# Patient Record
Sex: FEMALE | Race: BLACK OR AFRICAN AMERICAN | Employment: FULL TIME | URBAN - METROPOLITAN AREA
[De-identification: names, ages, dates, MRNs, and addresses within clinical notes are randomized per-mention and may not be internally consistent; named-entity substitution may affect disease eponyms.]

---

## 2017-03-20 ENCOUNTER — OFFICE VISIT (OUTPATIENT)
Dept: INTERNAL MEDICINE CLINIC | Facility: CLINIC | Age: 37
End: 2017-03-20

## 2017-03-20 ENCOUNTER — LAB ENCOUNTER (OUTPATIENT)
Dept: LAB | Age: 37
End: 2017-03-20
Attending: INTERNAL MEDICINE
Payer: COMMERCIAL

## 2017-03-20 VITALS
HEIGHT: 63 IN | HEART RATE: 92 BPM | SYSTOLIC BLOOD PRESSURE: 141 MMHG | BODY MASS INDEX: 40.75 KG/M2 | WEIGHT: 230 LBS | DIASTOLIC BLOOD PRESSURE: 85 MMHG

## 2017-03-20 DIAGNOSIS — Z00.00 ROUTINE GENERAL MEDICAL EXAMINATION AT A HEALTH CARE FACILITY: Primary | ICD-10-CM

## 2017-03-20 DIAGNOSIS — Z00.00 ROUTINE GENERAL MEDICAL EXAMINATION AT A HEALTH CARE FACILITY: ICD-10-CM

## 2017-03-20 DIAGNOSIS — K21.9 GASTROESOPHAGEAL REFLUX DISEASE WITHOUT ESOPHAGITIS: ICD-10-CM

## 2017-03-20 DIAGNOSIS — I10 ESSENTIAL HYPERTENSION: ICD-10-CM

## 2017-03-20 DIAGNOSIS — E66.09 NON MORBID OBESITY DUE TO EXCESS CALORIES: ICD-10-CM

## 2017-03-20 DIAGNOSIS — K64.9 HEMORRHOIDS, UNSPECIFIED HEMORRHOID TYPE: ICD-10-CM

## 2017-03-20 LAB
ALBUMIN SERPL BCP-MCNC: 3.7 G/DL (ref 3.5–4.8)
ALBUMIN/GLOB SERPL: 1.1 {RATIO} (ref 1–2)
ALP SERPL-CCNC: 55 U/L (ref 32–100)
ALT SERPL-CCNC: 19 U/L (ref 14–54)
ANION GAP SERPL CALC-SCNC: 9 MMOL/L (ref 0–18)
AST SERPL-CCNC: 19 U/L (ref 15–41)
BASOPHILS # BLD: 0.1 K/UL (ref 0–0.2)
BASOPHILS NFR BLD: 1 %
BILIRUB SERPL-MCNC: 0.6 MG/DL (ref 0.3–1.2)
BILIRUB UR QL: NEGATIVE
BUN SERPL-MCNC: 11 MG/DL (ref 8–20)
BUN/CREAT SERPL: 12.6 (ref 10–20)
CALCIUM SERPL-MCNC: 9.5 MG/DL (ref 8.5–10.5)
CHLORIDE SERPL-SCNC: 102 MMOL/L (ref 95–110)
CHOLEST SERPL-MCNC: 224 MG/DL (ref 110–200)
CLARITY UR: CLEAR
CO2 SERPL-SCNC: 29 MMOL/L (ref 22–32)
COLOR UR: YELLOW
CREAT SERPL-MCNC: 0.87 MG/DL (ref 0.5–1.5)
EOSINOPHIL # BLD: 0.1 K/UL (ref 0–0.7)
EOSINOPHIL NFR BLD: 1 %
ERYTHROCYTE [DISTWIDTH] IN BLOOD BY AUTOMATED COUNT: 14.4 % (ref 11–15)
GLOBULIN PLAS-MCNC: 3.5 G/DL (ref 2.5–3.7)
GLUCOSE SERPL-MCNC: 90 MG/DL (ref 70–99)
GLUCOSE UR-MCNC: NEGATIVE MG/DL
HCT VFR BLD AUTO: 36.8 % (ref 35–48)
HDLC SERPL-MCNC: 54 MG/DL
HGB BLD-MCNC: 11.7 G/DL (ref 12–16)
HGB UR QL STRIP.AUTO: NEGATIVE
KETONES UR-MCNC: NEGATIVE MG/DL
LDLC SERPL CALC-MCNC: 154 MG/DL (ref 0–99)
LEUKOCYTE ESTERASE UR QL STRIP.AUTO: NEGATIVE
LYMPHOCYTES # BLD: 1.9 K/UL (ref 1–4)
LYMPHOCYTES NFR BLD: 30 %
MCH RBC QN AUTO: 23.1 PG (ref 27–32)
MCHC RBC AUTO-ENTMCNC: 31.6 G/DL (ref 32–37)
MCV RBC AUTO: 73.1 FL (ref 80–100)
MONOCYTES # BLD: 0.5 K/UL (ref 0–1)
MONOCYTES NFR BLD: 7 %
NEUTROPHILS # BLD AUTO: 3.9 K/UL (ref 1.8–7.7)
NEUTROPHILS NFR BLD: 61 %
NITRITE UR QL STRIP.AUTO: NEGATIVE
NONHDLC SERPL-MCNC: 170 MG/DL
OSMOLALITY UR CALC.SUM OF ELEC: 289 MOSM/KG (ref 275–295)
PH UR: 6 [PH] (ref 5–8)
PLATELET # BLD AUTO: 241 K/UL (ref 140–400)
PMV BLD AUTO: 11.7 FL (ref 7.4–10.3)
POTASSIUM SERPL-SCNC: 2.9 MMOL/L (ref 3.3–5.1)
PROT SERPL-MCNC: 7.2 G/DL (ref 5.9–8.4)
PROT UR-MCNC: 30 MG/DL
RBC # BLD AUTO: 5.04 M/UL (ref 3.7–5.4)
RBC #/AREA URNS AUTO: <1 /HPF
SODIUM SERPL-SCNC: 140 MMOL/L (ref 136–144)
SP GR UR STRIP: 1.03 (ref 1–1.03)
TRIGL SERPL-MCNC: 81 MG/DL (ref 1–149)
TSH SERPL-ACNC: 1.75 UIU/ML (ref 0.34–5.6)
UROBILINOGEN UR STRIP-ACNC: 2
VIT C UR-MCNC: 40 MG/DL
WBC # BLD AUTO: 6.4 K/UL (ref 4–11)
WBC #/AREA URNS AUTO: 1 /HPF

## 2017-03-20 PROCEDURE — 80061 LIPID PANEL: CPT

## 2017-03-20 PROCEDURE — 99395 PREV VISIT EST AGE 18-39: CPT | Performed by: INTERNAL MEDICINE

## 2017-03-20 PROCEDURE — 85025 COMPLETE CBC W/AUTO DIFF WBC: CPT

## 2017-03-20 PROCEDURE — 84443 ASSAY THYROID STIM HORMONE: CPT

## 2017-03-20 PROCEDURE — 82306 VITAMIN D 25 HYDROXY: CPT

## 2017-03-20 PROCEDURE — 81001 URINALYSIS AUTO W/SCOPE: CPT

## 2017-03-20 PROCEDURE — 96372 THER/PROPH/DIAG INJ SC/IM: CPT | Performed by: INTERNAL MEDICINE

## 2017-03-20 PROCEDURE — 80053 COMPREHEN METABOLIC PANEL: CPT

## 2017-03-20 PROCEDURE — 36415 COLL VENOUS BLD VENIPUNCTURE: CPT

## 2017-03-20 RX ORDER — HYDROCHLOROTHIAZIDE 25 MG/1
25 TABLET ORAL AS NEEDED
Qty: 90 TABLET | Refills: 3 | Status: SHIPPED | OUTPATIENT
Start: 2017-03-20 | End: 2017-06-29

## 2017-03-20 RX ORDER — MEDROXYPROGESTERONE ACETATE 150 MG/ML
150 INJECTION, SUSPENSION INTRAMUSCULAR ONCE
Status: COMPLETED | OUTPATIENT
Start: 2017-03-20 | End: 2017-03-20

## 2017-03-20 RX ORDER — ONDANSETRON HYDROCHLORIDE 8 MG/1
8 TABLET, FILM COATED ORAL EVERY 8 HOURS PRN
Qty: 10 TABLET | Refills: 0 | Status: SHIPPED | OUTPATIENT
Start: 2017-03-20 | End: 2019-02-07

## 2017-03-20 RX ADMIN — MEDROXYPROGESTERONE ACETATE 150 MG: 150 INJECTION, SUSPENSION INTRAMUSCULAR at 11:45:00

## 2017-03-20 NOTE — PROGRESS NOTES
HPI:    Patient ID: Fidel Vanessa is a 43year old female. HPI this 41-year-old -American female is feeling well.   She is under stress with a very demanding job  Hypertension:Patient has been following low salt diet and has been taking anti-hype atraumatic. Right Ear: External ear normal.   Left Ear: External ear normal.   Nose: Nose normal.   Mouth/Throat: Oropharynx is clear and moist.   Eyes: Conjunctivae and EOM are normal. Pupils are equal, round, and reactive to light.    Neck: Normal range [E]; Future  - Vitamin D, 25-Hydroxy [E]; Future    2. Essential hypertension  Patient has been following low salt diet and has been taking anti-hypertensive prescriptions as prescribed. lood pressure has been checked and is under good control at home with

## 2017-03-22 LAB — 25(OH)D3 SERPL-MCNC: 30 NG/ML

## 2017-03-23 NOTE — PROGRESS NOTES
Quick Note:    Lab: Potassium is low Please ingest high potassium food and consider taking pediatric feosol 1 daily . Lipids remain elevated.  Low-fat die And follow-up with physician  ______

## 2017-04-04 ENCOUNTER — TELEPHONE (OUTPATIENT)
Dept: INTERNAL MEDICINE CLINIC | Facility: CLINIC | Age: 37
End: 2017-04-04

## 2017-04-05 NOTE — TELEPHONE ENCOUNTER
Spoke with patient (identified name and ), results reviewed and agrees with plan.   Diet for chol/high K+ and results mailed

## 2017-04-05 NOTE — TELEPHONE ENCOUNTER
----- Message from Denisse Laws MD sent at 3/23/2017 12:40 PM CDT -----  Lab: Potassium is low Please ingest high potassium food and consider taking pediatric feosol  1 daily  . Lipids remain elevated.   Low-fat die  And follow-up with physician

## 2017-06-01 ENCOUNTER — TELEPHONE (OUTPATIENT)
Dept: INTERNAL MEDICINE CLINIC | Facility: CLINIC | Age: 37
End: 2017-06-01

## 2017-06-01 DIAGNOSIS — I10 ESSENTIAL HYPERTENSION: Primary | ICD-10-CM

## 2017-06-01 NOTE — TELEPHONE ENCOUNTER
Pt is a former pt of Dr. Juan Jose Garrido and is requesting a Depo Provera injection the week of 06/12. Pt last injection dated 03/20/17 and next injection is due 06/05-06/19/19. Pt has an appt with Dr. Jeannie George on 06/19/17.  Is it ok to schedule nurse visit for

## 2017-06-01 NOTE — TELEPHONE ENCOUNTER
Pt calling and is in need of a depo shot. No standing orders in system.  Pt would like to come in during week of June 12th  Former pt of Dr. Radha Brink

## 2017-06-02 NOTE — TELEPHONE ENCOUNTER
Pt was contacted and message was relayed. Appointment was scheduled for Depo injection for 06/12/17 and informed that the order for the blood is in the system and that she will need to be fasting. All instruction was voiced and understood.

## 2017-06-02 NOTE — TELEPHONE ENCOUNTER
Approved - if  Pregnancy  Test  Neg .   Please advice pt  To have labs  Dine  Before  Visit -fasting

## 2017-06-12 ENCOUNTER — NURSE ONLY (OUTPATIENT)
Dept: INTERNAL MEDICINE CLINIC | Facility: CLINIC | Age: 37
End: 2017-06-12

## 2017-06-12 ENCOUNTER — LAB ENCOUNTER (OUTPATIENT)
Dept: LAB | Age: 37
End: 2017-06-12
Attending: INTERNAL MEDICINE
Payer: COMMERCIAL

## 2017-06-12 DIAGNOSIS — Z30.42 ENCOUNTER FOR DEPO-PROVERA CONTRACEPTION: Primary | ICD-10-CM

## 2017-06-12 DIAGNOSIS — I10 ESSENTIAL HYPERTENSION: ICD-10-CM

## 2017-06-12 PROCEDURE — 85025 COMPLETE CBC W/AUTO DIFF WBC: CPT

## 2017-06-12 PROCEDURE — 80053 COMPREHEN METABOLIC PANEL: CPT

## 2017-06-12 PROCEDURE — 81001 URINALYSIS AUTO W/SCOPE: CPT

## 2017-06-12 PROCEDURE — 84443 ASSAY THYROID STIM HORMONE: CPT

## 2017-06-12 PROCEDURE — 80061 LIPID PANEL: CPT

## 2017-06-12 PROCEDURE — 36415 COLL VENOUS BLD VENIPUNCTURE: CPT

## 2017-06-12 PROCEDURE — 96372 THER/PROPH/DIAG INJ SC/IM: CPT | Performed by: INTERNAL MEDICINE

## 2017-06-12 RX ORDER — MEDROXYPROGESTERONE ACETATE 150 MG/ML
150 INJECTION, SUSPENSION INTRAMUSCULAR ONCE
Status: COMPLETED | OUTPATIENT
Start: 2017-06-12 | End: 2017-06-12

## 2017-06-12 RX ADMIN — MEDROXYPROGESTERONE ACETATE 150 MG: 150 INJECTION, SUSPENSION INTRAMUSCULAR at 09:40:00

## 2017-06-12 NOTE — PROGRESS NOTES
Patient here for Depo-Provera. Name, , medication and order verified. Depo- Provera 150mg given right upper gluteus. Next Depo- Provera due date given to patient - .

## 2017-06-13 ENCOUNTER — TELEPHONE (OUTPATIENT)
Dept: INTERNAL MEDICINE CLINIC | Facility: CLINIC | Age: 37
End: 2017-06-13

## 2017-06-13 DIAGNOSIS — R51.9 NONINTRACTABLE HEADACHE, UNSPECIFIED CHRONICITY PATTERN, UNSPECIFIED HEADACHE TYPE: ICD-10-CM

## 2017-06-13 DIAGNOSIS — G47.33 OBSTRUCTIVE SLEEP APNEA SYNDROME: Primary | ICD-10-CM

## 2017-06-13 DIAGNOSIS — R53.83 OTHER FATIGUE: ICD-10-CM

## 2017-06-13 RX ORDER — POTASSIUM CHLORIDE 750 MG/1
10 TABLET, FILM COATED, EXTENDED RELEASE ORAL DAILY
Qty: 7 TABLET | Refills: 0 | Status: SHIPPED | OUTPATIENT
Start: 2017-06-13 | End: 2017-06-29

## 2017-06-14 ENCOUNTER — TELEPHONE (OUTPATIENT)
Dept: INTERNAL MEDICINE CLINIC | Facility: CLINIC | Age: 37
End: 2017-06-14

## 2017-06-14 DIAGNOSIS — E87.6 LOW BLOOD POTASSIUM: Primary | ICD-10-CM

## 2017-06-14 NOTE — TELEPHONE ENCOUNTER
----- Message from Angeles Diehl MD sent at 6/13/2017 11:33 PM CDT -----  Please  Call pt  -Advice pt to take  potassium  Tab -until seen in office     Sent to  Pharmacy   Recheck K level in  1 week     F/u in 1 week - 6/19 apt -

## 2017-06-15 ENCOUNTER — TELEPHONE (OUTPATIENT)
Dept: INTERNAL MEDICINE CLINIC | Facility: CLINIC | Age: 37
End: 2017-06-15

## 2017-06-15 NOTE — TELEPHONE ENCOUNTER
Pharmacy calling regarding Potassium Chloride ER 10 MEQ Oral Tab CR. Pharmacy state that pt has a problem swallowing and is looking for dissolvable solution. ..   Please advise

## 2017-06-15 NOTE — TELEPHONE ENCOUNTER
Please note. Thank you. Pt was contacted (Name and  verified) and MD result message relayed to pt. Pt has OV appt 17 and states that she won't be able to  the Potassium until tomorrow since the pharmacy is near her workplace.     Pt was

## 2017-06-15 NOTE — TELEPHONE ENCOUNTER
Per verbal order of Dr. Edgar Fees, it was stated that call [employer] to see what alternative potassium medicine that could be given for the pt.  [facility] pharmacy was contacted and it was state that they gave the pt some dissolvable potassium medi

## 2017-06-15 NOTE — TELEPHONE ENCOUNTER
Noted  Pharmacy  Contacted  -  dissolvable tab   Provided  To patient  .   To eat  Bananas ,  Greens  - kale , broccoli ,   Dry  Fruit  And  Potato ,   tomato's   food  Rich  with potasium until  Takes  tablets

## 2017-06-17 NOTE — TELEPHONE ENCOUNTER
Pt informed of Dr Cindy Taylor 6/15/17 orders below and she verbalized understanding and has her medication.

## 2017-06-23 ENCOUNTER — APPOINTMENT (OUTPATIENT)
Dept: LAB | Age: 37
End: 2017-06-23
Attending: INTERNAL MEDICINE
Payer: COMMERCIAL

## 2017-06-23 DIAGNOSIS — E87.6 LOW BLOOD POTASSIUM: ICD-10-CM

## 2017-06-23 PROCEDURE — 36415 COLL VENOUS BLD VENIPUNCTURE: CPT

## 2017-06-23 PROCEDURE — 84132 ASSAY OF SERUM POTASSIUM: CPT

## 2017-06-29 ENCOUNTER — OFFICE VISIT (OUTPATIENT)
Dept: INTERNAL MEDICINE CLINIC | Facility: CLINIC | Age: 37
End: 2017-06-29

## 2017-06-29 VITALS
SYSTOLIC BLOOD PRESSURE: 131 MMHG | BODY MASS INDEX: 41.64 KG/M2 | WEIGHT: 235 LBS | TEMPERATURE: 98 F | HEART RATE: 103 BPM | DIASTOLIC BLOOD PRESSURE: 85 MMHG | RESPIRATION RATE: 20 BRPM | HEIGHT: 63 IN

## 2017-06-29 DIAGNOSIS — D64.9 ANEMIA, UNSPECIFIED TYPE: Primary | ICD-10-CM

## 2017-06-29 DIAGNOSIS — I10 ESSENTIAL HYPERTENSION: ICD-10-CM

## 2017-06-29 DIAGNOSIS — R53.83 OTHER FATIGUE: ICD-10-CM

## 2017-06-29 DIAGNOSIS — K21.9 GASTROESOPHAGEAL REFLUX DISEASE WITHOUT ESOPHAGITIS: ICD-10-CM

## 2017-06-29 PROCEDURE — 99214 OFFICE O/P EST MOD 30 MIN: CPT | Performed by: INTERNAL MEDICINE

## 2017-06-29 PROCEDURE — 99212 OFFICE O/P EST SF 10 MIN: CPT | Performed by: INTERNAL MEDICINE

## 2017-06-29 RX ORDER — SPIRONOLACTONE 25 MG/1
25 TABLET ORAL DAILY
Qty: 90 TABLET | Refills: 0 | Status: SHIPPED | OUTPATIENT
Start: 2017-06-29 | End: 2017-11-03

## 2017-06-29 RX ORDER — OMEPRAZOLE 40 MG/1
40 CAPSULE, DELAYED RELEASE ORAL 2 TIMES DAILY
Qty: 60 CAPSULE | Refills: 0 | Status: SHIPPED | OUTPATIENT
Start: 2017-06-29 | End: 2017-07-29

## 2017-06-29 NOTE — PROGRESS NOTES
HPI:    Patient ID: Corwin Snow is a 43year old female. Hypertension   This is a chronic problem. The current episode started more than 1 year ago. The problem has been gradually improving since onset.  Associated symptoms include malaise/fatigue (fe by mouth daily. Disp:  Rfl:    loratadine (CLARITIN) 10 MG Oral Tab Take 10 mg by mouth daily.  Disp:  Rfl:      Allergies:  Ciprofloxacin           Nausea and vomiting  Sulfasalazine           Pain   PHYSICAL EXAM:   Physical Exam   Constitutional: She is perscribed   Low- sodium diet (2grams per day)   Maintain a low fat diet   Maintain ideal weight   Regular walking/exercise as tolerated   Track and record blood pressure at home   The risks and benefits of treatment plan with discussed with patient   Shweta Saldana

## 2017-07-06 ENCOUNTER — NURSE ONLY (OUTPATIENT)
Dept: INTERNAL MEDICINE CLINIC | Facility: CLINIC | Age: 37
End: 2017-07-06

## 2017-07-06 VITALS — SYSTOLIC BLOOD PRESSURE: 112 MMHG | DIASTOLIC BLOOD PRESSURE: 77 MMHG | HEART RATE: 78 BPM

## 2017-07-06 DIAGNOSIS — I10 ESSENTIAL HYPERTENSION: Primary | ICD-10-CM

## 2017-07-06 NOTE — PROGRESS NOTES
Pt presented today for blood pressure check. Pt was allowed to sit for 10 minutes before reading to relax. /77 with HR of 78. PCP was made aware and no new changed to meds was done.

## 2017-07-11 ENCOUNTER — TELEPHONE (OUTPATIENT)
Dept: INTERNAL MEDICINE CLINIC | Facility: CLINIC | Age: 37
End: 2017-07-11

## 2017-07-11 NOTE — TELEPHONE ENCOUNTER
Natalio Mccarthy from [facility] insurance wanted to discuss the sleep study and if pt can do it at home.  Please advise

## 2017-07-13 ENCOUNTER — TELEPHONE (OUTPATIENT)
Dept: INTERNAL MEDICINE CLINIC | Facility: CLINIC | Age: 37
End: 2017-07-13

## 2017-07-13 NOTE — TELEPHONE ENCOUNTER
Received  Call from [id number] Vamsi Sheldon @  [facility]. Patient does not meet criteria for sleep study. You can call for peer to peer  [phone] or order a home sleep study. Please advise.   Thank Roberto Montalvo

## 2017-07-13 NOTE — TELEPHONE ENCOUNTER
ALPA Harris with Soraida Avelar at [facility] 90 day window for the home sleep study Reference #[id number]. Code [id number]. Notified patient to call Sleep Study Lab and provided number.

## 2017-08-01 ENCOUNTER — OFFICE VISIT (OUTPATIENT)
Dept: SLEEP CENTER | Age: 37
End: 2017-08-01
Attending: INTERNAL MEDICINE
Payer: COMMERCIAL

## 2017-08-01 ENCOUNTER — LAB ENCOUNTER (OUTPATIENT)
Dept: LAB | Age: 37
End: 2017-08-01
Attending: INTERNAL MEDICINE
Payer: COMMERCIAL

## 2017-08-01 DIAGNOSIS — G47.33 OSA (OBSTRUCTIVE SLEEP APNEA): Primary | ICD-10-CM

## 2017-08-01 DIAGNOSIS — D64.9 ANEMIA, UNSPECIFIED: Primary | ICD-10-CM

## 2017-08-01 DIAGNOSIS — K21.9 GASTROESOPHAGEAL REFLUX DISEASE WITHOUT ESOPHAGITIS: ICD-10-CM

## 2017-08-01 DIAGNOSIS — I10 ESSENTIAL HYPERTENSION: ICD-10-CM

## 2017-08-01 DIAGNOSIS — R53.83 OTHER FATIGUE: ICD-10-CM

## 2017-08-01 DIAGNOSIS — D64.9 ANEMIA, UNSPECIFIED TYPE: ICD-10-CM

## 2017-08-01 LAB
ALBUMIN SERPL BCP-MCNC: 3.6 G/DL (ref 3.5–4.8)
ALBUMIN/GLOB SERPL: 1.2 {RATIO} (ref 1–2)
ALP SERPL-CCNC: 54 U/L (ref 32–100)
ALT SERPL-CCNC: 19 U/L (ref 14–54)
ANION GAP SERPL CALC-SCNC: 8 MMOL/L (ref 0–18)
AST SERPL-CCNC: 18 U/L (ref 15–41)
BASOPHILS # BLD: 0 K/UL (ref 0–0.2)
BASOPHILS NFR BLD: 1 %
BILIRUB SERPL-MCNC: 0.5 MG/DL (ref 0.3–1.2)
BUN SERPL-MCNC: 7 MG/DL (ref 8–20)
BUN/CREAT SERPL: 7.7 (ref 10–20)
CALCIUM SERPL-MCNC: 9.1 MG/DL (ref 8.5–10.5)
CHLORIDE SERPL-SCNC: 103 MMOL/L (ref 95–110)
CO2 SERPL-SCNC: 25 MMOL/L (ref 22–32)
CREAT SERPL-MCNC: 0.91 MG/DL (ref 0.5–1.5)
EOSINOPHIL # BLD: 0.1 K/UL (ref 0–0.7)
EOSINOPHIL NFR BLD: 2 %
ERYTHROCYTE [DISTWIDTH] IN BLOOD BY AUTOMATED COUNT: 14.6 % (ref 11–15)
FERRITIN SERPL IA-MCNC: 28 NG/ML (ref 11–307)
FOLATE SERPL-MCNC: >23.3 NG/ML
GLOBULIN PLAS-MCNC: 3.1 G/DL (ref 2.5–3.7)
GLUCOSE SERPL-MCNC: 93 MG/DL (ref 70–99)
HCT VFR BLD AUTO: 35.7 % (ref 35–48)
HGB BLD-MCNC: 11.4 G/DL (ref 12–16)
HGB S BLD QL SOLY: NEGATIVE
IRON SATN MFR SERPL: 22 % (ref 15–50)
IRON SERPL-MCNC: 72 MCG/DL (ref 28–170)
LYMPHOCYTES # BLD: 1.5 K/UL (ref 1–4)
LYMPHOCYTES NFR BLD: 29 %
MCH RBC QN AUTO: 23.2 PG (ref 27–32)
MCHC RBC AUTO-ENTMCNC: 31.8 G/DL (ref 32–37)
MCV RBC AUTO: 72.9 FL (ref 80–100)
MONOCYTES # BLD: 0.4 K/UL (ref 0–1)
MONOCYTES NFR BLD: 8 %
NEUTROPHILS # BLD AUTO: 3.2 K/UL (ref 1.8–7.7)
NEUTROPHILS NFR BLD: 61 %
OSMOLALITY UR CALC.SUM OF ELEC: 280 MOSM/KG (ref 275–295)
PLATELET # BLD AUTO: 250 K/UL (ref 140–400)
PMV BLD AUTO: 11.2 FL (ref 7.4–10.3)
POTASSIUM SERPL-SCNC: 3.8 MMOL/L (ref 3.3–5.1)
PROT SERPL-MCNC: 6.7 G/DL (ref 5.9–8.4)
RBC # BLD AUTO: 4.9 M/UL (ref 3.7–5.4)
SODIUM SERPL-SCNC: 136 MMOL/L (ref 136–144)
TIBC SERPL-MCNC: 329 MCG/DL (ref 228–428)
TRANSFERRIN SERPL-MCNC: 249 MG/DL (ref 192–382)
VIT B12 SERPL-MCNC: 522 PG/ML (ref 181–914)
WBC # BLD AUTO: 5.2 K/UL (ref 4–11)

## 2017-08-01 PROCEDURE — 84466 ASSAY OF TRANSFERRIN: CPT

## 2017-08-01 PROCEDURE — 82746 ASSAY OF FOLIC ACID SERUM: CPT

## 2017-08-01 PROCEDURE — 80053 COMPREHEN METABOLIC PANEL: CPT

## 2017-08-01 PROCEDURE — 85025 COMPLETE CBC W/AUTO DIFF WBC: CPT

## 2017-08-01 PROCEDURE — 85660 RBC SICKLE CELL TEST: CPT

## 2017-08-01 PROCEDURE — 83020 HEMOGLOBIN ELECTROPHORESIS: CPT

## 2017-08-01 PROCEDURE — 83540 ASSAY OF IRON: CPT

## 2017-08-01 PROCEDURE — 36415 COLL VENOUS BLD VENIPUNCTURE: CPT

## 2017-08-01 PROCEDURE — 82728 ASSAY OF FERRITIN: CPT

## 2017-08-01 PROCEDURE — 95806 SLEEP STUDY UNATT&RESP EFFT: CPT

## 2017-08-01 PROCEDURE — 82607 VITAMIN B-12: CPT

## 2017-08-01 PROCEDURE — 83021 HEMOGLOBIN CHROMOTOGRAPHY: CPT

## 2017-08-01 PROCEDURE — 82306 VITAMIN D 25 HYDROXY: CPT

## 2017-08-02 ENCOUNTER — TELEPHONE (OUTPATIENT)
Dept: INTERNAL MEDICINE CLINIC | Facility: CLINIC | Age: 37
End: 2017-08-02

## 2017-08-02 LAB
25(OH)D3 SERPL-MCNC: 33.8 NG/ML
HGB A2 MFR BLD: 3 % (ref 1.5–3.5)
HGB F MFR BLD: 0 % (ref 0–2)
HGB PNL BLD ELPH: 95.6 % (ref 95.5–100)
HGB S MFR BLD: 0 %

## 2017-08-02 RX ORDER — MELATONIN
325 2 TIMES DAILY
Qty: 60 TABLET | Refills: 3 | Status: SHIPPED | OUTPATIENT
Start: 2017-08-02 | End: 2017-09-07

## 2017-08-02 NOTE — TELEPHONE ENCOUNTER
Since  Anemia is not  Bad Can Try  Better  Diet  With  A lot of  Greens and  Spinach and  Lean meet .   Still  Can try  Slow iron  Tab  otc - smaller   Dosage might be  Better for her stomach       F/u in  1 month

## 2017-08-02 NOTE — TELEPHONE ENCOUNTER
Verified name and . Results/recommendations reviewed with pt and pt verb understanding. Pt states she can't take iron as it causes her stomach pain. Pt wanted MD to know it does not cause nausea, but pain in her stomach.   Pt asking if there is some

## 2017-08-02 NOTE — PROGRESS NOTES
Please call patient with blood test results.     Labs normal except  Anemia mild due to  Low iron      advice pt  To take  ferros sulfate  325 mg twice daily - sent to pharmacy    f/u in office  Within 1 month    Follow up within 1 month or sooner if there

## 2017-08-02 NOTE — TELEPHONE ENCOUNTER
----- Message from Lamonte Garcia MD sent at 8/2/2017  7:33 AM CDT -----  Please call patient with blood test results.     Labs normal except  Anemia mild due to  Low iron      advice pt  To take  ferros sulfate  325 mg twice daily - sent to pharmacy

## 2017-08-03 NOTE — TELEPHONE ENCOUNTER
Pt advised of EV message below. She verbalized understanding. She will try the slow iron tablets and to modify her diet to improve anemia.

## 2017-08-04 ENCOUNTER — TELEPHONE (OUTPATIENT)
Dept: INTERNAL MEDICINE CLINIC | Facility: CLINIC | Age: 37
End: 2017-08-04

## 2017-08-04 NOTE — TELEPHONE ENCOUNTER
Pt was contacted and had questions regarding her sleep study results. Pt state that she is still tired all throughout the day. Pt was informed that PCP do want her to return within one month to discuss her symptoms further and appt was scheduled.

## 2017-08-04 NOTE — TELEPHONE ENCOUNTER
Pt would like to speak with [facility]. Per pt she has a few questions regarding the sleep study. Please, call pt at [phone].

## 2017-08-04 NOTE — PROCEDURES
[address] [facility]  Accredited by the [facility] of Sleep Medicine ([facility])    PATIENT'S NAME: Evelyn White   ATTENDING PHYSICIAN: Shamika Champagne MD   REFERRING PHYSICIAN: Shamika Champagne MD   PATIENT ACCOUNT #: [id number] LOCATION: [facility] drug.  4.   Patient should not drive if at all sleepy. 5.   Clinical correlation is required regarding the complaint of unrefreshing sleep. Consider screening for hypothyroidism if not already performed.   Can consider extending sleep time by 1-2 hours to

## 2017-08-04 NOTE — TELEPHONE ENCOUNTER
Message was left on voicemail regarding sleep study results and copy of result was mailed to pt's home.

## 2017-08-04 NOTE — TELEPHONE ENCOUNTER
Please review home sleep studies, regular sleep test was not approved by patient's insurance when you  were away, she did home sleep study to start, let him know results, will insurance if you still feel she needs a regular sleep test to approve

## 2017-08-04 NOTE — TELEPHONE ENCOUNTER
PLEASE  CALL PATIENT WITH  SLEEP  TEST  RESULTS       shows NO  evidence of significant sleep-disordered breathing. Snoring was appreciated throughout     ADVICE PATIENT TO   1. Weight loss. 2.                 Avoid alcohol.   3.

## 2017-08-14 NOTE — TELEPHONE ENCOUNTER
Requesting Omeprazole refill    Med listed as  in med history; med pended per instructions as noted in EMR, please advise    Refill Protocol Appointment Criteria  · Appointment scheduled in the past 12 months or in the next 3 months  Recent Outpatie

## 2017-08-16 RX ORDER — OMEPRAZOLE 40 MG/1
40 CAPSULE, DELAYED RELEASE ORAL 2 TIMES DAILY
Qty: 60 CAPSULE | Refills: 0 | Status: SHIPPED | OUTPATIENT
Start: 2017-08-16 | End: 2017-09-25

## 2017-09-07 ENCOUNTER — OFFICE VISIT (OUTPATIENT)
Dept: INTERNAL MEDICINE CLINIC | Facility: CLINIC | Age: 37
End: 2017-09-07

## 2017-09-07 VITALS
WEIGHT: 241 LBS | SYSTOLIC BLOOD PRESSURE: 137 MMHG | RESPIRATION RATE: 20 BRPM | HEART RATE: 96 BPM | BODY MASS INDEX: 42.7 KG/M2 | TEMPERATURE: 98 F | HEIGHT: 63 IN | DIASTOLIC BLOOD PRESSURE: 89 MMHG

## 2017-09-07 DIAGNOSIS — Z30.42 SURVEILLANCE FOR DEPO-PROVERA CONTRACEPTION: ICD-10-CM

## 2017-09-07 DIAGNOSIS — I10 ESSENTIAL HYPERTENSION: Primary | ICD-10-CM

## 2017-09-07 DIAGNOSIS — R53.83 OTHER FATIGUE: ICD-10-CM

## 2017-09-07 DIAGNOSIS — E66.9 OBESITY (BMI 30-39.9): ICD-10-CM

## 2017-09-07 LAB
CONTROL LINE PRESENT WITH A CLEAR BACKGROUND (YES/NO): YES YES/NO
KIT LOT #: NORMAL NUMERIC
PREGNANCY TEST, URINE: NEGATIVE

## 2017-09-07 PROCEDURE — 96372 THER/PROPH/DIAG INJ SC/IM: CPT | Performed by: INTERNAL MEDICINE

## 2017-09-07 PROCEDURE — 81025 URINE PREGNANCY TEST: CPT | Performed by: INTERNAL MEDICINE

## 2017-09-07 PROCEDURE — 99213 OFFICE O/P EST LOW 20 MIN: CPT | Performed by: INTERNAL MEDICINE

## 2017-09-07 PROCEDURE — 99214 OFFICE O/P EST MOD 30 MIN: CPT | Performed by: INTERNAL MEDICINE

## 2017-09-07 RX ORDER — MEDROXYPROGESTERONE ACETATE 150 MG/ML
150 INJECTION, SUSPENSION INTRAMUSCULAR
Status: SHIPPED | OUTPATIENT
Start: 2017-09-07

## 2017-09-07 RX ADMIN — MEDROXYPROGESTERONE ACETATE 150 MG: 150 INJECTION, SUSPENSION INTRAMUSCULAR at 18:16:00

## 2017-09-07 NOTE — PROGRESS NOTES
Pregnancy test was performed and it was negative. Pt tolerated injection well. Next injection dates (11/23-12/07/17) was given to the pt with understanding voiced.

## 2017-09-07 NOTE — PROGRESS NOTES
HPI:    Patient ID: Ej Aviles is a 42year old female. Hypertension   This is a chronic problem. The current episode started more than 1 year ago. The problem has been gradually improving since onset.  Associated symptoms include malaise/fatigue (pe (ZOFRAN) 8 MG tablet Take 1 tablet (8 mg total) by mouth every 8 (eight) hours as needed for Nausea. Disp: 10 tablet Rfl: 0   loratadine (CLARITIN) 10 MG Oral Tab Take 10 mg by mouth daily.  Disp:  Rfl:      Allergies:  Ciprofloxacin           Nausea and vo Essential hypertension  (primary encounter diagnosis)  Take high blood pressure medication as perscribed   Low- sodium diet (2grams per day)   Maintain a low fat diet   Maintain ideal weight   Regular walking/exercise as tolerated   Track and record bloo

## 2017-09-13 ENCOUNTER — OFFICE VISIT (OUTPATIENT)
Dept: INTERNAL MEDICINE CLINIC | Facility: CLINIC | Age: 37
End: 2017-09-13

## 2017-09-13 VITALS
DIASTOLIC BLOOD PRESSURE: 84 MMHG | TEMPERATURE: 99 F | BODY MASS INDEX: 42.35 KG/M2 | SYSTOLIC BLOOD PRESSURE: 140 MMHG | RESPIRATION RATE: 20 BRPM | HEIGHT: 63 IN | WEIGHT: 239 LBS | HEART RATE: 81 BPM

## 2017-09-13 DIAGNOSIS — S06.0X0S CONCUSSION WITHOUT LOSS OF CONSCIOUSNESS, SEQUELA (HCC): Primary | ICD-10-CM

## 2017-09-13 DIAGNOSIS — S13.9XXD NECK SPRAIN, SUBSEQUENT ENCOUNTER: ICD-10-CM

## 2017-09-13 PROBLEM — S13.9XXA NECK SPRAIN: Status: ACTIVE | Noted: 2017-09-13

## 2017-09-13 PROBLEM — S06.0X0A CONCUSSION WITH NO LOSS OF CONSCIOUSNESS: Status: ACTIVE | Noted: 2017-09-13

## 2017-09-13 PROCEDURE — 99212 OFFICE O/P EST SF 10 MIN: CPT | Performed by: INTERNAL MEDICINE

## 2017-09-13 PROCEDURE — 99214 OFFICE O/P EST MOD 30 MIN: CPT | Performed by: INTERNAL MEDICINE

## 2017-09-13 RX ORDER — CYCLOBENZAPRINE HCL 10 MG
10 TABLET ORAL 3 TIMES DAILY PRN
COMMUNITY
End: 2017-09-25
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
## 2017-09-25 ENCOUNTER — OFFICE VISIT (OUTPATIENT)
Dept: INTERNAL MEDICINE CLINIC | Facility: CLINIC | Age: 37
End: 2017-09-25

## 2017-09-25 VITALS
HEART RATE: 91 BPM | WEIGHT: 242 LBS | DIASTOLIC BLOOD PRESSURE: 87 MMHG | HEIGHT: 63 IN | TEMPERATURE: 99 F | RESPIRATION RATE: 20 BRPM | SYSTOLIC BLOOD PRESSURE: 131 MMHG | BODY MASS INDEX: 42.88 KG/M2

## 2017-09-25 DIAGNOSIS — S13.9XXD NECK SPRAIN, SUBSEQUENT ENCOUNTER: Primary | ICD-10-CM
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
## 2017-09-25 DIAGNOSIS — R51.9 NONINTRACTABLE HEADACHE, UNSPECIFIED CHRONICITY PATTERN, UNSPECIFIED HEADACHE TYPE: ICD-10-CM

## 2017-09-25 DIAGNOSIS — R53.83 FATIGUE, UNSPECIFIED TYPE: ICD-10-CM

## 2017-09-25 DIAGNOSIS — I10 ESSENTIAL HYPERTENSION: ICD-10-CM

## 2017-09-25 PROCEDURE — 99214 OFFICE O/P EST MOD 30 MIN: CPT | Performed by: INTERNAL MEDICINE

## 2017-09-25 PROCEDURE — 99212 OFFICE O/P EST SF 10 MIN: CPT | Performed by: INTERNAL MEDICINE

## 2017-09-25 RX ORDER — OMEPRAZOLE 40 MG/1
CAPSULE, DELAYED RELEASE ORAL
Qty: 60 CAPSULE | Refills: 0 | Status: SHIPPED | OUTPATIENT
Start: 2017-09-25 | End: 2017-12-04
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
## 2017-10-05 ENCOUNTER — HOSPITAL ENCOUNTER (OUTPATIENT)
Dept: CT IMAGING | Facility: HOSPITAL | Age: 37
Discharge: HOME | End: 2017-10-05
Attending: INTERNAL MEDICINE
Payer: COMMERCIAL

## 2017-10-05 ENCOUNTER — HOSPITAL ENCOUNTER (OUTPATIENT)
Dept: GENERAL RADIOLOGY | Facility: HOSPITAL | Age: 37
Discharge: HOME | End: 2017-10-05
Attending: INTERNAL MEDICINE
Payer: COMMERCIAL
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
## 2017-10-05 DIAGNOSIS — R51.9 NONINTRACTABLE HEADACHE, UNSPECIFIED CHRONICITY PATTERN, UNSPECIFIED HEADACHE TYPE: ICD-10-CM

## 2017-10-05 DIAGNOSIS — S13.9XXD NECK SPRAIN, SUBSEQUENT ENCOUNTER: ICD-10-CM

## 2017-10-05 PROCEDURE — 70450 CT HEAD/BRAIN W/O DYE: CPT | Performed by: INTERNAL MEDICINE

## 2017-10-05 PROCEDURE — 72040 X-RAY EXAM NECK SPINE 2-3 VW: CPT | Performed by: INTERNAL MEDICINE

## 2017-10-08 RX ORDER — AMOXICILLIN AND CLAVULANATE POTASSIUM 875; 125 MG/1; MG/1
1 TABLET, FILM COATED ORAL 2 TIMES DAILY
Qty: 20 TABLET | Refills: 0 | Status: SHIPPED | OUTPATIENT
Start: 2017-10-08 | End: 2017-10-18

## 2017-10-09 RX ORDER — PREDNISONE 20 MG/1
TABLET ORAL
Qty: 6 TABLET | Refills: 0 | Status: SHIPPED | OUTPATIENT
Start: 2017-10-09 | End: 2017-10-16

## 2017-10-16 ENCOUNTER — OFFICE VISIT (OUTPATIENT)
Dept: INTERNAL MEDICINE CLINIC | Facility: CLINIC | Age: 37
End: 2017-10-16

## 2017-10-16 VITALS
HEART RATE: 97 BPM | BODY MASS INDEX: 42.52 KG/M2 | RESPIRATION RATE: 20 BRPM | SYSTOLIC BLOOD PRESSURE: 134 MMHG | WEIGHT: 240 LBS | DIASTOLIC BLOOD PRESSURE: 85 MMHG | TEMPERATURE: 98 F | HEIGHT: 63 IN

## 2017-10-16 DIAGNOSIS — I10 ESSENTIAL HYPERTENSION: ICD-10-CM

## 2017-10-16 DIAGNOSIS — S13.9XXD NECK SPRAIN, SUBSEQUENT ENCOUNTER: Primary | ICD-10-CM

## 2017-10-16 DIAGNOSIS — J32.9 SINUSITIS, UNSPECIFIED CHRONICITY, UNSPECIFIED LOCATION: ICD-10-CM

## 2017-10-16 DIAGNOSIS — Z12.31 SCREENING MAMMOGRAM, ENCOUNTER FOR: ICD-10-CM

## 2017-10-16 PROCEDURE — 99214 OFFICE O/P EST MOD 30 MIN: CPT | Performed by: INTERNAL MEDICINE

## 2017-10-16 PROCEDURE — 99212 OFFICE O/P EST SF 10 MIN: CPT | Performed by: INTERNAL MEDICINE
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
## 2017-10-23 ENCOUNTER — TELEPHONE (OUTPATIENT)
Dept: NEUROLOGY | Facility: CLINIC | Age: 37
End: 2017-10-23

## 2017-10-24 ENCOUNTER — OFFICE VISIT (OUTPATIENT)
Dept: NEUROLOGY | Facility: CLINIC | Age: 37
End: 2017-10-24

## 2017-10-24 VITALS
RESPIRATION RATE: 16 BRPM | SYSTOLIC BLOOD PRESSURE: 120 MMHG | HEIGHT: 64 IN | BODY MASS INDEX: 40.97 KG/M2 | DIASTOLIC BLOOD PRESSURE: 72 MMHG | HEART RATE: 60 BPM | WEIGHT: 240 LBS

## 2017-10-24 DIAGNOSIS — S13.9XXD NECK SPRAIN, SUBSEQUENT ENCOUNTER: Primary | ICD-10-CM

## 2017-10-24 PROCEDURE — 99204 OFFICE O/P NEW MOD 45 MIN: CPT | Performed by: PHYSICAL MEDICINE & REHABILITATION
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
## 2017-10-24 NOTE — PATIENT INSTRUCTIONS
1) May use OTC topical anesthetics such as BioFreeze, Aspercreme, Icy/Hot    2) Diagnosis: right sided neck muscle strain    3) Physical therapy to get your range of motion back and improve the right sided neck pain    4) If you continue to have pain at/ne

## 2017-10-25 ENCOUNTER — OFFICE VISIT (OUTPATIENT)
Dept: NEUROLOGY | Facility: CLINIC | Age: 37
End: 2017-10-25

## 2017-10-25 VITALS
RESPIRATION RATE: 16 BRPM | DIASTOLIC BLOOD PRESSURE: 92 MMHG | WEIGHT: 240 LBS | HEIGHT: 64 IN | HEART RATE: 80 BPM | SYSTOLIC BLOOD PRESSURE: 144 MMHG | BODY MASS INDEX: 40.97 KG/M2

## 2017-10-25 DIAGNOSIS — R93.0 ABNORMAL CT OF THE HEAD: ICD-10-CM

## 2017-10-25 DIAGNOSIS — S06.0X0A CONCUSSION WITHOUT LOSS OF CONSCIOUSNESS, INITIAL ENCOUNTER: Primary | ICD-10-CM

## 2017-10-25 PROCEDURE — 99243 OFF/OP CNSLTJ NEW/EST LOW 30: CPT | Performed by: OTHER

## 2017-10-25 NOTE — PROGRESS NOTES
Neurology Outpatient Consult Note    Jose Nicole : 1980   Referring Physician: Dr. Krystle Ely  HPI:     Jose Nicole is a [address] old female who is being seen in neurologic evaluation.     Patient is being seen in evaluation for abnormal total) by mouth every 8 (eight) hours as needed for Nausea. Disp: 10 tablet Rfl: 0   loratadine (CLARITIN) 10 MG Oral Tab Take 10 mg by mouth daily.  Disp:  Rfl:       Past Medical History:   Diagnosis Date   • GERD (gastroesophageal reflux disease)    • Hi Resp 16   Ht 64\"   Wt 240 lb   BMI 41.20 kg/m²    General: no apparent distress, pleasant and cooperative   HEENT: No disc edema on funduscopic exam  Neck: Diminished range of motion in the neck  Neuro:  Mental Status: alert, speech fluent and appropria and recommendations with patient at length in lay terms and addressed her questions and concerns    –Referral to optometry for routine eye exam, and to include dilated eye exam to rule out papilledema    –We did discuss further treatment of headaches, but

## 2017-11-01 ENCOUNTER — APPOINTMENT (OUTPATIENT)
Dept: PHYSICAL THERAPY | Facility: HOSPITAL | Age: 37
End: 2017-11-01
Attending: PHYSICAL MEDICINE & REHABILITATION
Payer: COMMERCIAL

## 2017-11-03 DIAGNOSIS — I10 ESSENTIAL HYPERTENSION: ICD-10-CM

## 2017-11-03 NOTE — TELEPHONE ENCOUNTER
Pamela Pettit is calling to put in a request for the medication listed below. Please advise as patient is out. spironolactone 25 MG Oral Tab Take 1 tablet (25 mg total) by mouth daily.  Disp: 90 tablet Rfl: 0

## 2017-11-04 RX ORDER — SPIRONOLACTONE 25 MG/1
25 TABLET ORAL DAILY
Qty: 90 TABLET | Refills: 0 | Status: SHIPPED | OUTPATIENT
Start: 2017-11-04 | End: 2017-12-04

## 2017-11-06 ENCOUNTER — OFFICE VISIT (OUTPATIENT)
Dept: PHYSICAL THERAPY | Facility: HOSPITAL | Age: 37
End: 2017-11-06
Attending: PHYSICAL MEDICINE & REHABILITATION
Payer: COMMERCIAL

## 2017-11-06 DIAGNOSIS — S13.9XXD NECK SPRAIN, SUBSEQUENT ENCOUNTER: ICD-10-CM

## 2017-11-06 PROCEDURE — 97162 PT EVAL MOD COMPLEX 30 MIN: CPT

## 2017-11-06 PROCEDURE — 97110 THERAPEUTIC EXERCISES: CPT
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
## 2017-11-08 ENCOUNTER — OFFICE VISIT (OUTPATIENT)
Dept: PHYSICAL THERAPY | Facility: HOSPITAL | Age: 37
End: 2017-11-08
Attending: PHYSICAL MEDICINE & REHABILITATION
Payer: COMMERCIAL

## 2017-11-08 DIAGNOSIS — S13.9XXD NECK SPRAIN, SUBSEQUENT ENCOUNTER: ICD-10-CM

## 2017-11-08 PROCEDURE — 97110 THERAPEUTIC EXERCISES: CPT

## 2017-11-13 ENCOUNTER — OFFICE VISIT (OUTPATIENT)
Dept: PHYSICAL THERAPY | Facility: HOSPITAL | Age: 37
End: 2017-11-13
Attending: PHYSICAL MEDICINE & REHABILITATION
Payer: COMMERCIAL

## 2017-11-13 DIAGNOSIS — S13.9XXD NECK SPRAIN, SUBSEQUENT ENCOUNTER: ICD-10-CM

## 2017-11-13 PROCEDURE — 97014 ELECTRIC STIMULATION THERAPY: CPT

## 2017-11-13 PROCEDURE — 97110 THERAPEUTIC EXERCISES: CPT
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
## 2017-11-15 ENCOUNTER — OFFICE VISIT (OUTPATIENT)
Dept: PHYSICAL THERAPY | Facility: HOSPITAL | Age: 37
End: 2017-11-15
Attending: PHYSICAL MEDICINE & REHABILITATION
Payer: COMMERCIAL

## 2017-11-15 DIAGNOSIS — S13.9XXD NECK SPRAIN, SUBSEQUENT ENCOUNTER: ICD-10-CM

## 2017-11-15 PROCEDURE — 97110 THERAPEUTIC EXERCISES: CPT
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
## 2017-11-20 ENCOUNTER — OFFICE VISIT (OUTPATIENT)
Dept: PHYSICAL THERAPY | Facility: HOSPITAL | Age: 37
End: 2017-11-20
Attending: PHYSICAL MEDICINE & REHABILITATION
Payer: COMMERCIAL

## 2017-11-20 DIAGNOSIS — S13.9XXD NECK SPRAIN, SUBSEQUENT ENCOUNTER: ICD-10-CM

## 2017-11-20 PROCEDURE — 97110 THERAPEUTIC EXERCISES: CPT

## 2017-11-20 PROCEDURE — 97014 ELECTRIC STIMULATION THERAPY: CPT
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
## 2017-11-22 ENCOUNTER — OFFICE VISIT (OUTPATIENT)
Dept: PHYSICAL THERAPY | Facility: HOSPITAL | Age: 37
End: 2017-11-22
Attending: PHYSICAL MEDICINE & REHABILITATION
Payer: COMMERCIAL

## 2017-11-22 DIAGNOSIS — S13.9XXD NECK SPRAIN, SUBSEQUENT ENCOUNTER: ICD-10-CM

## 2017-11-22 PROCEDURE — 97110 THERAPEUTIC EXERCISES: CPT

## 2017-11-22 PROCEDURE — 97035 APP MDLTY 1+ULTRASOUND EA 15: CPT

## 2017-11-22 PROCEDURE — 97014 ELECTRIC STIMULATION THERAPY: CPT

## 2017-11-27 ENCOUNTER — OFFICE VISIT (OUTPATIENT)
Dept: PHYSICAL THERAPY | Facility: HOSPITAL | Age: 37
End: 2017-11-27
Attending: PHYSICAL MEDICINE & REHABILITATION
Payer: COMMERCIAL

## 2017-11-27 ENCOUNTER — NURSE ONLY (OUTPATIENT)
Dept: INTERNAL MEDICINE CLINIC | Facility: CLINIC | Age: 37
End: 2017-11-27

## 2017-11-27 DIAGNOSIS — Z30.42 ENCOUNTER FOR DEPO-PROVERA CONTRACEPTION: Primary | ICD-10-CM

## 2017-11-27 DIAGNOSIS — S13.9XXD NECK SPRAIN, SUBSEQUENT ENCOUNTER: ICD-10-CM

## 2017-11-27 PROCEDURE — 96372 THER/PROPH/DIAG INJ SC/IM: CPT | Performed by: INTERNAL MEDICINE

## 2017-11-27 PROCEDURE — 97014 ELECTRIC STIMULATION THERAPY: CPT

## 2017-11-27 PROCEDURE — 97110 THERAPEUTIC EXERCISES: CPT

## 2017-11-27 RX ADMIN — MEDROXYPROGESTERONE ACETATE 150 MG: 150 INJECTION, SUSPENSION INTRAMUSCULAR at 09:25:00
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
## 2017-11-29 ENCOUNTER — OFFICE VISIT (OUTPATIENT)
Dept: PHYSICAL THERAPY | Facility: HOSPITAL | Age: 37
End: 2017-11-29
Attending: INTERNAL MEDICINE
Payer: COMMERCIAL

## 2017-11-29 PROCEDURE — 97110 THERAPEUTIC EXERCISES: CPT
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
## 2017-11-30 ENCOUNTER — OFFICE VISIT (OUTPATIENT)
Dept: NEUROLOGY | Facility: CLINIC | Age: 37
End: 2017-11-30

## 2017-11-30 ENCOUNTER — TELEPHONE (OUTPATIENT)
Dept: NEUROLOGY | Facility: CLINIC | Age: 37
End: 2017-11-30

## 2017-11-30 VITALS
RESPIRATION RATE: 16 BRPM | HEIGHT: 64 IN | SYSTOLIC BLOOD PRESSURE: 126 MMHG | DIASTOLIC BLOOD PRESSURE: 80 MMHG | BODY MASS INDEX: 40.97 KG/M2 | WEIGHT: 240 LBS | HEART RATE: 72 BPM

## 2017-11-30 DIAGNOSIS — M25.511 TRIGGER POINT OF RIGHT SHOULDER REGION: Primary | ICD-10-CM

## 2017-11-30 PROCEDURE — 99213 OFFICE O/P EST LOW 20 MIN: CPT | Performed by: PHYSICAL MEDICINE & REHABILITATION

## 2017-11-30 PROCEDURE — 20552 NJX 1/MLT TRIGGER POINT 1/2: CPT | Performed by: PHYSICAL MEDICINE & REHABILITATION

## 2017-11-30 RX ORDER — LIDOCAINE HYDROCHLORIDE 10 MG/ML
2 INJECTION, SOLUTION INFILTRATION; PERINEURAL ONCE
Status: COMPLETED | OUTPATIENT
Start: 2017-11-30 | End: 2017-11-30

## 2017-11-30 RX ORDER — BUPIVACAINE HYDROCHLORIDE 5 MG/ML
2 INJECTION, SOLUTION EPIDURAL; INTRACAUDAL ONCE
Status: COMPLETED | OUTPATIENT
Start: 2017-11-30 | End: 2017-11-30

## 2017-11-30 RX ORDER — TRIAMCINOLONE ACETONIDE 40 MG/ML
40 INJECTION, SUSPENSION INTRA-ARTICULAR; INTRAMUSCULAR ONCE
Status: COMPLETED | OUTPATIENT
Start: 2017-11-30 | End: 2017-11-30

## 2017-11-30 NOTE — TELEPHONE ENCOUNTER
Called [facility] for authorization of approval of right upper trapezius trigger point injection cpt code 87193,. Talked to Mara Frazier. who states no authorization is required. Reference #  [id number]. Will inform Nursing.

## 2017-11-30 NOTE — PATIENT INSTRUCTIONS
1) I will see you in two weeks to see if the trigger point injections helped. You may use tylenol if you have pain from the injection sites. Avoid ibuprofen for 24 hours.     2) If you do not see a change in your symptoms we may need to order a neck MRI for photo ID at time of . If a designated family member will be picking up prescription, office must be given name of individual in advance and they must present an ID as well.   · The name of the person picking up your prescription must be documented in

## 2017-11-30 NOTE — PROGRESS NOTES
No referring provider defined for this encounter. Laurie Pierre MD    Chief Complaint: Neck Pain    HPI:  Vani Ramires is a 42year old female who presents with complaints of Neck Pain (LOV: 10/24/17.  Patient is here for a f/u on neck strain, R chaim /90 (BP Location: Right arm, Patient Position: Sitting, Cuff Size: adult)   Pulse 72   Resp 16   Ht 64\"   Wt 240 lb   Breastfeeding? No   BMI 41.20 kg/m²   Pain Score: The pain is currently  4/10.     GEN:  Patient appears stated age, answers my ques Risks and benefits of the procedure were discussed with the patient and consent was obtained. Trigger points were palpated and marked along the right trapezius. Using betadine swabs, injection sites were cleaned in sterile fashion.  Using a 27 gauge 1 1/2\"
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
## 2017-12-04 ENCOUNTER — OFFICE VISIT (OUTPATIENT)
Dept: INTERNAL MEDICINE CLINIC | Facility: CLINIC | Age: 37
End: 2017-12-04

## 2017-12-04 VITALS
WEIGHT: 241 LBS | DIASTOLIC BLOOD PRESSURE: 83 MMHG | HEART RATE: 82 BPM | SYSTOLIC BLOOD PRESSURE: 137 MMHG | RESPIRATION RATE: 20 BRPM | BODY MASS INDEX: 41.15 KG/M2 | HEIGHT: 64 IN | TEMPERATURE: 97 F

## 2017-12-04 DIAGNOSIS — I10 ESSENTIAL HYPERTENSION: ICD-10-CM

## 2017-12-04 DIAGNOSIS — S13.9XXD NECK SPRAIN, SUBSEQUENT ENCOUNTER: Primary | ICD-10-CM

## 2017-12-04 PROCEDURE — 99214 OFFICE O/P EST MOD 30 MIN: CPT | Performed by: INTERNAL MEDICINE

## 2017-12-04 PROCEDURE — 99212 OFFICE O/P EST SF 10 MIN: CPT | Performed by: INTERNAL MEDICINE

## 2017-12-04 RX ORDER — SPIRONOLACTONE 25 MG/1
25 TABLET ORAL DAILY
Qty: 90 TABLET | Refills: 0 | Status: SHIPPED | OUTPATIENT
Start: 2017-12-04 | End: 2018-03-01

## 2017-12-04 RX ORDER — OMEPRAZOLE 40 MG/1
CAPSULE, DELAYED RELEASE ORAL
Qty: 180 CAPSULE | Refills: 1 | Status: SHIPPED | OUTPATIENT
Start: 2017-12-04 | End: 2018-05-16

## 2017-12-04 NOTE — PROGRESS NOTES
HPI:    Patient ID: Thierno Cesar is a 42year old female. Hypertension   This is a chronic problem. The current episode started more than 1 year ago. The problem has been gradually improving since onset. The problem is controlled.  Associated symptoms Psychiatric/Behavioral: Negative for confusion. The patient is not nervous/anxious.                Current Outpatient Prescriptions:  Omeprazole 40 MG Oral Capsule Delayed Release TAKE 1 CAPSULE(40 MG) BY MOUTH TWICE DAILY 30 TO 60 MINUTES BEFORE BREAKFAST Abdominal: Soft. She exhibits no mass. There is no hepatosplenomegaly. There is no tenderness. There is no CVA tenderness. Musculoskeletal: She exhibits no edema. Cervical back: She exhibits tenderness (mild ) and spasm.  She exhibits normal range Sig: TAKE 1 CAPSULE(40 MG) BY MOUTH TWICE DAILY 30 TO 60 MINUTES BEFORE BREAKFAST AND AT BEDTIME ON AN EMPTY STOMACH      spironolactone 25 MG Oral Tab 90 tablet 0      Sig: Take 1 tablet (25 mg total) by mouth daily.            Imaging & Referrals:  Non

## 2017-12-11 ENCOUNTER — OFFICE VISIT (OUTPATIENT)
Dept: OPTOMETRY | Facility: CLINIC | Age: 37
End: 2017-12-11

## 2017-12-11 ENCOUNTER — MED REC SCAN ONLY (OUTPATIENT)
Dept: NEUROLOGY | Facility: CLINIC | Age: 37
End: 2017-12-11

## 2017-12-11 DIAGNOSIS — S06.0X0A CONCUSSION WITHOUT LOSS OF CONSCIOUSNESS, INITIAL ENCOUNTER: Primary | ICD-10-CM

## 2017-12-11 DIAGNOSIS — H52.13 MYOPIA OF BOTH EYES: ICD-10-CM

## 2017-12-11 PROCEDURE — 92015 DETERMINE REFRACTIVE STATE: CPT | Performed by: OPTOMETRIST

## 2017-12-11 PROCEDURE — 92004 COMPRE OPH EXAM NEW PT 1/>: CPT | Performed by: OPTOMETRIST
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
## 2017-12-12 ENCOUNTER — OFFICE VISIT (OUTPATIENT)
Dept: SURGERY | Facility: CLINIC | Age: 37
End: 2017-12-12

## 2017-12-12 ENCOUNTER — APPOINTMENT (OUTPATIENT)
Dept: LAB | Facility: HOSPITAL | Age: 37
End: 2017-12-12
Attending: INTERNAL MEDICINE
Payer: COMMERCIAL

## 2017-12-12 VITALS
DIASTOLIC BLOOD PRESSURE: 70 MMHG | HEART RATE: 72 BPM | WEIGHT: 240.25 LBS | BODY MASS INDEX: 41.02 KG/M2 | RESPIRATION RATE: 16 BRPM | SYSTOLIC BLOOD PRESSURE: 108 MMHG | HEIGHT: 64 IN | OXYGEN SATURATION: 100 %

## 2017-12-12 DIAGNOSIS — E78.1 HYPERTRIGLYCERIDEMIA: ICD-10-CM

## 2017-12-12 DIAGNOSIS — R73.09 ABNORMAL BLOOD SUGAR: ICD-10-CM

## 2017-12-12 DIAGNOSIS — K21.9 GASTROESOPHAGEAL REFLUX DISEASE WITHOUT ESOPHAGITIS: ICD-10-CM

## 2017-12-12 DIAGNOSIS — E66.01 MORBID OBESITY WITH BMI OF 40.0-44.9, ADULT (HCC): ICD-10-CM

## 2017-12-12 DIAGNOSIS — I10 ESSENTIAL HYPERTENSION: Primary | ICD-10-CM

## 2017-12-12 DIAGNOSIS — I10 ESSENTIAL HYPERTENSION: ICD-10-CM

## 2017-12-12 PROCEDURE — 36415 COLL VENOUS BLD VENIPUNCTURE: CPT

## 2017-12-12 PROCEDURE — 80053 COMPREHEN METABOLIC PANEL: CPT

## 2017-12-12 PROCEDURE — 99204 OFFICE O/P NEW MOD 45 MIN: CPT | Performed by: INTERNAL MEDICINE

## 2017-12-12 NOTE — PROGRESS NOTES
[facility] Consultation Note       Date of Consult:  2017    Patient:  Mike Aldridge  :      1980  MRN:      [id number]    Referring Provider: Dr. Thor Albright       Chief Complaint:  Patient presents with:  Consult  Weigh Ondansetron HCl (ZOFRAN) 8 MG tablet Take 1 tablet (8 mg total) by mouth every 8 (eight) hours as needed for Nausea. Disp: 10 tablet Rfl: 0   loratadine (CLARITIN) 10 MG Oral Tab Take 10 mg by mouth daily.  Disp:  Rfl:        Allergies:  Azithromycin; Cip Maintain a daily food journal, No drinking 30 minutes before or after meals, Utilize portion control strategies to reduce calorie intake, Identify triggers for eating and manage cues and Eat slowly and take 20 to 30 minutes to complete each meal      ROS: medication. She was encouraged to avoid caffeine products, elevated head of bed and not eat for 1 hour before bedtime. No interval changes were made in her anti-reflux medications. Hypertrig: will start low carb diet    Goals for next month:  1.  Keep a

## 2017-12-14 ENCOUNTER — OFFICE VISIT (OUTPATIENT)
Dept: NEUROLOGY | Facility: CLINIC | Age: 37
End: 2017-12-14

## 2017-12-14 VITALS
HEART RATE: 80 BPM | BODY MASS INDEX: 40.97 KG/M2 | DIASTOLIC BLOOD PRESSURE: 76 MMHG | SYSTOLIC BLOOD PRESSURE: 118 MMHG | WEIGHT: 240 LBS | HEIGHT: 64 IN | RESPIRATION RATE: 16 BRPM

## 2017-12-14 DIAGNOSIS — M79.18 MYOFASCIAL PAIN SYNDROME, CERVICAL: ICD-10-CM

## 2017-12-14 DIAGNOSIS — S13.4XXD WHIPLASH INJURY SYNDROME, SUBSEQUENT ENCOUNTER: Primary | ICD-10-CM

## 2017-12-14 DIAGNOSIS — M25.511 TRIGGER POINT OF RIGHT SHOULDER REGION: ICD-10-CM

## 2017-12-14 PROCEDURE — 99213 OFFICE O/P EST LOW 20 MIN: CPT | Performed by: PHYSICAL MEDICINE & REHABILITATION

## 2017-12-14 NOTE — PATIENT INSTRUCTIONS
1) Follow up in 4 weeks if needed; if you continue to have discomfort we will repeat the right upper shoulder trigger point injections. 2) Walking and running are fine. Refrain from strenuous upper body exercises for a month.     3) Continue your home ex [facility]. · Patient must present photo ID at time of . If a designated family member will be picking up prescription, office must be given name of individual in advance and they must present an ID as well.   · The name of the person picking up your

## 2017-12-14 NOTE — PROGRESS NOTES
No referring provider defined for this encounter. Celio Menjivar MD    Chief Complaint: neck pain    HPI:  Mikie Hoffmann is a 42year old female who presents with complaints of Neck Pain (LOV: 11/30/17.  Patient is here for a post-injection f/u pt had Skin: No rash and ulceration noted in the posterior neck or at the right upper shoulder.   Observation: No obvious atrophy in the shoulder, upper extremity, or lower extremity muscles  Palpation: Pain with palpation of the right upper trapezius without loca

## 2017-12-19 ENCOUNTER — OFFICE VISIT (OUTPATIENT)
Dept: PULMONOLOGY | Facility: CLINIC | Age: 37
End: 2017-12-19

## 2017-12-19 VITALS
BODY MASS INDEX: 40.74 KG/M2 | OXYGEN SATURATION: 99 % | WEIGHT: 238.63 LBS | DIASTOLIC BLOOD PRESSURE: 77 MMHG | RESPIRATION RATE: 18 BRPM | SYSTOLIC BLOOD PRESSURE: 119 MMHG | HEIGHT: 64 IN | HEART RATE: 97 BPM

## 2017-12-19 DIAGNOSIS — R06.83 SNORING: Primary | ICD-10-CM

## 2017-12-19 PROCEDURE — 99212 OFFICE O/P EST SF 10 MIN: CPT | Performed by: INTERNAL MEDICINE

## 2017-12-19 PROCEDURE — 99243 OFF/OP CNSLTJ NEW/EST LOW 30: CPT | Performed by: INTERNAL MEDICINE

## 2017-12-19 NOTE — PROGRESS NOTES
Dear  Jigna Allen  :           As you know, Josiah Low is a 38-year-old female who I am now evaluating for snoring and question of sleep disordered breathing. HISTORY OF PRESENT ILLNESS: The patient had a history of chronic fatigue which is now somewhat better. is unremarkable with pupils equal round and reactive to light and accommodation. Neck without adenopathy, thyromegaly, JVD nor bruit. Lungs clear to auscultation and percussion. Cardiac regular rate and rhythm no murmur.  Abdomen nontender, without hepatosp

## 2018-01-11 ENCOUNTER — OFFICE VISIT (OUTPATIENT)
Dept: NEUROLOGY | Facility: CLINIC | Age: 38
End: 2018-01-11

## 2018-01-11 ENCOUNTER — TELEPHONE (OUTPATIENT)
Dept: NEUROLOGY | Facility: CLINIC | Age: 38
End: 2018-01-11

## 2018-01-11 VITALS
SYSTOLIC BLOOD PRESSURE: 138 MMHG | HEART RATE: 68 BPM | BODY MASS INDEX: 40.97 KG/M2 | DIASTOLIC BLOOD PRESSURE: 90 MMHG | RESPIRATION RATE: 16 BRPM | HEIGHT: 64 IN | WEIGHT: 240 LBS

## 2018-01-11 DIAGNOSIS — S13.9XXD NECK SPRAIN, SUBSEQUENT ENCOUNTER: Primary | ICD-10-CM

## 2018-01-11 PROCEDURE — 20552 NJX 1/MLT TRIGGER POINT 1/2: CPT | Performed by: PHYSICAL MEDICINE & REHABILITATION

## 2018-01-11 PROCEDURE — 99213 OFFICE O/P EST LOW 20 MIN: CPT | Performed by: PHYSICAL MEDICINE & REHABILITATION

## 2018-01-11 RX ORDER — LIDOCAINE HYDROCHLORIDE 10 MG/ML
2 INJECTION, SOLUTION INFILTRATION; PERINEURAL ONCE
Status: COMPLETED | OUTPATIENT
Start: 2018-01-11 | End: 2018-01-11

## 2018-01-11 RX ORDER — TRIAMCINOLONE ACETONIDE 40 MG/ML
40 INJECTION, SUSPENSION INTRA-ARTICULAR; INTRAMUSCULAR ONCE
Status: COMPLETED | OUTPATIENT
Start: 2018-01-11 | End: 2018-01-11

## 2018-01-11 RX ORDER — BUPIVACAINE HYDROCHLORIDE 5 MG/ML
2 INJECTION, SOLUTION EPIDURAL; INTRACAUDAL ONCE
Status: COMPLETED | OUTPATIENT
Start: 2018-01-11 | End: 2018-01-11
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
## 2018-01-11 NOTE — TELEPHONE ENCOUNTER
Called [phone] for Authorization of Approval for Right upper trapezius trigger point injections with CPT codes  /  /  [phone], t/t Miriam ZUNIGA stated No Authorization needed with Ref #[id number], patient is in Office now having the proce

## 2018-02-07 ENCOUNTER — TELEPHONE (OUTPATIENT)
Dept: INTERNAL MEDICINE CLINIC | Facility: CLINIC | Age: 38
End: 2018-02-07

## 2018-02-07 ENCOUNTER — TELEPHONE (OUTPATIENT)
Dept: NEUROLOGY | Facility: CLINIC | Age: 38
End: 2018-02-07

## 2018-02-07 DIAGNOSIS — M54.2 CERVICALGIA: Primary | ICD-10-CM

## 2018-02-08 ENCOUNTER — TELEPHONE (OUTPATIENT)
Dept: NEUROLOGY | Facility: CLINIC | Age: 38
End: 2018-02-08

## 2018-02-08 ENCOUNTER — OFFICE VISIT (OUTPATIENT)
Dept: NEUROLOGY | Facility: CLINIC | Age: 38
End: 2018-02-08

## 2018-02-08 VITALS
DIASTOLIC BLOOD PRESSURE: 100 MMHG | SYSTOLIC BLOOD PRESSURE: 140 MMHG | HEIGHT: 64 IN | RESPIRATION RATE: 16 BRPM | WEIGHT: 240 LBS | BODY MASS INDEX: 40.97 KG/M2 | HEART RATE: 88 BPM

## 2018-02-08 DIAGNOSIS — M25.511 CHRONIC RIGHT SHOULDER PAIN: Primary | ICD-10-CM

## 2018-02-08 DIAGNOSIS — M99.01 SOMATIC DYSFUNCTION OF CERVICAL REGION: ICD-10-CM

## 2018-02-08 DIAGNOSIS — S13.9XXD NECK SPRAIN, SUBSEQUENT ENCOUNTER: ICD-10-CM

## 2018-02-08 DIAGNOSIS — G89.29 CHRONIC RIGHT SHOULDER PAIN: Primary | ICD-10-CM

## 2018-02-08 DIAGNOSIS — M99.07 SOMATIC DYSFUNCTION OF UPPER EXTREMITY: ICD-10-CM

## 2018-02-08 DIAGNOSIS — M99.02 SOMATIC DYSFUNCTION OF THORACIC REGION: ICD-10-CM

## 2018-02-08 PROCEDURE — 99213 OFFICE O/P EST LOW 20 MIN: CPT | Performed by: PHYSICAL MEDICINE & REHABILITATION

## 2018-02-08 PROCEDURE — 98926 OSTEOPATH MANJ 3-4 REGIONS: CPT | Performed by: PHYSICAL MEDICINE & REHABILITATION

## 2018-02-08 NOTE — TELEPHONE ENCOUNTER
Called [employer]for authorization of approval of OMT to cervical and thoracic regions cpt code 80698. Talked to Jan Aceves. who states no authorization is required. Reference # [id number]. Will  inform Nursing.

## 2018-02-08 NOTE — TELEPHONE ENCOUNTER
Called [employer] for authorization of right subacromial bursa injection cpt codes 35076, [id number]. T/t Irene STAHL who states no authorization is required. Reference # [id number].  Will inform Nursing

## 2018-02-08 NOTE — PATIENT INSTRUCTIONS
As of October 6th 2014, the Drug Enforcement Agency [facility]) is reclassifying all hydrocodone combination medications from Schedule III to Schedule II. This includes medications such as Norco, Vicodin, Lortab, Zohydro, and Vicoprofen.     What this means for y

## 2018-02-08 NOTE — PROGRESS NOTES
HPI:    Patient ID: Jelena Zamora is a 42year old female. This is a 56-year-old female with a history of right upper shoulder and neck pain who presents for follow-up.   I performed right upper trapezius trigger point injections on 11/30/2017 and 1/11/ every 8 (eight) hours as needed for Nausea. Disp: 10 tablet Rfl: 0   loratadine (CLARITIN) 10 MG Oral Tab Take 10 mg by mouth daily.  Disp:  Rfl:      Allergies:  Azithromycin            Nausea and vomiting  Ciprofloxacin           Nausea and vomiting  Sulf patient stated the pain in the right upper shoulder was mildly worse. Instructed her she might be sore as I did a lot to the upper back and neck today which should subside. Ordered Xray of the right shoulder as she has + shoulder impingement signs.  She

## 2018-02-12 ENCOUNTER — HOSPITAL ENCOUNTER (OUTPATIENT)
Dept: GENERAL RADIOLOGY | Facility: HOSPITAL | Age: 38
Discharge: HOME | End: 2018-02-12
Attending: PHYSICAL MEDICINE & REHABILITATION
Payer: COMMERCIAL

## 2018-02-12 DIAGNOSIS — G89.29 CHRONIC RIGHT SHOULDER PAIN: ICD-10-CM

## 2018-02-12 DIAGNOSIS — M25.511 CHRONIC RIGHT SHOULDER PAIN: ICD-10-CM

## 2018-02-12 PROCEDURE — 73030 X-RAY EXAM OF SHOULDER: CPT | Performed by: PHYSICAL MEDICINE & REHABILITATION

## 2018-02-13 ENCOUNTER — TELEPHONE (OUTPATIENT)
Dept: NEUROLOGY | Facility: CLINIC | Age: 38
End: 2018-02-13

## 2018-02-13 NOTE — TELEPHONE ENCOUNTER
----- Message from Adiel Alcala DO sent at 2/13/2018  8:42 AM CST -----  Please let the patient know she has a small calcification along one of the rotator cuff muscles that may be related to the pain she is having along the shoulder.  The plan remains th

## 2018-02-22 ENCOUNTER — NURSE ONLY (OUTPATIENT)
Dept: INTERNAL MEDICINE CLINIC | Facility: CLINIC | Age: 38
End: 2018-02-22

## 2018-02-22 DIAGNOSIS — Z30.42 ENCOUNTER FOR DEPO-PROVERA CONTRACEPTION: Primary | ICD-10-CM

## 2018-02-22 PROCEDURE — 96372 THER/PROPH/DIAG INJ SC/IM: CPT | Performed by: INTERNAL MEDICINE

## 2018-02-22 RX ADMIN — MEDROXYPROGESTERONE ACETATE 150 MG: 150 INJECTION, SUSPENSION INTRAMUSCULAR at 09:02:00

## 2018-02-22 NOTE — PROGRESS NOTES
Pt came in for Depo inj well tolerated in office. I informed pt to return on May 10- May 24. Pt verbal understanding. Lat Depo was 02/27/2017 pt was on time.

## 2018-02-23 ENCOUNTER — MED REC SCAN ONLY (OUTPATIENT)
Dept: NEUROLOGY | Facility: CLINIC | Age: 38
End: 2018-02-23

## 2018-02-23 ENCOUNTER — OFFICE VISIT (OUTPATIENT)
Dept: NEUROLOGY | Facility: CLINIC | Age: 38
End: 2018-02-23

## 2018-02-23 VITALS
BODY MASS INDEX: 40.97 KG/M2 | SYSTOLIC BLOOD PRESSURE: 110 MMHG | DIASTOLIC BLOOD PRESSURE: 76 MMHG | WEIGHT: 240 LBS | HEART RATE: 66 BPM | RESPIRATION RATE: 16 BRPM | HEIGHT: 64 IN

## 2018-02-23 DIAGNOSIS — G89.29 CHRONIC RIGHT SHOULDER PAIN: Primary | ICD-10-CM

## 2018-02-23 DIAGNOSIS — M75.41 SHOULDER IMPINGEMENT SYNDROME, RIGHT: ICD-10-CM

## 2018-02-23 DIAGNOSIS — M25.511 CHRONIC RIGHT SHOULDER PAIN: Primary | ICD-10-CM

## 2018-02-23 PROCEDURE — 99213 OFFICE O/P EST LOW 20 MIN: CPT | Performed by: PHYSICAL MEDICINE & REHABILITATION

## 2018-02-23 PROCEDURE — 20611 DRAIN/INJ JOINT/BURSA W/US: CPT | Performed by: PHYSICAL MEDICINE & REHABILITATION

## 2018-02-23 RX ORDER — TRIAMCINOLONE ACETONIDE 40 MG/ML
40 INJECTION, SUSPENSION INTRA-ARTICULAR; INTRAMUSCULAR ONCE
Status: COMPLETED | OUTPATIENT
Start: 2018-02-23 | End: 2018-02-23

## 2018-02-23 RX ORDER — LIDOCAINE HYDROCHLORIDE 10 MG/ML
3 INJECTION, SOLUTION INFILTRATION; PERINEURAL ONCE
Status: COMPLETED | OUTPATIENT
Start: 2018-02-23 | End: 2018-02-23

## 2018-02-23 NOTE — PROGRESS NOTES
HPI:    Patient ID: Gray Duque is a 42year old female. This is a 15-year-old female with a history of right upper shoulder and neck pain who presents for follow-up.      Summary of treatments given:  1) right upper trapezius trigger point injections Multiple Vitamins-Minerals (MULTIVITAMIN GUMMIES WOMENS OR) Take by mouth daily. Disp:  Rfl:    Ondansetron HCl (ZOFRAN) 8 MG tablet Take 1 tablet (8 mg total) by mouth every 8 (eight) hours as needed for Nausea.  Disp: 10 tablet Rfl: 0   loratadine (CLAR investigate further into the cervical spine.     Shoulder injection procedure note    Right Shoulder subacromial injection   Diagnosis: Right shoulder impingement syndrome    After consent was obtained, using sterile technique the right shoulder was prepped

## 2018-03-01 DIAGNOSIS — I10 ESSENTIAL HYPERTENSION: ICD-10-CM

## 2018-03-01 NOTE — TELEPHONE ENCOUNTER
Pharmacy called to request medication below be refilled. Pharmacy states they attempted to send request via [facility] on 2/28/18 but it failed. Please advise.      Current Outpatient Prescriptions:   •  spironolactone 25 MG Oral Tab, Take 1 tablet (25 mg

## 2018-03-01 NOTE — TELEPHONE ENCOUNTER
Refill Protocol Appointment Criteria  · Appointment scheduled in the past 6 months or in the next 3 months  Recent Outpatient Visits            6 days ago Chronic right shoulder pain    DELANEY Llamas, [address], [address]

## 2018-03-04 RX ORDER — SPIRONOLACTONE 25 MG/1
25 TABLET ORAL DAILY
Qty: 90 TABLET | Refills: 0 | Status: SHIPPED | OUTPATIENT
Start: 2018-03-04 | End: 2018-05-16

## 2018-03-19 NOTE — PROGRESS NOTES
PLEASE NOTE: Discharge Summary    Pt has attended 8 visits in Physical Therapy. Progress Note Start Date:11/6/17  End Date: 11/29/17    Subjective:  NOT APPLICABLE TODAY      Assessment: Patient was last seen on  11/29/17.   At that time she was progres

## 2018-03-23 ENCOUNTER — OFFICE VISIT (OUTPATIENT)
Dept: NEUROLOGY | Facility: CLINIC | Age: 38
End: 2018-03-23

## 2018-03-23 ENCOUNTER — APPOINTMENT (OUTPATIENT)
Dept: LAB | Facility: HOSPITAL | Age: 38
End: 2018-03-23
Attending: INTERNAL MEDICINE
Payer: COMMERCIAL

## 2018-03-23 VITALS
DIASTOLIC BLOOD PRESSURE: 88 MMHG | SYSTOLIC BLOOD PRESSURE: 126 MMHG | RESPIRATION RATE: 16 BRPM | WEIGHT: 240 LBS | HEART RATE: 58 BPM | BODY MASS INDEX: 40.97 KG/M2 | HEIGHT: 64 IN

## 2018-03-23 DIAGNOSIS — E78.1 HYPERTRIGLYCERIDEMIA: ICD-10-CM

## 2018-03-23 DIAGNOSIS — M75.30 CALCIFIC BURSITIS OF SHOULDER: Primary | ICD-10-CM

## 2018-03-23 DIAGNOSIS — I10 ESSENTIAL HYPERTENSION: ICD-10-CM

## 2018-03-23 DIAGNOSIS — E66.01 MORBID OBESITY WITH BMI OF 40.0-44.9, ADULT (HCC): ICD-10-CM

## 2018-03-23 DIAGNOSIS — K21.9 GASTROESOPHAGEAL REFLUX DISEASE WITHOUT ESOPHAGITIS: ICD-10-CM

## 2018-03-23 DIAGNOSIS — R73.09 ABNORMAL BLOOD SUGAR: ICD-10-CM

## 2018-03-23 LAB — HBA1C MFR BLD: 5.5 % (ref 4–6)

## 2018-03-23 PROCEDURE — 99213 OFFICE O/P EST LOW 20 MIN: CPT | Performed by: PHYSICAL MEDICINE & REHABILITATION

## 2018-03-23 PROCEDURE — 83036 HEMOGLOBIN GLYCOSYLATED A1C: CPT

## 2018-03-23 PROCEDURE — 36415 COLL VENOUS BLD VENIPUNCTURE: CPT

## 2018-03-23 NOTE — PROGRESS NOTES
HPI:    Patient ID: Catherine Smith is a 42year old female.     This is a 40-year-old female with a history of right upper shoulder and neck pain who presents for follow-up.      Summary of treatments given:  1) right upper trapezius trigger point injection Multiple Vitamins-Minerals (MULTIVITAMIN GUMMIES WOMENS OR) Take by mouth daily. Disp:  Rfl:    Ondansetron HCl (ZOFRAN) 8 MG tablet Take 1 tablet (8 mg total) by mouth every 8 (eight) hours as needed for Nausea.  Disp: 10 tablet Rfl: 0   loratadine (CLAR [place]

## 2018-04-06 ENCOUNTER — OFFICE VISIT (OUTPATIENT)
Dept: SURGERY | Facility: CLINIC | Age: 38
End: 2018-04-06

## 2018-04-06 VITALS
WEIGHT: 224 LBS | HEIGHT: 64 IN | HEART RATE: 85 BPM | BODY MASS INDEX: 38.24 KG/M2 | DIASTOLIC BLOOD PRESSURE: 83 MMHG | SYSTOLIC BLOOD PRESSURE: 154 MMHG | RESPIRATION RATE: 16 BRPM | OXYGEN SATURATION: 100 %

## 2018-04-06 DIAGNOSIS — E66.01 MORBID OBESITY WITH BMI OF 40.0-44.9, ADULT (HCC): ICD-10-CM

## 2018-04-06 DIAGNOSIS — I10 ESSENTIAL HYPERTENSION: Primary | ICD-10-CM

## 2018-04-06 DIAGNOSIS — K21.9 GASTROESOPHAGEAL REFLUX DISEASE WITHOUT ESOPHAGITIS: ICD-10-CM

## 2018-04-06 DIAGNOSIS — E78.1 HYPERTRIGLYCERIDEMIA: ICD-10-CM

## 2018-04-06 PROCEDURE — 99214 OFFICE O/P EST MOD 30 MIN: CPT | Performed by: INTERNAL MEDICINE

## 2018-04-06 NOTE — PROGRESS NOTES
[address], [facility]  [address]  [facility] [address] [facility] [address]  Dept: [phone]     Date:   2018    Patient:  Maribell Hill  :      1980  MRN:      [id number]    Chief Complaint:  Pa 8 (eight) hours as needed for Nausea. Disp: 10 tablet Rfl: 0   loratadine (CLARITIN) 10 MG Oral Tab Take 10 mg by mouth daily.  Disp:  Rfl:      Allergies:  Azithromycin; Ciprofloxacin; Sulfasalazine     Social History:      Social History  Social History breakfast, Eat 3 meals per day, Plan meals in advance, Read nutrition labels, Drink 64 oz of water per day, Maintain a daily food journal, No drinking 30 minutes before or after meals, Utlize portion control strategies to reduce calorie intake, Identify tr Results  Component Value Date/Time   CHOLEST 199 06/12/2017 09:42 AM    (H) 06/12/2017 09:42 AM   HDL 45 06/12/2017 09:42 AM   TRIG 153 (H) 06/12/2017 09:42 AM     GERD: The patient believes her reflux is somewhat better of at least no worse on curr

## 2018-04-12 ENCOUNTER — TELEPHONE (OUTPATIENT)
Dept: NEUROLOGY | Facility: CLINIC | Age: 38
End: 2018-04-12

## 2018-04-12 DIAGNOSIS — M54.2 CERVICALGIA: Primary | ICD-10-CM

## 2018-04-12 RX ORDER — DICLOFENAC SODIUM 75 MG/1
75 TABLET, DELAYED RELEASE ORAL 2 TIMES DAILY
Qty: 30 TABLET | Refills: 0 | Status: SHIPPED | OUTPATIENT
Start: 2018-04-12 | End: 2018-04-13 | Stop reason: RX

## 2018-04-12 NOTE — TELEPHONE ENCOUNTER
Pt calling stating that she was taking a shower today and she heard something \"pop\" in her neck and then her pain returned. Shooting pain starting from neck and down right side of arm. Wondering what she should do and would like to speak with someone.  Pl
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
## 2018-04-12 NOTE — TELEPHONE ENCOUNTER
Offer her an appointment tomorrow morning at 8:30am. I will place an order for an anti-inflammatory (diclofenac) that she should take with food. If she hasn't taken omeprazole today then she should take omeprazole first, and then the anti-inflammatory.

## 2018-04-13 ENCOUNTER — OFFICE VISIT (OUTPATIENT)
Dept: NEUROLOGY | Facility: CLINIC | Age: 38
End: 2018-04-13

## 2018-04-13 ENCOUNTER — HOSPITAL ENCOUNTER (OUTPATIENT)
Dept: GENERAL RADIOLOGY | Facility: HOSPITAL | Age: 38
Discharge: HOME | End: 2018-04-13
Attending: PHYSICAL MEDICINE & REHABILITATION
Payer: COMMERCIAL

## 2018-04-13 VITALS
SYSTOLIC BLOOD PRESSURE: 124 MMHG | HEART RATE: 89 BPM | HEIGHT: 64 IN | BODY MASS INDEX: 38.24 KG/M2 | DIASTOLIC BLOOD PRESSURE: 84 MMHG | WEIGHT: 224 LBS | RESPIRATION RATE: 16 BRPM

## 2018-04-13 DIAGNOSIS — M54.2 NECK PAIN, ACUTE: ICD-10-CM

## 2018-04-13 DIAGNOSIS — M54.2 NECK PAIN, ACUTE: Primary | ICD-10-CM

## 2018-04-13 DIAGNOSIS — S13.9XXD NECK SPRAIN, SUBSEQUENT ENCOUNTER: ICD-10-CM

## 2018-04-13 PROCEDURE — 99213 OFFICE O/P EST LOW 20 MIN: CPT | Performed by: PHYSICAL MEDICINE & REHABILITATION

## 2018-04-13 PROCEDURE — 72050 X-RAY EXAM NECK SPINE 4/5VWS: CPT | Performed by: PHYSICAL MEDICINE & REHABILITATION

## 2018-04-13 RX ORDER — TIZANIDINE HYDROCHLORIDE 2 MG/1
2 CAPSULE, GELATIN COATED ORAL 3 TIMES DAILY PRN
Qty: 40 CAPSULE | Refills: 0 | Status: SHIPPED | OUTPATIENT
Start: 2018-04-13 | End: 2019-01-07 | Stop reason: ALTCHOICE

## 2018-04-13 RX ORDER — METHYLPREDNISOLONE 4 MG/1
TABLET ORAL
Qty: 1 PACKAGE | Refills: 0 | Status: SHIPPED | OUTPATIENT
Start: 2018-04-13 | End: 2019-01-07 | Stop reason: ALTCHOICE
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
## 2018-04-13 NOTE — PATIENT INSTRUCTIONS
1) Call my office in the last week of April and if you are still having pain I will order an MRI of your neck. 2) Stop the ibuprofen while you're taking the medrol dosepak.     As of October 6th 2014, the Drug Enforcement Agency [facility]) is reclassifying a must be given name of individual in advance and they must present an ID as well. · The name of the person picking up your prescription must be documented in your chart.

## 2018-04-16 ENCOUNTER — TELEPHONE (OUTPATIENT)
Dept: NEUROLOGY | Facility: CLINIC | Age: 38
End: 2018-04-16

## 2018-04-16 NOTE — TELEPHONE ENCOUNTER
----- Message from Petey Parrish DO sent at 4/13/2018  4:34 PM CDT -----  Please let the patient know that the Xray results unchanged from her previous films. There are no dislocations of any of the bones in the neck.

## 2018-04-16 NOTE — TELEPHONE ENCOUNTER
Called patient to notify her of her cervical x-ray results. Notified her that there were no changes from previous films and no dislocations of any of the bones in her neck. Pt verbalized understanding and was happy of the results.

## 2018-04-24 ENCOUNTER — TELEPHONE (OUTPATIENT)
Dept: INTERNAL MEDICINE CLINIC | Facility: CLINIC | Age: 38
End: 2018-04-24

## 2018-04-24 NOTE — TELEPHONE ENCOUNTER
Pt was contacted and informed that she will be able to get her Depo injection on the same day as her physical and understanding was voiced.

## 2018-04-24 NOTE — TELEPHONE ENCOUNTER
Pt called to schedule px appt (scheduled for 5/16) and would like to make sure she can have her week 12 depo shot at that appt. Pt requesting a call back to confirm. Please advise.

## 2018-05-08 ENCOUNTER — OFFICE VISIT (OUTPATIENT)
Dept: NEUROLOGY | Facility: CLINIC | Age: 38
End: 2018-05-08

## 2018-05-08 VITALS
WEIGHT: 222 LBS | HEIGHT: 64 IN | HEART RATE: 68 BPM | RESPIRATION RATE: 16 BRPM | DIASTOLIC BLOOD PRESSURE: 84 MMHG | SYSTOLIC BLOOD PRESSURE: 118 MMHG | BODY MASS INDEX: 37.9 KG/M2

## 2018-05-08 DIAGNOSIS — S13.9XXD NECK SPRAIN, SUBSEQUENT ENCOUNTER: Primary | ICD-10-CM

## 2018-05-08 PROCEDURE — 99213 OFFICE O/P EST LOW 20 MIN: CPT | Performed by: PHYSICAL MEDICINE & REHABILITATION
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
## 2018-05-16 ENCOUNTER — OFFICE VISIT (OUTPATIENT)
Dept: INTERNAL MEDICINE CLINIC | Facility: CLINIC | Age: 38
End: 2018-05-16

## 2018-05-16 VITALS
SYSTOLIC BLOOD PRESSURE: 134 MMHG | HEIGHT: 64 IN | WEIGHT: 219.69 LBS | HEART RATE: 68 BPM | DIASTOLIC BLOOD PRESSURE: 86 MMHG | RESPIRATION RATE: 20 BRPM | BODY MASS INDEX: 37.51 KG/M2 | TEMPERATURE: 98 F

## 2018-05-16 DIAGNOSIS — I10 ESSENTIAL HYPERTENSION: ICD-10-CM

## 2018-05-16 DIAGNOSIS — Z30.42 ENCOUNTER FOR DEPO-PROVERA CONTRACEPTION: ICD-10-CM

## 2018-05-16 DIAGNOSIS — Z00.00 PE (PHYSICAL EXAM), ROUTINE: Primary | ICD-10-CM

## 2018-05-16 DIAGNOSIS — E66.09 CLASS 1 OBESITY DUE TO EXCESS CALORIES WITHOUT SERIOUS COMORBIDITY IN ADULT, UNSPECIFIED BMI: ICD-10-CM

## 2018-05-16 DIAGNOSIS — K21.9 GASTROESOPHAGEAL REFLUX DISEASE WITHOUT ESOPHAGITIS: ICD-10-CM

## 2018-05-16 PROCEDURE — 99212 OFFICE O/P EST SF 10 MIN: CPT | Performed by: INTERNAL MEDICINE

## 2018-05-16 PROCEDURE — 96372 THER/PROPH/DIAG INJ SC/IM: CPT | Performed by: INTERNAL MEDICINE

## 2018-05-16 PROCEDURE — 99395 PREV VISIT EST AGE 18-39: CPT | Performed by: INTERNAL MEDICINE

## 2018-05-16 PROCEDURE — 81025 URINE PREGNANCY TEST: CPT | Performed by: INTERNAL MEDICINE

## 2018-05-16 RX ORDER — SPIRONOLACTONE 25 MG/1
25 TABLET ORAL DAILY
Qty: 90 TABLET | Refills: 1 | Status: SHIPPED | OUTPATIENT
Start: 2018-05-16 | End: 2018-12-02

## 2018-05-16 RX ORDER — OMEPRAZOLE 40 MG/1
CAPSULE, DELAYED RELEASE ORAL
Qty: 180 CAPSULE | Refills: 1 | Status: SHIPPED | OUTPATIENT
Start: 2018-05-16 | End: 2019-01-07

## 2018-05-16 RX ADMIN — MEDROXYPROGESTERONE ACETATE 150 MG: 150 INJECTION, SUSPENSION INTRAMUSCULAR at 10:38:00

## 2018-05-16 NOTE — PROGRESS NOTES
HPI:    Patient ID: Porfirio Garcia is a 42year old female.    Patient patient presents today for physical exam, states doing well otherwise, denies chest pain, shortness of breath, dyspnea on exertion or heart palpitations also denies nausea, vomiting, sonia confusion. The patient is not nervous/anxious. Current Outpatient Prescriptions:  spironolactone 25 MG Oral Tab Take 1 tablet (25 mg total) by mouth daily.  Disp: 90 tablet Rfl: 1   Omeprazole 40 MG Oral Capsule Delayed Release TAKE 1 CAPSULE( Neck supple. No JVD present. No thyromegaly present. Cardiovascular: Normal rate and normal heart sounds. No murmur heard. Pulmonary/Chest: Effort normal and breath sounds normal. No respiratory distress. She has no wheezes. She has no rales.    Abdom Patient verbalized understanding of all instructions and plan of care     Class 1 obesity due to excess calories without serious comorbidity in adult, unspecified bmi  Eat healthy, well balanced meals   Reduce daily calorie intake  (1500 calories per day

## 2018-05-16 NOTE — PROGRESS NOTES
Pt was given the Depo Provera 150 mg injection in the right upper outer gluteus and pt tolerated injection well. Pt was given the return dates (08/01-08/15/18) and understanding was voiced per pt.

## 2018-06-04 ENCOUNTER — OFFICE VISIT (OUTPATIENT)
Dept: SURGERY | Facility: CLINIC | Age: 38
End: 2018-06-04

## 2018-06-04 VITALS
HEART RATE: 90 BPM | WEIGHT: 220 LBS | SYSTOLIC BLOOD PRESSURE: 140 MMHG | HEIGHT: 64 IN | RESPIRATION RATE: 16 BRPM | OXYGEN SATURATION: 100 % | DIASTOLIC BLOOD PRESSURE: 80 MMHG | BODY MASS INDEX: 37.56 KG/M2

## 2018-06-04 DIAGNOSIS — E66.01 MORBID OBESITY WITH BMI OF 40.0-44.9, ADULT (HCC): ICD-10-CM

## 2018-06-04 DIAGNOSIS — R06.83 SNORING: ICD-10-CM

## 2018-06-04 DIAGNOSIS — K21.9 GASTROESOPHAGEAL REFLUX DISEASE WITHOUT ESOPHAGITIS: ICD-10-CM

## 2018-06-04 DIAGNOSIS — I10 ESSENTIAL HYPERTENSION: Primary | ICD-10-CM

## 2018-06-04 PROCEDURE — 99213 OFFICE O/P EST LOW 20 MIN: CPT | Performed by: INTERNAL MEDICINE

## 2018-06-04 NOTE — PROGRESS NOTES
[address], [facility]  [address] [facility] [address] [facility] [address]  Dept: [phone]     Date:   2018    Patient:  Leydi Mayo  :      1980  MRN:      [id number]    Chief Complaint:  Pa Package Rfl: 0   DiphenhydrAMINE HCl (BENADRYL ALLERGY OR) Take by mouth daily as needed. Disp:  Rfl:    Multiple Vitamins-Minerals (MULTIVITAMIN GUMMIES WOMENS OR) Take by mouth daily.  Disp:  Rfl:    Ondansetron HCl (ZOFRAN) 8 MG tablet Take 1 tablet (8 m only:  yes  · Toughest challenge:  Sweet tooth, granola bars    Nutritional Goals  Limit carbohydrates to 100 gms per day, Eat 100-200 calories within 1 hour of waking  and Eat 3-4 cups of fresh fruits or vegetables daily    Behavior Modifications Reviewed traditional weight loss at this time. HYPERTENSION: Blood pressure stable on the above medications. No interval change in antihypertensive medication. DYSLIPIDEMIA: Stable on the above prescribed meal plan. Liver function stable.       Lab Results

## 2018-08-07 ENCOUNTER — NURSE ONLY (OUTPATIENT)
Dept: INTERNAL MEDICINE CLINIC | Facility: CLINIC | Age: 38
End: 2018-08-07
Payer: COMMERCIAL

## 2018-08-07 DIAGNOSIS — Z30.9 ENCOUNTER FOR CONTRACEPTIVE MANAGEMENT, UNSPECIFIED TYPE: Primary | ICD-10-CM

## 2018-08-07 PROCEDURE — 96372 THER/PROPH/DIAG INJ SC/IM: CPT | Performed by: INTERNAL MEDICINE

## 2018-08-07 RX ADMIN — MEDROXYPROGESTERONE ACETATE 150 MG: 150 INJECTION, SUSPENSION INTRAMUSCULAR at 17:56:00

## 2018-08-07 NOTE — PROGRESS NOTES
Patient here today for her 3 months Depo. Injection was given in right side buttock area. Patient tolerated well no complains. Patient was given sticky of next window for depo from 10/23- 11/5. Patient verbalized understanding.

## 2018-10-30 ENCOUNTER — NURSE ONLY (OUTPATIENT)
Dept: INTERNAL MEDICINE CLINIC | Facility: CLINIC | Age: 38
End: 2018-10-30
Payer: COMMERCIAL

## 2018-10-30 DIAGNOSIS — Z30.42 SURVEILLANCE FOR DEPO-PROVERA CONTRACEPTION: Primary | ICD-10-CM

## 2018-10-30 PROCEDURE — 81025 URINE PREGNANCY TEST: CPT | Performed by: INTERNAL MEDICINE

## 2018-10-30 PROCEDURE — 96372 THER/PROPH/DIAG INJ SC/IM: CPT | Performed by: INTERNAL MEDICINE

## 2018-10-30 RX ORDER — MEDROXYPROGESTERONE ACETATE 150 MG/ML
150 INJECTION, SUSPENSION INTRAMUSCULAR
Status: SHIPPED | OUTPATIENT
Start: 2018-10-30 | End: 2020-01-23

## 2018-10-30 RX ADMIN — MEDROXYPROGESTERONE ACETATE 150 MG: 150 INJECTION, SUSPENSION INTRAMUSCULAR at 09:18:00

## 2018-10-30 NOTE — PROGRESS NOTES
Pt was verified per name, date of birth. Urine pregnancy test was obtained with negative results. Pt was given the Depo Provera 150 mg injection in the upper right outer gluteus and pt tolerated the injection well.  Pt was given the dates for next injection

## 2018-12-02 DIAGNOSIS — I10 ESSENTIAL HYPERTENSION: ICD-10-CM

## 2018-12-03 RX ORDER — SPIRONOLACTONE 25 MG/1
TABLET ORAL
Qty: 30 TABLET | Refills: 0 | Status: SHIPPED | OUTPATIENT
Start: 2018-12-03 | End: 2019-01-07

## 2019-01-06 DIAGNOSIS — I10 ESSENTIAL HYPERTENSION: ICD-10-CM

## 2019-01-07 ENCOUNTER — OFFICE VISIT (OUTPATIENT)
Dept: INTERNAL MEDICINE CLINIC | Facility: CLINIC | Age: 39
End: 2019-01-07
Payer: COMMERCIAL

## 2019-01-07 VITALS
HEIGHT: 64 IN | RESPIRATION RATE: 20 BRPM | TEMPERATURE: 99 F | SYSTOLIC BLOOD PRESSURE: 130 MMHG | HEART RATE: 76 BPM | BODY MASS INDEX: 38.41 KG/M2 | DIASTOLIC BLOOD PRESSURE: 80 MMHG | WEIGHT: 225 LBS

## 2019-01-07 DIAGNOSIS — Z12.31 SCREENING MAMMOGRAM, ENCOUNTER FOR: ICD-10-CM

## 2019-01-07 DIAGNOSIS — K21.9 GASTROESOPHAGEAL REFLUX DISEASE WITHOUT ESOPHAGITIS: ICD-10-CM

## 2019-01-07 DIAGNOSIS — I10 ESSENTIAL HYPERTENSION: ICD-10-CM

## 2019-01-07 DIAGNOSIS — Z12.4 PAP SMEAR FOR CERVICAL CANCER SCREENING: ICD-10-CM

## 2019-01-07 DIAGNOSIS — L91.8 SKIN TAG: Primary | ICD-10-CM

## 2019-01-07 PROCEDURE — 99214 OFFICE O/P EST MOD 30 MIN: CPT | Performed by: INTERNAL MEDICINE

## 2019-01-07 PROCEDURE — 99212 OFFICE O/P EST SF 10 MIN: CPT | Performed by: INTERNAL MEDICINE

## 2019-01-07 RX ORDER — SPIRONOLACTONE 25 MG/1
TABLET ORAL
Qty: 30 TABLET | Refills: 0 | Status: SHIPPED | OUTPATIENT
Start: 2019-01-07 | End: 2019-02-04

## 2019-01-07 RX ORDER — SPIRONOLACTONE 25 MG/1
TABLET ORAL
Qty: 30 TABLET | Refills: 0 | OUTPATIENT
Start: 2019-01-07

## 2019-01-07 RX ORDER — ONDANSETRON HYDROCHLORIDE 8 MG/1
8 TABLET, FILM COATED ORAL EVERY 8 HOURS PRN
Qty: 10 TABLET | Refills: 0 | Status: CANCELLED | OUTPATIENT
Start: 2019-01-07

## 2019-01-07 RX ORDER — OMEPRAZOLE 40 MG/1
CAPSULE, DELAYED RELEASE ORAL
Qty: 180 CAPSULE | Refills: 1 | Status: SHIPPED | OUTPATIENT
Start: 2019-01-07 | End: 2020-05-28

## 2019-01-07 NOTE — PROGRESS NOTES
HPI:    Patient ID: Jasbir Cruz is a 34year old female. Patient presents with:  Cyst: Pt state that she has a knot behind her right knee that has been present for the past month.    Referral: Pend for approval   Medication Request: Pend for refill    P Azithromycin            NAUSEA AND VOMITING  Ciprofloxacin           NAUSEA AND VOMITING  Sulfasalazine           PAIN   PHYSICAL EXAM:   Physical Exam   Constitutional: She is oriented to person, place, and time.  She appears well-developed and well-nouris Blood pressure 131/87, pulse 76, temperature 98.6 °F (37 °C), temperature source Oral, resp. rate 20, height 5' 4\" (1.626 m), weight 225 lb (102.1 kg), not currently breastfeeding. ASSESSMENT/PLAN:   1.  Skin tag  Face  Refer  To    Derm   - DE - Omeprazole 40 MG Oral Capsule Delayed Release; TAKE 1 CAPSULE(40 MG) BY MOUTH TWICE DAILY 30 TO 60 MINUTES BEFORE BREAKFAST AND AT BEDTIME ON AN EMPTY STOMACH  Dispense: 180 capsule;  Refill: 1  - URINALYSIS, ROUTINE; Future  - GASTRO - INTERNAL    Orders

## 2019-01-11 ENCOUNTER — LAB ENCOUNTER (OUTPATIENT)
Dept: LAB | Age: 39
End: 2019-01-11
Attending: INTERNAL MEDICINE
Payer: COMMERCIAL

## 2019-01-11 DIAGNOSIS — I10 ESSENTIAL HYPERTENSION: ICD-10-CM

## 2019-01-11 DIAGNOSIS — K21.9 GASTROESOPHAGEAL REFLUX DISEASE WITHOUT ESOPHAGITIS: ICD-10-CM

## 2019-01-11 LAB
ALBUMIN SERPL BCP-MCNC: 3.9 G/DL (ref 3.5–4.8)
ALBUMIN/GLOB SERPL: 1.3 {RATIO} (ref 1–2)
ALP SERPL-CCNC: 57 U/L (ref 32–100)
ALT SERPL-CCNC: 14 U/L (ref 14–54)
ANION GAP SERPL CALC-SCNC: 12 MMOL/L (ref 0–18)
AST SERPL-CCNC: 16 U/L (ref 15–41)
BASOPHILS # BLD: 0 K/UL (ref 0–0.2)
BASOPHILS NFR BLD: 1 %
BILIRUB SERPL-MCNC: 0.5 MG/DL (ref 0.3–1.2)
BILIRUB UR QL: NEGATIVE
BUN SERPL-MCNC: 6 MG/DL (ref 8–20)
BUN/CREAT SERPL: 6.4 (ref 10–20)
CALCIUM SERPL-MCNC: 9.5 MG/DL (ref 8.5–10.5)
CHLORIDE SERPL-SCNC: 106 MMOL/L (ref 95–110)
CHOLEST SERPL-MCNC: 225 MG/DL (ref 110–200)
CO2 SERPL-SCNC: 21 MMOL/L (ref 22–32)
COLOR UR: YELLOW
CREAT SERPL-MCNC: 0.94 MG/DL (ref 0.5–1.5)
EOSINOPHIL # BLD: 0 K/UL (ref 0–0.7)
EOSINOPHIL NFR BLD: 1 %
ERYTHROCYTE [DISTWIDTH] IN BLOOD BY AUTOMATED COUNT: 13.3 % (ref 11–15)
GLOBULIN PLAS-MCNC: 2.9 G/DL (ref 2.5–3.7)
GLUCOSE SERPL-MCNC: 84 MG/DL (ref 70–99)
GLUCOSE UR-MCNC: NEGATIVE MG/DL
HCT VFR BLD AUTO: 37.1 % (ref 35–48)
HDLC SERPL-MCNC: 45 MG/DL
HGB BLD-MCNC: 11.6 G/DL (ref 12–16)
HGB UR QL STRIP.AUTO: NEGATIVE
KETONES UR-MCNC: 20 MG/DL
LDLC SERPL CALC-MCNC: 170 MG/DL (ref 0–99)
LYMPHOCYTES # BLD: 1.3 K/UL (ref 1–4)
LYMPHOCYTES NFR BLD: 37 %
MCH RBC QN AUTO: 23.4 PG (ref 27–32)
MCHC RBC AUTO-ENTMCNC: 31.3 G/DL (ref 32–37)
MCV RBC AUTO: 74.7 FL (ref 80–100)
MONOCYTES # BLD: 0.4 K/UL (ref 0–1)
MONOCYTES NFR BLD: 10 %
NEUTROPHILS # BLD AUTO: 1.8 K/UL (ref 1.8–7.7)
NEUTROPHILS NFR BLD: 50 %
NITRITE UR QL STRIP.AUTO: NEGATIVE
NONHDLC SERPL-MCNC: 180 MG/DL
OSMOLALITY UR CALC.SUM OF ELEC: 285 MOSM/KG (ref 275–295)
PATIENT FASTING: YES
PH UR: 5 [PH] (ref 5–8)
PLATELET # BLD AUTO: 214 K/UL (ref 140–400)
PMV BLD AUTO: 11.8 FL (ref 7.4–10.3)
POTASSIUM SERPL-SCNC: 3.3 MMOL/L (ref 3.3–5.1)
PROT SERPL-MCNC: 6.8 G/DL (ref 5.9–8.4)
PROT UR-MCNC: NEGATIVE MG/DL
RBC # BLD AUTO: 4.97 M/UL (ref 3.7–5.4)
RBC #/AREA URNS AUTO: 2 /HPF
SODIUM SERPL-SCNC: 139 MMOL/L (ref 136–144)
SP GR UR STRIP: 1.03 (ref 1–1.03)
TRIGL SERPL-MCNC: 48 MG/DL (ref 1–149)
TSH SERPL-ACNC: 0.88 UIU/ML (ref 0.45–5.33)
UROBILINOGEN UR STRIP-ACNC: <2
VIT C UR-MCNC: 40 MG/DL
WBC # BLD AUTO: 3.6 K/UL (ref 4–11)
WBC #/AREA URNS AUTO: 7 /HPF

## 2019-01-11 PROCEDURE — 84443 ASSAY THYROID STIM HORMONE: CPT

## 2019-01-11 PROCEDURE — 81001 URINALYSIS AUTO W/SCOPE: CPT

## 2019-01-11 PROCEDURE — 80061 LIPID PANEL: CPT

## 2019-01-11 PROCEDURE — 36415 COLL VENOUS BLD VENIPUNCTURE: CPT

## 2019-01-11 PROCEDURE — 80053 COMPREHEN METABOLIC PANEL: CPT

## 2019-01-11 PROCEDURE — 85025 COMPLETE CBC W/AUTO DIFF WBC: CPT

## 2019-01-14 NOTE — PROGRESS NOTES
Please call patient with blood test results. Kidney and liver function are normal,   Mild  Anemia and decreased wbc count - mildly   Cholesterol is elevated  And is worsening   sugar is normal,  Thyroid hormone is normal as well.    Urine is unclear- inc

## 2019-01-24 ENCOUNTER — NURSE ONLY (OUTPATIENT)
Dept: INTERNAL MEDICINE CLINIC | Facility: CLINIC | Age: 39
End: 2019-01-24
Payer: COMMERCIAL

## 2019-01-24 DIAGNOSIS — Z30.42 ENCOUNTER FOR DEPO-PROVERA CONTRACEPTION: Primary | ICD-10-CM

## 2019-01-24 LAB
CONTROL LINE PRESENT WITH A CLEAR BACKGROUND (YES/NO): YES YES/NO
KIT LOT #: NORMAL NUMERIC

## 2019-01-24 PROCEDURE — 81025 URINE PREGNANCY TEST: CPT | Performed by: INTERNAL MEDICINE

## 2019-01-24 PROCEDURE — 96372 THER/PROPH/DIAG INJ SC/IM: CPT | Performed by: INTERNAL MEDICINE

## 2019-01-24 RX ADMIN — MEDROXYPROGESTERONE ACETATE 150 MG: 150 INJECTION, SUSPENSION INTRAMUSCULAR at 16:24:00

## 2019-01-24 NOTE — PROGRESS NOTES
Patient presents to office for Depo-Provera injection. Urine HCG- Negative. Patient tolerated injection well no reactions. Patient provided with calendar date highlighted for next Depo injection. April 11- April 25.

## 2019-01-31 ENCOUNTER — OFFICE VISIT (OUTPATIENT)
Dept: DERMATOLOGY CLINIC | Facility: CLINIC | Age: 39
End: 2019-01-31
Payer: COMMERCIAL

## 2019-01-31 DIAGNOSIS — L98.9 PAINFUL SKIN LESION: Primary | ICD-10-CM

## 2019-01-31 DIAGNOSIS — L82.1 DERMATOSIS PAPULOSA NIGRA: ICD-10-CM

## 2019-01-31 DIAGNOSIS — D23.30 BENIGN NEOPLASM OF SKIN OF FACE: ICD-10-CM

## 2019-01-31 DIAGNOSIS — L91.8 INFLAMED ACROCHORDON: ICD-10-CM

## 2019-01-31 PROCEDURE — 99211 OFF/OP EST MAY X REQ PHY/QHP: CPT | Performed by: DERMATOLOGY

## 2019-01-31 PROCEDURE — 11200 RMVL SKIN TAGS UP TO&INC 15: CPT | Performed by: DERMATOLOGY

## 2019-02-04 DIAGNOSIS — I10 ESSENTIAL HYPERTENSION: ICD-10-CM

## 2019-02-04 RX ORDER — SPIRONOLACTONE 25 MG/1
TABLET ORAL
Qty: 30 TABLET | Refills: 2 | Status: SHIPPED | OUTPATIENT
Start: 2019-02-04 | End: 2019-05-03

## 2019-02-07 ENCOUNTER — OFFICE VISIT (OUTPATIENT)
Dept: INTERNAL MEDICINE CLINIC | Facility: CLINIC | Age: 39
End: 2019-02-07
Payer: COMMERCIAL

## 2019-02-07 VITALS
SYSTOLIC BLOOD PRESSURE: 135 MMHG | TEMPERATURE: 98 F | HEART RATE: 99 BPM | DIASTOLIC BLOOD PRESSURE: 85 MMHG | RESPIRATION RATE: 20 BRPM | HEIGHT: 64 IN | BODY MASS INDEX: 38.86 KG/M2 | WEIGHT: 227.63 LBS

## 2019-02-07 DIAGNOSIS — Z01.419 ENCOUNTER FOR GYNECOLOGICAL EXAMINATION: Primary | ICD-10-CM

## 2019-02-07 DIAGNOSIS — Z12.4 PAP SMEAR FOR CERVICAL CANCER SCREENING: ICD-10-CM

## 2019-02-07 PROCEDURE — 99396 PREV VISIT EST AGE 40-64: CPT | Performed by: INTERNAL MEDICINE

## 2019-02-07 NOTE — PROGRESS NOTES
HPI:    Patient ID: Te Staley is a 34year old female.   Patient presents with:  Gyn Exam: Pt is presenting today for a pap, last pap 2015      Gyn Exam   Chronicity: pap  smears per pt  always notmal  -  last  one  years  ago   Pertinent negatives inc VOMITING  Sulfasalazine           PAIN   PHYSICAL EXAM:   Physical Exam   Constitutional: She appears well-developed and well-nourished. No distress. Obese    Neck: No JVD present. No thyromegaly present.    Pulmonary/Chest: She exhibits no mass and no tony examination  (primary encounter diagnosis)  Pap smear for cervical cancer screening  Pap smear performed today breast exam as well  So breast exam education monthly  Patient education  Weight reduction advised  Patient had a last menstrual.  10 years ago s

## 2019-02-08 LAB — HPV I/H RISK 1 DNA SPEC QL NAA+PROBE: NEGATIVE

## 2019-02-11 NOTE — PROGRESS NOTES
Ailyn Lindsey is a 34year old female. Patient presents with:  Acrochordon: LOV: 3/6/15. Patient present for skin tag to face. Skin tag to right cheek is growing and becoming aggraved when washing face.  Patient requesting removal.             Pattie Disp:  Rfl:    SPIRONOLACTONE 25 MG Oral Tab TAKE 1 TABLET(25 MG) BY MOUTH DAILY Disp: 30 tablet Rfl: 2     Allergies:     Azithromycin            NAUSEA AND VOMITING  Ciprofloxacin           NAUSEA AND VOMITING  Sulfasalazine           PAIN    Past Medica Asked        Self-Exams: Not Asked    Social History Narrative      The patient does not use an assistive device. .        The patient does live in a home with stairs.     Family History   Problem Relation Age of Onset   • Hypertension Mother    • Anemia Mot and irritated. #2  Inflamed acrochordon      Multiple dermatosis papulosis nigra numerous papular lesions discussed alphahydroxy acid glycolic, citric, retinol, prescription Retin-A. Fact that these may become more inflamed discussed as well.   Cautery, sc

## 2019-04-16 ENCOUNTER — NURSE ONLY (OUTPATIENT)
Dept: INTERNAL MEDICINE CLINIC | Facility: CLINIC | Age: 39
End: 2019-04-16
Payer: COMMERCIAL

## 2019-04-16 DIAGNOSIS — Z30.42 SURVEILLANCE FOR DEPO-PROVERA CONTRACEPTION: Primary | ICD-10-CM

## 2019-04-16 PROCEDURE — 81025 URINE PREGNANCY TEST: CPT | Performed by: INTERNAL MEDICINE

## 2019-04-16 PROCEDURE — 96372 THER/PROPH/DIAG INJ SC/IM: CPT | Performed by: INTERNAL MEDICINE

## 2019-04-16 RX ORDER — MEDROXYPROGESTERONE ACETATE 150 MG/ML
150 INJECTION, SUSPENSION INTRAMUSCULAR
Status: SHIPPED | OUTPATIENT
Start: 2019-04-16

## 2019-04-16 RX ADMIN — MEDROXYPROGESTERONE ACETATE 150 MG: 150 INJECTION, SUSPENSION INTRAMUSCULAR at 16:44:00

## 2019-04-16 NOTE — PROGRESS NOTES
Urine pregnancy test was obtained with negative results. Depo Provera injection was given in the right upper outer gluteus and pt tolerated the injection well. Pt was given the next injection date 9)7/02/19-07/16/19) and understanding was voiced.

## 2019-05-03 DIAGNOSIS — I10 ESSENTIAL HYPERTENSION: ICD-10-CM

## 2019-05-03 RX ORDER — SPIRONOLACTONE 25 MG/1
TABLET ORAL
Qty: 90 TABLET | Refills: 1 | Status: SHIPPED | OUTPATIENT
Start: 2019-05-03 | End: 2019-10-29

## 2019-06-25 ENCOUNTER — TELEPHONE (OUTPATIENT)
Dept: INTERNAL MEDICINE CLINIC | Facility: CLINIC | Age: 39
End: 2019-06-25

## 2019-07-08 ENCOUNTER — NURSE ONLY (OUTPATIENT)
Dept: INTERNAL MEDICINE CLINIC | Facility: CLINIC | Age: 39
End: 2019-07-08
Payer: COMMERCIAL

## 2019-07-08 DIAGNOSIS — Z30.42 SURVEILLANCE FOR DEPO-PROVERA CONTRACEPTION: Primary | ICD-10-CM

## 2019-07-08 DIAGNOSIS — K21.9 GASTROESOPHAGEAL REFLUX DISEASE WITHOUT ESOPHAGITIS: ICD-10-CM

## 2019-07-08 PROCEDURE — 96372 THER/PROPH/DIAG INJ SC/IM: CPT | Performed by: INTERNAL MEDICINE

## 2019-07-08 PROCEDURE — 81025 URINE PREGNANCY TEST: CPT | Performed by: INTERNAL MEDICINE

## 2019-07-08 RX ORDER — MEDROXYPROGESTERONE ACETATE 150 MG/ML
150 INJECTION, SUSPENSION INTRAMUSCULAR
Status: SHIPPED | OUTPATIENT
Start: 2019-07-08

## 2019-07-08 RX ORDER — OMEPRAZOLE 40 MG/1
CAPSULE, DELAYED RELEASE ORAL
Qty: 180 CAPSULE | Refills: 1 | Status: SHIPPED | OUTPATIENT
Start: 2019-07-08 | End: 2019-10-01

## 2019-07-08 RX ADMIN — MEDROXYPROGESTERONE ACETATE 150 MG: 150 INJECTION, SUSPENSION INTRAMUSCULAR at 16:07:00

## 2019-07-08 NOTE — PROGRESS NOTES
Order was verified, along with pt's name. Pregnancy test was done with negative results. Pt was given the Depo Provera injection in the left upper outer gluteal at pt's request and pt tolerated the injection well.  Pt was given the next injection date (09/2

## 2019-07-08 NOTE — TELEPHONE ENCOUNTER
Refill passed per [facility] protocol.   Refill Protocol Appointment Criteria  · Appointment scheduled in the past 12 months or in the next 3 months  Recent Outpatient Visits            Today Surveillance for Depo-Provera contraception    [facility]

## 2019-10-01 NOTE — PROGRESS NOTES
HPI:    Patient ID: Blade Daily is a 37year old female.   Patient presents with:  Stress: Pt state that she is under a lot of stress and would like to see a therapist    Patient in office today for stress and  feeling  sad lately especially from [address]. constipation and diarrhea. Genitourinary: Negative for dysuria and frequency. Musculoskeletal: Negative for neck pain. Skin: Negative for pallor. Neurological: Negative for dizziness. Psychiatric/Behavioral: Positive for sleep disturbance.  Wilver Barros thyromegaly present. Cardiovascular: Normal rate, regular rhythm and normal heart sounds. No murmur heard. Pulmonary/Chest: Effort normal and breath sounds normal. No respiratory distress. She has no wheezes. She has no rales. Abdominal: Soft.  She e to see Gyne   - OBG - INTERNAL    3. Anemia, unspecified type  Labs to complete  Patient has history of low iron  She is not taking any iron pills at this time labs to complete  - FERRITIN  - VITAMIN B12    4.  Essential hypertension  Take high blood pressu

## 2019-10-01 NOTE — PROGRESS NOTES
Pt was given the Depo Provera 150 mg injection in the right upper outer gluteus and pt tolerated the injection well. Pt was given the date for the next injection (12/17-12/31/19) and understanding was voiced. Pregnancy test was done with negative results.

## 2019-10-29 DIAGNOSIS — I10 ESSENTIAL HYPERTENSION: ICD-10-CM

## 2019-10-31 RX ORDER — SPIRONOLACTONE 25 MG/1
TABLET ORAL
Qty: 90 TABLET | Refills: 1 | Status: SHIPPED | OUTPATIENT
Start: 2019-10-31 | End: 2020-04-27

## 2019-11-01 NOTE — TELEPHONE ENCOUNTER
Refill passed per [facility] protocol.   Hypertensive Medications  Protocol Criteria:  · Appointment scheduled in the past 6 months or in the next 3 months  · BMP or CMP in the past 12 months  · Creatinine result < 2  Recent Outpatient Visits

## 2019-12-27 ENCOUNTER — NURSE ONLY (OUTPATIENT)
Dept: INTERNAL MEDICINE CLINIC | Facility: CLINIC | Age: 39
End: 2019-12-27
Payer: COMMERCIAL

## 2019-12-27 DIAGNOSIS — Z30.42 SURVEILLANCE OF CONTRACEPTIVE INJECTION: Primary | ICD-10-CM

## 2019-12-27 PROCEDURE — 96372 THER/PROPH/DIAG INJ SC/IM: CPT | Performed by: INTERNAL MEDICINE

## 2019-12-27 RX ADMIN — MEDROXYPROGESTERONE ACETATE 150 MG: 150 INJECTION, SUSPENSION INTRAMUSCULAR at 10:48:00

## 2020-03-18 ENCOUNTER — NURSE ONLY (OUTPATIENT)
Dept: INTERNAL MEDICINE CLINIC | Facility: CLINIC | Age: 40
End: 2020-03-18
Payer: COMMERCIAL

## 2020-03-18 DIAGNOSIS — Z30.42 DEPOT CONTRACEPTION: Primary | ICD-10-CM

## 2020-03-18 PROCEDURE — 96372 THER/PROPH/DIAG INJ SC/IM: CPT | Performed by: INTERNAL MEDICINE

## 2020-03-18 RX ADMIN — MEDROXYPROGESTERONE ACETATE 150 MG: 150 INJECTION, SUSPENSION INTRAMUSCULAR at 14:58:00

## 2020-03-18 NOTE — PROGRESS NOTES
Patient is here for Depo-Provera. Name,  and order verified. Depo given right due for next one between -2020. Patient tolerated well.

## 2020-04-27 DIAGNOSIS — I10 ESSENTIAL HYPERTENSION: ICD-10-CM

## 2020-04-27 RX ORDER — SPIRONOLACTONE 25 MG/1
TABLET ORAL
Qty: 90 TABLET | Refills: 0 | Status: SHIPPED | OUTPATIENT
Start: 2020-04-27 | End: 2020-07-26

## 2020-05-26 ENCOUNTER — TELEPHONE (OUTPATIENT)
Dept: INTERNAL MEDICINE CLINIC | Facility: CLINIC | Age: 40
End: 2020-05-26

## 2020-05-26 DIAGNOSIS — Z30.42 DEPOT CONTRACEPTION: Primary | ICD-10-CM

## 2020-05-26 NOTE — TELEPHONE ENCOUNTER
Dr. Berenice Grubbs, Please advise if ok to schedule patient. Order pended, please review and sign order if correct.

## 2020-05-27 DIAGNOSIS — K21.9 GASTROESOPHAGEAL REFLUX DISEASE WITHOUT ESOPHAGITIS: ICD-10-CM

## 2020-05-27 RX ORDER — MEDROXYPROGESTERONE ACETATE 150 MG/ML
150 INJECTION, SUSPENSION INTRAMUSCULAR
Status: SHIPPED | OUTPATIENT
Start: 2020-05-27

## 2020-05-27 NOTE — TELEPHONE ENCOUNTER
Approved  Depo shot - please check pregnancy urine test if neg can give Depo in stated period of time 6/3-6/17

## 2020-05-28 RX ORDER — OMEPRAZOLE 40 MG/1
CAPSULE, DELAYED RELEASE ORAL
Qty: 180 CAPSULE | Refills: 1 | Status: SHIPPED | OUTPATIENT
Start: 2020-05-28 | End: 2021-06-21

## 2020-06-10 ENCOUNTER — NURSE ONLY (OUTPATIENT)
Dept: INTERNAL MEDICINE CLINIC | Facility: CLINIC | Age: 40
End: 2020-06-10
Payer: COMMERCIAL

## 2020-06-10 DIAGNOSIS — Z30.42 SURVEILLANCE OF CONTRACEPTIVE INJECTION: Primary | ICD-10-CM

## 2020-06-10 PROCEDURE — 96372 THER/PROPH/DIAG INJ SC/IM: CPT | Performed by: INTERNAL MEDICINE

## 2020-06-10 RX ADMIN — MEDROXYPROGESTERONE ACETATE 150 MG: 150 INJECTION, SUSPENSION INTRAMUSCULAR at 08:37:00

## 2020-06-10 NOTE — PROGRESS NOTES
Pt in office today for depo provera shot only. Pt tolerated well. Pt given dates to come back. Between 8/26/20-9/9/20. Pt verbalized understanding.

## 2020-07-22 DIAGNOSIS — I10 ESSENTIAL HYPERTENSION: ICD-10-CM

## 2020-07-26 RX ORDER — SPIRONOLACTONE 25 MG/1
TABLET ORAL
Qty: 90 TABLET | Refills: 0 | Status: SHIPPED | OUTPATIENT
Start: 2020-07-26 | End: 2020-11-02

## 2020-08-11 ENCOUNTER — OFFICE VISIT (OUTPATIENT)
Dept: INTERNAL MEDICINE CLINIC | Facility: CLINIC | Age: 40
End: 2020-08-11
Payer: COMMERCIAL

## 2020-08-11 ENCOUNTER — APPOINTMENT (OUTPATIENT)
Dept: LAB | Age: 40
End: 2020-08-11
Attending: INTERNAL MEDICINE
Payer: COMMERCIAL

## 2020-08-11 VITALS
HEART RATE: 103 BPM | HEIGHT: 64 IN | BODY MASS INDEX: 41.6 KG/M2 | WEIGHT: 243.69 LBS | DIASTOLIC BLOOD PRESSURE: 81 MMHG | SYSTOLIC BLOOD PRESSURE: 148 MMHG

## 2020-08-11 DIAGNOSIS — D22.9 SKIN MOLE: ICD-10-CM

## 2020-08-11 DIAGNOSIS — L91.8 SKIN TAG: ICD-10-CM

## 2020-08-11 DIAGNOSIS — I10 ESSENTIAL HYPERTENSION: Primary | ICD-10-CM

## 2020-08-11 DIAGNOSIS — Z00.00 PE (PHYSICAL EXAM), ROUTINE: ICD-10-CM

## 2020-08-11 DIAGNOSIS — Z28.3 BEHIND ON IMMUNIZATIONS: ICD-10-CM

## 2020-08-11 DIAGNOSIS — Z12.31 SCREENING MAMMOGRAM, ENCOUNTER FOR: ICD-10-CM

## 2020-08-11 PROCEDURE — 80053 COMPREHEN METABOLIC PANEL: CPT | Performed by: INTERNAL MEDICINE

## 2020-08-11 PROCEDURE — 99213 OFFICE O/P EST LOW 20 MIN: CPT | Performed by: INTERNAL MEDICINE

## 2020-08-11 PROCEDURE — 85025 COMPLETE CBC W/AUTO DIFF WBC: CPT | Performed by: INTERNAL MEDICINE

## 2020-08-11 PROCEDURE — 90471 IMMUNIZATION ADMIN: CPT | Performed by: INTERNAL MEDICINE

## 2020-08-11 PROCEDURE — 99212 OFFICE O/P EST SF 10 MIN: CPT | Performed by: INTERNAL MEDICINE

## 2020-08-11 PROCEDURE — 36415 COLL VENOUS BLD VENIPUNCTURE: CPT | Performed by: INTERNAL MEDICINE

## 2020-08-11 PROCEDURE — 96127 BRIEF EMOTIONAL/BEHAV ASSMT: CPT | Performed by: INTERNAL MEDICINE

## 2020-08-11 PROCEDURE — 82607 VITAMIN B-12: CPT | Performed by: INTERNAL MEDICINE

## 2020-08-11 PROCEDURE — 84443 ASSAY THYROID STIM HORMONE: CPT | Performed by: INTERNAL MEDICINE

## 2020-08-11 PROCEDURE — 99396 PREV VISIT EST AGE 40-64: CPT | Performed by: INTERNAL MEDICINE

## 2020-08-11 PROCEDURE — 80061 LIPID PANEL: CPT | Performed by: INTERNAL MEDICINE

## 2020-08-11 PROCEDURE — 82728 ASSAY OF FERRITIN: CPT | Performed by: INTERNAL MEDICINE

## 2020-08-11 PROCEDURE — 90715 TDAP VACCINE 7 YRS/> IM: CPT | Performed by: INTERNAL MEDICINE

## 2020-08-11 RX ORDER — AMLODIPINE BESYLATE 5 MG/1
5 TABLET ORAL DAILY
Qty: 30 TABLET | Refills: 0 | Status: SHIPPED | OUTPATIENT
Start: 2020-08-11 | End: 2021-07-22

## 2020-08-11 NOTE — PROGRESS NOTES
HPI:    Patient ID: Leydi Mayo is a 37year old female.   Patient presents with:  Physical  HTN: having more stress and occassional headache  Derm Problem: skin tags, toenail      Patient presents today for physical exam, htn and skin tags  states doing • Ascorbic Acid (VITAMIN C ADULT GUMMIES OR) Take 2 tablets by mouth daily. • amLODIPine Besylate 5 MG Oral Tab Take 1 tablet (5 mg total) by mouth daily.  30 tablet 0   • SPIRONOLACTONE 25 MG Oral Tab TAKE 1 TABLET(25 MG) BY MOUTH DAILY 90 tablet 0   • Abdominal: Soft. She exhibits no mass. There is no hepatosplenomegaly. There is no tenderness. There is no CVA tenderness. Musculoskeletal:         General: No edema. Lymphadenopathy:     She has no cervical adenopathy.    Neurological: She is alert and DERM - INTERNAL    6.  Behind on immunizations  - TETANUS, DIPHTHERIA TOXOIDS AND ACELLULAR PERTUSIS VACCINE (TDAP), >7 YEARS, IM USE    Orders Placed This Encounter      TETANUS, DIPHTHERIA TOXOIDS AND ACELLULAR PERTUSIS VACCINE (TDAP), >7 YEARS, IM USE

## 2020-08-16 NOTE — PROGRESS NOTES
Please call patient with blood test results. Sugar kidneys and liver function are normal   electrolytes sodium and potassium are normal    There is mild anemia.   And it is improving  Vitamin B12 is elevated-can cut down the vitamin B12 to every other

## 2020-09-02 ENCOUNTER — OFFICE VISIT (OUTPATIENT)
Dept: INTERNAL MEDICINE CLINIC | Facility: CLINIC | Age: 40
End: 2020-09-02
Payer: COMMERCIAL

## 2020-09-02 VITALS
WEIGHT: 241 LBS | HEIGHT: 64 IN | SYSTOLIC BLOOD PRESSURE: 134 MMHG | HEART RATE: 108 BPM | BODY MASS INDEX: 41.15 KG/M2 | DIASTOLIC BLOOD PRESSURE: 84 MMHG

## 2020-09-02 DIAGNOSIS — E78.00 PURE HYPERCHOLESTEROLEMIA: ICD-10-CM

## 2020-09-02 DIAGNOSIS — Z30.42 SURVEILLANCE FOR DEPO-PROVERA CONTRACEPTION: ICD-10-CM

## 2020-09-02 DIAGNOSIS — I10 ESSENTIAL HYPERTENSION: Primary | ICD-10-CM

## 2020-09-02 LAB
CONTROL LINE PRESENT WITH A CLEAR BACKGROUND (YES/NO): YES YES/NO
PREGNANCY TEST, URINE: NEGATIVE

## 2020-09-02 PROCEDURE — 99214 OFFICE O/P EST MOD 30 MIN: CPT | Performed by: INTERNAL MEDICINE

## 2020-09-02 PROCEDURE — 3008F BODY MASS INDEX DOCD: CPT | Performed by: INTERNAL MEDICINE

## 2020-09-02 PROCEDURE — 3075F SYST BP GE 130 - 139MM HG: CPT | Performed by: INTERNAL MEDICINE

## 2020-09-02 PROCEDURE — 96372 THER/PROPH/DIAG INJ SC/IM: CPT | Performed by: INTERNAL MEDICINE

## 2020-09-02 PROCEDURE — 99212 OFFICE O/P EST SF 10 MIN: CPT | Performed by: INTERNAL MEDICINE

## 2020-09-02 PROCEDURE — 3079F DIAST BP 80-89 MM HG: CPT | Performed by: INTERNAL MEDICINE

## 2020-09-02 PROCEDURE — 81025 URINE PREGNANCY TEST: CPT | Performed by: INTERNAL MEDICINE

## 2020-09-02 RX ORDER — MEDROXYPROGESTERONE ACETATE 150 MG/ML
150 INJECTION, SUSPENSION INTRAMUSCULAR ONCE
Status: COMPLETED | OUTPATIENT
Start: 2020-09-02 | End: 2020-09-02

## 2020-09-02 RX ADMIN — MEDROXYPROGESTERONE ACETATE 150 MG: 150 INJECTION, SUSPENSION INTRAMUSCULAR at 08:57:00

## 2020-09-02 NOTE — PROGRESS NOTES
HPI:    Patient ID: Jasbir Cruz is a 41year old female.   Patient presents with:  Hypertension  Injection: Here for her Depo injection      Patient presents today for htn  states doing well otherwise, denies chest pain, shortness of breath, dyspnea on e Negative for dysuria and frequency. Musculoskeletal: Positive for back pain (lower ache with more  exercise  lately  but noting to be concerned ). Negative for neck pain. Skin: Negative for pallor. Neurological: Negative for dizziness and headaches. erythema. Eyes: Right eye exhibits no discharge. Left eye exhibits no discharge. No scleral icterus. Neck: Neck supple. No JVD present. No thyromegaly present. Cardiovascular: Normal rate, regular rhythm and normal heart sounds. No murmur heard.   P [E]      Meds This Visit:  Requested Prescriptions      No prescriptions requested or ordered in this encounter       Imaging & Referrals:  None       #[id number]

## 2020-09-26 ENCOUNTER — HOSPITAL ENCOUNTER (OUTPATIENT)
Dept: MAMMOGRAPHY | Facility: HOSPITAL | Age: 40
Discharge: HOME | End: 2020-09-26
Attending: INTERNAL MEDICINE
Payer: COMMERCIAL

## 2020-09-26 DIAGNOSIS — Z12.31 SCREENING MAMMOGRAM, ENCOUNTER FOR: ICD-10-CM

## 2020-09-26 PROCEDURE — 77067 SCR MAMMO BI INCL CAD: CPT | Performed by: INTERNAL MEDICINE

## 2020-09-26 PROCEDURE — 77063 BREAST TOMOSYNTHESIS BI: CPT | Performed by: INTERNAL MEDICINE

## 2020-10-09 ENCOUNTER — HOSPITAL ENCOUNTER (OUTPATIENT)
Dept: MAMMOGRAPHY | Facility: HOSPITAL | Age: 40
Discharge: HOME | End: 2020-10-09
Attending: INTERNAL MEDICINE
Payer: COMMERCIAL

## 2020-10-09 ENCOUNTER — HOSPITAL ENCOUNTER (OUTPATIENT)
Dept: ULTRASOUND IMAGING | Facility: HOSPITAL | Age: 40
Discharge: HOME | End: 2020-10-09
Attending: INTERNAL MEDICINE
Payer: COMMERCIAL

## 2020-10-09 DIAGNOSIS — R92.8 ABNORMAL MAMMOGRAM: ICD-10-CM

## 2020-10-09 PROCEDURE — 76642 ULTRASOUND BREAST LIMITED: CPT | Performed by: INTERNAL MEDICINE

## 2020-10-09 PROCEDURE — 77065 DX MAMMO INCL CAD UNI: CPT | Performed by: INTERNAL MEDICINE

## 2020-10-09 PROCEDURE — 77061 BREAST TOMOSYNTHESIS UNI: CPT | Performed by: INTERNAL MEDICINE

## 2020-10-12 ENCOUNTER — TELEPHONE (OUTPATIENT)
Dept: INTERNAL MEDICINE CLINIC | Facility: CLINIC | Age: 40
End: 2020-10-12

## 2020-10-12 DIAGNOSIS — R92.8 ABNORMAL MAMMOGRAM OF RIGHT BREAST: Primary | ICD-10-CM

## 2020-10-28 ENCOUNTER — OFFICE VISIT (OUTPATIENT)
Dept: DERMATOLOGY CLINIC | Facility: CLINIC | Age: 40
End: 2020-10-28
Payer: COMMERCIAL

## 2020-10-28 DIAGNOSIS — L98.9 PAINFUL SKIN LESION: ICD-10-CM

## 2020-10-28 DIAGNOSIS — D23.30 BENIGN NEOPLASM OF SKIN OF FACE: ICD-10-CM

## 2020-10-28 DIAGNOSIS — D23.60 BENIGN NEOPLASM OF SKIN OF UPPER LIMB, INCLUDING SHOULDER, UNSPECIFIED LATERALITY: ICD-10-CM

## 2020-10-28 DIAGNOSIS — L82.1 DERMATOSIS PAPULOSA NIGRA: ICD-10-CM

## 2020-10-28 DIAGNOSIS — D23.4 BENIGN NEOPLASM OF SCALP AND SKIN OF NECK: ICD-10-CM

## 2020-10-28 DIAGNOSIS — D23.5 BENIGN NEOPLASM OF SKIN OF TRUNK, EXCEPT SCROTUM: ICD-10-CM

## 2020-10-28 DIAGNOSIS — D23.70 BENIGN NEOPLASM OF SKIN OF LOWER LIMB, INCLUDING HIP, UNSPECIFIED LATERALITY: ICD-10-CM

## 2020-10-28 DIAGNOSIS — R22.2 MASS OF CHEST WALL: Primary | ICD-10-CM

## 2020-10-28 PROCEDURE — 99213 OFFICE O/P EST LOW 20 MIN: CPT | Performed by: DERMATOLOGY

## 2020-10-28 PROCEDURE — 99212 OFFICE O/P EST SF 10 MIN: CPT | Performed by: DERMATOLOGY

## 2020-11-02 DIAGNOSIS — I10 ESSENTIAL HYPERTENSION: ICD-10-CM

## 2020-11-02 RX ORDER — SPIRONOLACTONE 25 MG/1
25 TABLET ORAL DAILY
Qty: 90 TABLET | Refills: 0 | Status: SHIPPED | OUTPATIENT
Start: 2020-11-02 | End: 2021-01-20

## 2020-11-02 NOTE — TELEPHONE ENCOUNTER
Second request from pharmacy      Current Outpatient Medications:     •  SPIRONOLACTONE 25 MG Oral Tab, TAKE 1 TABLET(25 MG) BY MOUTH DAILY, Disp: 90 tablet, Rfl: 0

## 2020-11-03 ENCOUNTER — TELEPHONE (OUTPATIENT)
Dept: INTERNAL MEDICINE CLINIC | Facility: CLINIC | Age: 40
End: 2020-11-03

## 2020-11-08 NOTE — PROGRESS NOTES
Cass Perez is a 41year old female.     Patient presents with:  Derm Problem: pt c/o cyst on RT side of rib cage,  Full Skin Exam: full body check, no personal or family history of skin cancer, LOV 1/31/2019  Bump: pt c/o bump on back of LT leg,    Sk amLODIPine Besylate 5 MG Oral Tab Take 1 tablet (5 mg total) by mouth daily.  30 tablet 0   • OMEPRAZOLE 40 MG Oral Capsule Delayed Release TAKE 1 CAPSULE(40 MG) BY MOUTH TWICE DAILY 30 TO 60 MINUTES BEFORE BREAKFAST AND AT BEDTIME ON AN EMPTY STOMACH 180 c No      Sexual activity: Yes        Partners: Male        Birth control/protection: Depo Provera    Lifestyle      Physical activity        Days per week: Not on file        Minutes per session: Not on file      Stress: Not on file    Relationships      So check, no personal or family history of skin cancer, LOV 1/31/2019  Bump: pt c/o bump on back of LT leg,    Skin: LT big toe has a dark skin line       Past notes/ records and appropriate/relevant lab results including pathology and past body maps reviewed neck  Benign neoplasm of skin of face  Benign neoplasm of skin of upper limb, including shoulder, unspecified laterality  Benign neoplasm of skin of trunk, except scrotum  Dermatosis papulosa nigra    Assessment / plan:      History of inflamed acrochordon skin lesion  (primary encounter diagnosis)  Inflamed acrochordon  Benign neoplasm of skin of face    No orders of the defined types were placed in this encounter.       Results From Past 48 Hours:  No results found for this or any previous visit (from the p

## 2020-11-24 ENCOUNTER — NURSE ONLY (OUTPATIENT)
Dept: INTERNAL MEDICINE CLINIC | Facility: CLINIC | Age: 40
End: 2020-11-24
Payer: COMMERCIAL

## 2020-11-24 DIAGNOSIS — Z23 NEED FOR VACCINATION: Primary | ICD-10-CM

## 2020-11-24 PROCEDURE — 96372 THER/PROPH/DIAG INJ SC/IM: CPT | Performed by: INTERNAL MEDICINE

## 2020-11-24 RX ADMIN — MEDROXYPROGESTERONE ACETATE 150 MG: 150 INJECTION, SUSPENSION INTRAMUSCULAR at 08:33:00

## 2021-01-20 DIAGNOSIS — I10 ESSENTIAL HYPERTENSION: ICD-10-CM

## 2021-01-20 NOTE — TELEPHONE ENCOUNTER
Current Outpatient Medications:   •  spironolactone 25 MG Oral Tab, Take 1 tablet (25 mg total) by mouth daily. , Disp: 90 tablet, Rfl: 0  •

## 2021-01-22 RX ORDER — SPIRONOLACTONE 25 MG/1
25 TABLET ORAL DAILY
Qty: 90 TABLET | Refills: 1 | Status: SHIPPED | OUTPATIENT
Start: 2021-01-22 | End: 2021-07-22

## 2021-02-18 ENCOUNTER — NURSE ONLY (OUTPATIENT)
Dept: INTERNAL MEDICINE CLINIC | Facility: CLINIC | Age: 41
End: 2021-02-18
Payer: COMMERCIAL

## 2021-02-18 DIAGNOSIS — Z30.42 SURVEILLANCE OF CONTRACEPTIVE INJECTION: Primary | ICD-10-CM

## 2021-02-18 LAB
CONTROL LINE PRESENT WITH A CLEAR BACKGROUND (YES/NO): YES YES/NO
PREGNANCY TEST, URINE: NEGATIVE

## 2021-02-18 PROCEDURE — 96372 THER/PROPH/DIAG INJ SC/IM: CPT | Performed by: INTERNAL MEDICINE

## 2021-02-18 PROCEDURE — 81025 URINE PREGNANCY TEST: CPT | Performed by: INTERNAL MEDICINE

## 2021-02-18 RX ADMIN — MEDROXYPROGESTERONE ACETATE 150 MG: 150 INJECTION, SUSPENSION INTRAMUSCULAR at 09:30:00

## 2021-02-18 NOTE — PROGRESS NOTES
Pt arrived today at the office today for a Depo-Provera injection. Pt verified name and . UHCG was negative. Injection was given in the left deltoid. Pt tolerated injection well with no reactions.   Informed pt that next injection would be on May 6th

## 2021-03-11 NOTE — LETTER
1/17/2024          To Whom It May Concern:    Oscar Hair is currently under my medical care and may not return to work at this time.    Please excuse Oscar for 2 days.  She may return to work on 1/22/24  Activity is restricted as follows: none.    If you require additional information please contact our office.        Sincerely,    Francia Aparicio MD           Document generated by:  Francia Aparicio MD      Mrs. Concha Conway informed BS numbers look good per Dr. Arcelia Sheridan

## 2021-03-26 ENCOUNTER — TELEPHONE (OUTPATIENT)
Dept: INTERNAL MEDICINE CLINIC | Facility: CLINIC | Age: 41
End: 2021-03-26

## 2021-03-29 NOTE — TELEPHONE ENCOUNTER
Phoned the patient and scheduled her depo vaccine with the nurse for 5-6-21 at 4:00 at the [address] [place] [address],   [address] location.

## 2021-05-06 ENCOUNTER — NURSE ONLY (OUTPATIENT)
Dept: INTERNAL MEDICINE CLINIC | Facility: CLINIC | Age: 41
End: 2021-05-06
Payer: COMMERCIAL

## 2021-05-06 DIAGNOSIS — Z30.42 SURVEILLANCE OF CONTRACEPTIVE INJECTION: Primary | ICD-10-CM

## 2021-05-06 PROCEDURE — 96372 THER/PROPH/DIAG INJ SC/IM: CPT | Performed by: INTERNAL MEDICINE

## 2021-05-06 PROCEDURE — 81025 URINE PREGNANCY TEST: CPT | Performed by: INTERNAL MEDICINE

## 2021-05-06 RX ADMIN — MEDROXYPROGESTERONE ACETATE 150 MG: 150 INJECTION, SUSPENSION INTRAMUSCULAR at 16:27:00

## 2021-05-06 NOTE — PROGRESS NOTES
Patient presents to office for Depo-Provera injection. UHCG was negative. Order verified under MAR, patient tolerated injection well no reactions. Patient's next Depo-injection due July 22-Aug 5.

## 2021-06-21 DIAGNOSIS — K21.9 GASTROESOPHAGEAL REFLUX DISEASE WITHOUT ESOPHAGITIS: ICD-10-CM

## 2021-06-21 NOTE — TELEPHONE ENCOUNTER
Pt would like a refill on her omeprazole medication. Pt is out of medication.  Pharmacy: [facility] (listed)      Current Outpatient Medications   Medication Sig Dispense Refill   • OMEPRAZOLE 40 MG Oral Capsule Delayed Release TAKE 1 CAPSULE(40 MG

## 2021-06-22 RX ORDER — OMEPRAZOLE 40 MG/1
40 CAPSULE, DELAYED RELEASE ORAL 2 TIMES DAILY
Qty: 180 CAPSULE | Refills: 1 | Status: SHIPPED | OUTPATIENT
Start: 2021-06-22

## 2021-07-14 ENCOUNTER — LAB ENCOUNTER (OUTPATIENT)
Dept: LAB | Facility: HOSPITAL | Age: 41
End: 2021-07-14
Attending: INTERNAL MEDICINE
Payer: COMMERCIAL

## 2021-07-14 LAB
ALBUMIN SERPL-MCNC: 3.6 G/DL (ref 3.4–5)
ALBUMIN/GLOB SERPL: 0.9 {RATIO} (ref 1–2)
ALP LIVER SERPL-CCNC: 75 U/L
ALT SERPL-CCNC: 26 U/L
ANION GAP SERPL CALC-SCNC: 7 MMOL/L (ref 0–18)
AST SERPL-CCNC: 13 U/L (ref 15–37)
BILIRUB SERPL-MCNC: 0.6 MG/DL (ref 0.1–2)
BUN BLD-MCNC: 8 MG/DL (ref 7–18)
BUN/CREAT SERPL: 8.3 (ref 10–20)
CALCIUM BLD-MCNC: 9.9 MG/DL (ref 8.5–10.1)
CHLORIDE SERPL-SCNC: 108 MMOL/L (ref 98–112)
CHOLEST SMN-MCNC: 265 MG/DL (ref ?–200)
CO2 SERPL-SCNC: 26 MMOL/L (ref 21–32)
CREAT BLD-MCNC: 0.96 MG/DL
GLOBULIN PLAS-MCNC: 4 G/DL (ref 2.8–4.4)
GLUCOSE BLD-MCNC: 99 MG/DL (ref 70–99)
HDLC SERPL-MCNC: 51 MG/DL (ref 40–59)
LDLC SERPL CALC-MCNC: 202 MG/DL (ref ?–100)
M PROTEIN MFR SERPL ELPH: 7.6 G/DL (ref 6.4–8.2)
NONHDLC SERPL-MCNC: 214 MG/DL (ref ?–130)
OSMOLALITY SERPL CALC.SUM OF ELEC: 290 MOSM/KG (ref 275–295)
PATIENT FASTING Y/N/NP: YES
PATIENT FASTING Y/N/NP: YES
POTASSIUM SERPL-SCNC: 3.7 MMOL/L (ref 3.5–5.1)
SODIUM SERPL-SCNC: 141 MMOL/L (ref 136–145)
TRIGL SERPL-MCNC: 75 MG/DL (ref 30–149)
VLDLC SERPL CALC-MCNC: 16 MG/DL (ref 0–30)

## 2021-07-14 PROCEDURE — 80061 LIPID PANEL: CPT | Performed by: INTERNAL MEDICINE

## 2021-07-14 PROCEDURE — 36415 COLL VENOUS BLD VENIPUNCTURE: CPT | Performed by: INTERNAL MEDICINE

## 2021-07-14 PROCEDURE — 80053 COMPREHEN METABOLIC PANEL: CPT | Performed by: INTERNAL MEDICINE

## 2021-07-21 DIAGNOSIS — I10 ESSENTIAL HYPERTENSION: ICD-10-CM

## 2021-07-22 ENCOUNTER — OFFICE VISIT (OUTPATIENT)
Dept: INTERNAL MEDICINE CLINIC | Facility: CLINIC | Age: 41
End: 2021-07-22
Payer: COMMERCIAL

## 2021-07-22 DIAGNOSIS — Z30.42 DEPOT CONTRACEPTION: ICD-10-CM

## 2021-07-22 DIAGNOSIS — E78.00 PURE HYPERCHOLESTEROLEMIA: Primary | ICD-10-CM

## 2021-07-22 DIAGNOSIS — M54.50 BILATERAL LOW BACK PAIN WITHOUT SCIATICA, UNSPECIFIED CHRONICITY: ICD-10-CM

## 2021-07-22 DIAGNOSIS — I10 ESSENTIAL HYPERTENSION: ICD-10-CM

## 2021-07-22 DIAGNOSIS — L72.9 CYST OF SKIN: ICD-10-CM

## 2021-07-22 LAB
CONTROL LINE PRESENT WITH A CLEAR BACKGROUND (YES/NO): YES YES/NO
PREGNANCY TEST, URINE: NEGATIVE

## 2021-07-22 PROCEDURE — 96372 THER/PROPH/DIAG INJ SC/IM: CPT | Performed by: INTERNAL MEDICINE

## 2021-07-22 PROCEDURE — 3080F DIAST BP >= 90 MM HG: CPT | Performed by: INTERNAL MEDICINE

## 2021-07-22 PROCEDURE — 3008F BODY MASS INDEX DOCD: CPT | Performed by: INTERNAL MEDICINE

## 2021-07-22 PROCEDURE — 99214 OFFICE O/P EST MOD 30 MIN: CPT | Performed by: INTERNAL MEDICINE

## 2021-07-22 PROCEDURE — 81025 URINE PREGNANCY TEST: CPT | Performed by: INTERNAL MEDICINE

## 2021-07-22 PROCEDURE — 3077F SYST BP >= 140 MM HG: CPT | Performed by: INTERNAL MEDICINE

## 2021-07-22 RX ORDER — SPIRONOLACTONE 25 MG/1
25 TABLET ORAL DAILY
Qty: 90 TABLET | Refills: 1 | Status: SHIPPED | OUTPATIENT
Start: 2021-07-22 | End: 2021-08-09

## 2021-07-22 RX ORDER — ROSUVASTATIN CALCIUM 5 MG/1
5 TABLET, COATED ORAL NIGHTLY
Qty: 90 TABLET | Refills: 0 | Status: SHIPPED | OUTPATIENT
Start: 2021-07-22

## 2021-07-22 RX ORDER — AMLODIPINE BESYLATE 5 MG/1
5 TABLET ORAL DAILY
Qty: 90 TABLET | Refills: 0 | Status: SHIPPED | OUTPATIENT
Start: 2021-07-22 | End: 2021-10-21

## 2021-07-22 RX ADMIN — MEDROXYPROGESTERONE ACETATE 150 MG: 150 INJECTION, SUSPENSION INTRAMUSCULAR at 16:27:00

## 2021-07-22 NOTE — PROGRESS NOTES
HPI:    Patient ID: Jose Nicole is a 41year old female. Patient presents with:  Cyst: C/o a cyst on her right side of her abdomen. Stts she started to notice some pain. Low Back Pain: C/o lower back pain for 2 days. Recalls no injuries.       Kenny Cyst abdominal wall   Neurological: Negative for dizziness, numbness and paresthesias. Psychiatric/Behavioral: Negative for confusion. The patient is not nervous/anxious.              Current Outpatient Medications   Medication Sig Dispense Refill   • ALANNA eye: No discharge. Left eye: No discharge. Neck:      Thyroid: No thyromegaly. Vascular: No JVD. Cardiovascular:      Rate and Rhythm: Normal rate and regular rhythm. Heart sounds: Normal heart sounds. No murmur heard.      Pulmonary: pain   Rest, weight reduction   Tylenol as needed   If perisitnt xr lum spine   Physiatry for possible PT       recommend to  get covid 19  Vaccine - pt education   All  Question  Answered     Stable cpm     Orders Placed This Encounter      Comp Metabolic

## 2021-07-23 RX ORDER — SPIRONOLACTONE 25 MG/1
TABLET ORAL
Qty: 90 TABLET | Refills: 1 | Status: SHIPPED | OUTPATIENT
Start: 2021-07-23 | End: 2022-01-12

## 2021-07-27 VITALS
SYSTOLIC BLOOD PRESSURE: 150 MMHG | BODY MASS INDEX: 42.17 KG/M2 | HEART RATE: 88 BPM | DIASTOLIC BLOOD PRESSURE: 90 MMHG | WEIGHT: 247 LBS | HEIGHT: 64 IN

## 2021-08-09 ENCOUNTER — OFFICE VISIT (OUTPATIENT)
Dept: INTERNAL MEDICINE CLINIC | Facility: CLINIC | Age: 41
End: 2021-08-09
Payer: COMMERCIAL

## 2021-08-09 VITALS
HEIGHT: 64 IN | HEART RATE: 75 BPM | BODY MASS INDEX: 42.34 KG/M2 | WEIGHT: 248 LBS | SYSTOLIC BLOOD PRESSURE: 137 MMHG | DIASTOLIC BLOOD PRESSURE: 78 MMHG

## 2021-08-09 DIAGNOSIS — L72.9 CYST OF SKIN: ICD-10-CM

## 2021-08-09 DIAGNOSIS — I10 PRIMARY HYPERTENSION: Primary | ICD-10-CM

## 2021-08-09 DIAGNOSIS — E78.00 PURE HYPERCHOLESTEROLEMIA: ICD-10-CM

## 2021-08-09 PROCEDURE — 3078F DIAST BP <80 MM HG: CPT | Performed by: INTERNAL MEDICINE

## 2021-08-09 PROCEDURE — 99214 OFFICE O/P EST MOD 30 MIN: CPT | Performed by: INTERNAL MEDICINE

## 2021-08-09 PROCEDURE — 3008F BODY MASS INDEX DOCD: CPT | Performed by: INTERNAL MEDICINE

## 2021-08-09 PROCEDURE — 3075F SYST BP GE 130 - 139MM HG: CPT | Performed by: INTERNAL MEDICINE

## 2021-08-09 NOTE — PROGRESS NOTES
HPI:    Patient ID: Te Staley is a 41year old female.   Patient presents with    Patient presents with:  Hypertension        Patient presents today for   HTN   Patient states she is taking her medications regularly amlodipine 5 mg and spironolactone 2 coronary artery disease include obesity. Past treatments include lifestyle changes, diuretics and calcium channel blockers. The current treatment provides significant improvement. Review of Systems   HENT: Negative for ear pain.     Eyes: Negative for AND VOMITING  Sulfasalazine           PAIN   PHYSICAL EXAM:   Physical Exam  Vitals and nursing note reviewed. Constitutional:       General: She is not in acute distress. Appearance: She is well-developed.       Comments: Obese    HENT:      Head: No time      hypelripidemia  · Keep low saturated fat  diet   · Avoid red meat and fast/fried food  · fruits and vegetables   · Keep active /walking ,exercise as  tolerated  · Reach ideal weight   · With both medications present management   · Started  Rosuva

## 2021-08-12 ENCOUNTER — HOSPITAL ENCOUNTER (OUTPATIENT)
Dept: ULTRASOUND IMAGING | Facility: HOSPITAL | Age: 41
Discharge: HOME | End: 2021-08-12
Attending: INTERNAL MEDICINE
Payer: COMMERCIAL

## 2021-08-12 ENCOUNTER — HOSPITAL ENCOUNTER (OUTPATIENT)
Dept: MAMMOGRAPHY | Facility: HOSPITAL | Age: 41
Discharge: HOME | End: 2021-08-12
Attending: INTERNAL MEDICINE
Payer: COMMERCIAL

## 2021-08-12 DIAGNOSIS — R92.8 ABNORMAL MAMMOGRAM OF RIGHT BREAST: ICD-10-CM

## 2021-08-12 DIAGNOSIS — R92.2 INCONCLUSIVE MAMMOGRAM: ICD-10-CM

## 2021-08-12 PROCEDURE — 77065 DX MAMMO INCL CAD UNI: CPT | Performed by: INTERNAL MEDICINE

## 2021-08-12 PROCEDURE — 76642 ULTRASOUND BREAST LIMITED: CPT | Performed by: INTERNAL MEDICINE

## 2021-08-12 PROCEDURE — 77061 BREAST TOMOSYNTHESIS UNI: CPT | Performed by: INTERNAL MEDICINE

## 2021-08-16 ENCOUNTER — TELEPHONE (OUTPATIENT)
Dept: INTERNAL MEDICINE CLINIC | Facility: CLINIC | Age: 41
End: 2021-08-16

## 2021-08-16 NOTE — TELEPHONE ENCOUNTER
Yes , This is a screening mammogram patient and does not to be done exactly  12 months especially -pt  just had a  Dg mammogram r breast  completed recently    I recommend patient to have the Covid vaccine first  And sec vaccine and and then screening  Sarah

## 2021-08-16 NOTE — TELEPHONE ENCOUNTER
Pt calling for Mammogram results. Pt informed of Mammogram results and recommendations. Pt verbalized understanding.   Pt states she will need a letter to her employer stating she needs to hold off on Covid-19 vaccine until after she is done with the regul

## 2021-08-16 NOTE — TELEPHONE ENCOUNTER
Patient needs to complete mammogram in 12 months COVID-19      Vaccine should be avoided within 4 to 6 weeks before the mammogram .      So that letter does not apply for patient request

## 2021-08-16 NOTE — TELEPHONE ENCOUNTER
Dr. Braulio Boateng, per result notes below, Pt is due for regular screening mammogram in 1 month. Is is correct?

## 2021-08-20 ENCOUNTER — IMMUNIZATION (OUTPATIENT)
Dept: LAB | Facility: HOSPITAL | Age: 41
End: 2021-08-20
Attending: EMERGENCY MEDICINE
Payer: COMMERCIAL

## 2021-08-20 DIAGNOSIS — Z23 NEED FOR VACCINATION: Primary | ICD-10-CM

## 2021-08-20 PROCEDURE — 0001A SARSCOV2 VAC 30MCG/0.3ML IM: CPT

## 2021-08-23 ENCOUNTER — OFFICE VISIT (OUTPATIENT)
Dept: DERMATOLOGY CLINIC | Facility: CLINIC | Age: 41
End: 2021-08-23
Payer: COMMERCIAL

## 2021-08-23 DIAGNOSIS — L82.1 DERMATOSIS PAPULOSA NIGRA: ICD-10-CM

## 2021-08-23 DIAGNOSIS — D48.5 NEOPLASM OF UNCERTAIN BEHAVIOR OF SKIN: Primary | ICD-10-CM

## 2021-08-23 DIAGNOSIS — L98.9 PAINFUL SKIN LESION: ICD-10-CM

## 2021-08-23 DIAGNOSIS — R22.2 MASS OF CHEST WALL: ICD-10-CM

## 2021-08-23 DIAGNOSIS — D23.5 BENIGN NEOPLASM OF SKIN OF TRUNK, EXCEPT SCROTUM: ICD-10-CM

## 2021-08-23 DIAGNOSIS — L82.1 SEBORRHEIC KERATOSES: ICD-10-CM

## 2021-08-23 PROCEDURE — 99213 OFFICE O/P EST LOW 20 MIN: CPT | Performed by: DERMATOLOGY

## 2021-08-30 ENCOUNTER — OFFICE VISIT (OUTPATIENT)
Dept: SURGERY | Facility: CLINIC | Age: 41
End: 2021-08-30
Payer: COMMERCIAL

## 2021-08-30 DIAGNOSIS — L72.0 EPIDERMAL INCLUSION CYST: Primary | ICD-10-CM

## 2021-08-30 PROCEDURE — 99204 OFFICE O/P NEW MOD 45 MIN: CPT | Performed by: SURGERY

## 2021-08-30 NOTE — H&P
History and Physical      Thierno Cesar is a 35year old female. HPI   Patient presents with:  Cyst: Pt referred by Dr. Jono Tiwari regarding cyst RUQ x 11 mos. Pt states area was bx but now is causing pain.       HPI  Sees derm regularly, h/o skin bx and daily.     • loratadine (CLARITIN) 10 MG Oral Tab Take 10 mg by mouth daily.        ALLERGIES    Azithromycin            NAUSEA AND VOMITING  Ciprofloxacin           NAUSEA AND VOMITING  Sulfasalazine           PAIN    Social History    Socioeconomic Histor reflexes and motor skills appropriate for age   Skin: axilla and inguinal areas normal, 2-3cm skin cyst in RUQ abd wall, firm, mobile, nt, dry.      DIAGNOSTICS    ASSESSMENT/PLAN   Assessment   Epidermal inclusion cyst  (primary encounter diagnosis)    41

## 2021-08-30 NOTE — PATIENT INSTRUCTIONS
Understanding Epidermoid Cyst     An epidermoid cyst is a small abnormal growth in the top layers of the skin. It's filled with keratin, the same proteins that make up your hair and nails. These cysts grow slowly. They can grow anywhere on the body.  But that don’t get better, or get worse  · New symptoms  Mario last reviewed this educational content on 6/1/2019  © 9147-8973 [employer]. All rights reserved. This information is not intended as a substitute for professional medical care.  Al

## 2021-09-06 NOTE — PROGRESS NOTES
Blade Daily is a 35year old female. Patient presents with:  Lesion: LOV 10/28/2020. Pt requesting treatment or referral for cyst trunk. Tender, sore that worsens throughout (5/10). Pt has had cyst for 8 months.  Of note, recently recieved results Never Smoker      Smokeless tobacco: Never Used    Vaping Use      Vaping Use: Never used    Alcohol use: No      Alcohol/week: 0.0 standard drinks    Drug use:  No                Current Outpatient Medications   Medication Sig Dispense Refill   • SPIRONOLA Spouse name: Not on file      Number of children: 0      Years of education: Not on file      Highest education level: Not on file    Occupational History      Occupation:     Tobacco Use      Smoking status: Never Smoker      Smokeless to [employer] Services:       Active Member of Clubs or Organizations:       Attends Club or Organization Meetings:       Marital Status:   Intimate Partner Violence:       Fear of Current or Ex-Partner:       Emotionally Abused:       Physically Abused:       nigra noted over the cheeks. Numerous waxy tan papules over the face. More flesh-colored papule dome-shaped at right nasal ala 2 mm, dermal papular nodule right pretibial leg consistent with dermatofibroma.   Subcutaneous nodule right supra knee most crump Instructions reviewed at length. General skin care questions answered. Reassurance regarding benign skin lesions. Signs and symptoms of skin cancer, ABCDE's of melanoma briefly reviewed. Sunscreen use, sun protection, encouraged.   Followup as noted i

## 2021-09-10 ENCOUNTER — IMMUNIZATION (OUTPATIENT)
Dept: LAB | Facility: HOSPITAL | Age: 41
End: 2021-09-10
Attending: EMERGENCY MEDICINE
Payer: COMMERCIAL

## 2021-09-10 DIAGNOSIS — Z23 NEED FOR VACCINATION: Primary | ICD-10-CM

## 2021-09-10 PROCEDURE — 0002A SARSCOV2 VAC 30MCG/0.3ML IM: CPT

## 2021-09-16 ENCOUNTER — TELEPHONE (OUTPATIENT)
Dept: SURGERY | Facility: CLINIC | Age: 41
End: 2021-09-16

## 2021-09-16 DIAGNOSIS — L72.3 SEBACEOUS CYST: Primary | ICD-10-CM

## 2021-09-20 ENCOUNTER — LAB REQUISITION (OUTPATIENT)
Dept: SURGERY | Age: 41
End: 2021-09-20
Payer: COMMERCIAL

## 2021-09-20 DIAGNOSIS — Z01.818 PREOP EXAMINATION: ICD-10-CM

## 2021-09-22 LAB — SARS-COV-2 RNA RESP QL NAA+PROBE: NOT DETECTED

## 2021-09-30 ENCOUNTER — TELEPHONE (OUTPATIENT)
Dept: SURGERY | Facility: CLINIC | Age: 41
End: 2021-09-30

## 2021-10-11 ENCOUNTER — OFFICE VISIT (OUTPATIENT)
Dept: SURGERY | Facility: CLINIC | Age: 41
End: 2021-10-11
Payer: COMMERCIAL

## 2021-10-11 ENCOUNTER — OFFICE VISIT (OUTPATIENT)
Dept: INTERNAL MEDICINE CLINIC | Facility: CLINIC | Age: 41
End: 2021-10-11
Payer: COMMERCIAL

## 2021-10-11 VITALS
BODY MASS INDEX: 42.51 KG/M2 | HEIGHT: 64 IN | HEART RATE: 99 BPM | DIASTOLIC BLOOD PRESSURE: 83 MMHG | WEIGHT: 249 LBS | SYSTOLIC BLOOD PRESSURE: 129 MMHG

## 2021-10-11 DIAGNOSIS — R22.2 LOCALIZED SWELLING, MASS AND LUMP, TRUNK: Primary | ICD-10-CM

## 2021-10-11 DIAGNOSIS — K21.9 GASTROESOPHAGEAL REFLUX DISEASE WITHOUT ESOPHAGITIS: ICD-10-CM

## 2021-10-11 DIAGNOSIS — Z30.42 ENCOUNTER FOR DEPO-PROVERA CONTRACEPTION: ICD-10-CM

## 2021-10-11 DIAGNOSIS — E78.00 PURE HYPERCHOLESTEROLEMIA: ICD-10-CM

## 2021-10-11 DIAGNOSIS — I10 PRIMARY HYPERTENSION: Primary | ICD-10-CM

## 2021-10-11 DIAGNOSIS — L72.9 SKIN CYST: ICD-10-CM

## 2021-10-11 PROCEDURE — 99024 POSTOP FOLLOW-UP VISIT: CPT | Performed by: SURGERY

## 2021-10-11 PROCEDURE — 3008F BODY MASS INDEX DOCD: CPT | Performed by: INTERNAL MEDICINE

## 2021-10-11 PROCEDURE — 99214 OFFICE O/P EST MOD 30 MIN: CPT | Performed by: INTERNAL MEDICINE

## 2021-10-11 PROCEDURE — 96372 THER/PROPH/DIAG INJ SC/IM: CPT | Performed by: INTERNAL MEDICINE

## 2021-10-11 PROCEDURE — 3074F SYST BP LT 130 MM HG: CPT | Performed by: INTERNAL MEDICINE

## 2021-10-11 PROCEDURE — 81025 URINE PREGNANCY TEST: CPT | Performed by: INTERNAL MEDICINE

## 2021-10-11 PROCEDURE — 3079F DIAST BP 80-89 MM HG: CPT | Performed by: INTERNAL MEDICINE

## 2021-10-11 RX ADMIN — MEDROXYPROGESTERONE ACETATE 150 MG: 150 INJECTION, SUSPENSION INTRAMUSCULAR at 13:05:00

## 2021-10-11 NOTE — PROGRESS NOTES
HPI:    Patient ID: Ness Lindsey is a 35year old female. Patient presents with    Patient presents with: Follow - Up: F/u on cysts.   Stts she had the cyst removed by Dr. Rossy Wolff on 9/23/21        Patient presents today for   HTN , patient had a cyst re lifestyle changes, diuretics and calcium channel blockers. The current treatment provides significant improvement. Review of Systems   HENT: Negative for ear pain. Eyes: Negative for pain and redness.    Respiratory: Negative for shortness of breat VOMITING  Sulfasalazine           PAIN   PHYSICAL EXAM:   Physical Exam  Vitals and nursing note reviewed. Constitutional:       General: She is not in acute distress. Appearance: She is well-developed.       Comments: Obese    HENT:      Head: Normoc fast/fried food  · fruits and vegetables   · Keep active /walking ,exercise as  tolerated  · Reach ideal weight   · With both medications present management   RE Started  Rosuvastatin 5 mg patient states she did not take the rosuvastatin due to nausea champ

## 2021-10-12 ENCOUNTER — TELEPHONE (OUTPATIENT)
Dept: SURGERY | Facility: CLINIC | Age: 41
End: 2021-10-12

## 2021-10-12 NOTE — TELEPHONE ENCOUNTER
Per Dr. 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 His question is if the patient has any enlarged lymph nodes.   Dr. Rachid Arteaga like to send the specimen to [facility] and needs Dr. Zac Vergara to sign o

## 2021-10-12 NOTE — PROGRESS NOTES
Postoperative Patient Follow-up      10/11/2021    Ger Hernandez 35year old      HPI  Patient presents with:  Post-Op: first post-op visit, s/p excisional biopsy right lateral abdominal wall skin neoplasm/cyst (3.1 cm diameter) 2.  Layered closure (4.6cm

## 2021-10-13 ENCOUNTER — TELEPHONE (OUTPATIENT)
Dept: SURGERY | Facility: CLINIC | Age: 41
End: 2021-10-13

## 2021-10-13 NOTE — TELEPHONE ENCOUNTER
Spoke with patient advising her the path were sent to the [facility].

## 2021-10-20 ENCOUNTER — TELEPHONE (OUTPATIENT)
Dept: SURGERY | Facility: CLINIC | Age: 41
End: 2021-10-20

## 2021-10-20 NOTE — TELEPHONE ENCOUNTER
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 She continues to slowly improve after the biopsy, some chronic shoulder pain and occ breast pain. No obvious enlarged lymph nodes.   Recommend Hematology eval, for baseline LN assessment and sero

## 2021-10-21 ENCOUNTER — TELEPHONE (OUTPATIENT)
Dept: INTERNAL MEDICINE CLINIC | Facility: CLINIC | Age: 41
End: 2021-10-21

## 2021-10-21 ENCOUNTER — TELEPHONE (OUTPATIENT)
Dept: SURGERY | Facility: CLINIC | Age: 41
End: 2021-10-21

## 2021-10-21 DIAGNOSIS — D76.3 ROSAI-DORFMAN DISEASE (HCC): Primary | ICD-10-CM

## 2021-10-21 RX ORDER — AMLODIPINE BESYLATE 5 MG/1
5 TABLET ORAL DAILY
Qty: 90 TABLET | Refills: 1 | Status: SHIPPED | OUTPATIENT
Start: 2021-10-21 | End: 2022-04-06

## 2021-10-21 NOTE — TELEPHONE ENCOUNTER
Patient states Dr. Sedrick Griffith is requesting her to get a referral to see Dr. 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

## 2021-10-21 NOTE — TELEPHONE ENCOUNTER
Refill passed per [employer] [employer] protocol.      Requested Prescriptions   Pending Prescriptions Disp Refills    AMLODIPINE 5 MG Oral Tab [Pharmacy Med Name: AMLODIPINE BESYLATE 5MG TABLETS] 90 tablet 0     Sig: TAKE 1 TABLET(5 MG) BY MOUTH DAILY        Hypertensive Medications Protocol Passed - 10/20/2021  8:51 PM        Passed - CMP or BMP in past 12 months        Passed - Appointment in past 6 or next 3 months        Passed - GFR  > 50     Lab Results   Component Value Date    GFRAA 84 07/14/2021                         Recent Outpatient Visits              1 week ago Primary hypertension    [facility], [address] Selt[place]es, [place]    Office Visit    1 week ago Localized swelling, mass and lump, trunk    [facility] Kip Dandy[facility]    Office Visit    1 month ago Epidermal inclusion cyst    [facility] K[facility] D[facility]    Office Visit    1 month ago Neoplasm of uncertain behavior of skin    [facility] Raj Chua MD    Office Visit    2 months ago Primary hypertension    [address] Seltjarnarnes, MD    Office Visit

## 2021-11-02 ENCOUNTER — OFFICE VISIT (OUTPATIENT)
Dept: HEMATOLOGY/ONCOLOGY | Facility: HOSPITAL | Age: 41
End: 2021-11-02
Attending: INTERNAL MEDICINE
Payer: COMMERCIAL

## 2021-11-02 VITALS
RESPIRATION RATE: 16 BRPM | DIASTOLIC BLOOD PRESSURE: 87 MMHG | HEIGHT: 64 IN | BODY MASS INDEX: 42.68 KG/M2 | TEMPERATURE: 99 F | HEART RATE: 104 BPM | SYSTOLIC BLOOD PRESSURE: 155 MMHG | OXYGEN SATURATION: 100 % | WEIGHT: 250 LBS

## 2021-11-02 DIAGNOSIS — D76.3 ROSAI-DORFMAN DISEASE (HCC): ICD-10-CM

## 2021-11-02 DIAGNOSIS — D76.3 ROSAI-DORFMAN DISEASE (HCC): Primary | ICD-10-CM

## 2021-11-02 PROCEDURE — 85652 RBC SED RATE AUTOMATED: CPT

## 2021-11-02 PROCEDURE — 84165 PROTEIN E-PHORESIS SERUM: CPT

## 2021-11-02 PROCEDURE — 99204 OFFICE O/P NEW MOD 45 MIN: CPT | Performed by: INTERNAL MEDICINE

## 2021-11-02 PROCEDURE — 85025 COMPLETE CBC W/AUTO DIFF WBC: CPT

## 2021-11-02 PROCEDURE — 36415 COLL VENOUS BLD VENIPUNCTURE: CPT

## 2021-11-02 PROCEDURE — 80053 COMPREHEN METABOLIC PANEL: CPT

## 2021-11-02 NOTE — CONSULTS
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   She noted diaphoresis (every other night, states has to change her sheets, states feels hot and has to go outside to cool off, but mostly sweat. ), fatigue (\"always tired, not new\") and unexpected weight change (recently gained 7 lbs). Negative for fever.    HENT: (VITAMIN C ADULT GUMMIES OR) Take 2 tablets by mouth daily. • Vitamin B-12 1000 MCG Oral Tab Take 1 tablet by mouth every other day      • DiphenhydrAMINE HCl (BENADRYL ALLERGY OR) Take by mouth daily as needed.      • Multiple Vitamins-Minerals (Karen Domínguez auscultation. Heart: Regular rate and rhythm. Abdomen: Soft, non tender with good bowel sounds. Extremities: No edema. Neurological: Grossly intact. Lymphatics:  There is no palpable lymphadenopathy throughout in the cervical, supraclavicular, axilla DIFFERENTIAL WITH PLATELET; Future  - COMP METABOLIC PANEL (14); Future  - SED RATE, WESTERGREN (AUTOMATED); Future  - SERUM PROTEIN ELECT W/ REFLEX; Future  - CT STN/CHEST/ABDOMEN/PELVIS (ALL W+WO) (CPT=70492/57467/01164);  Future    No symptoms related to

## 2021-11-10 ENCOUNTER — TELEPHONE (OUTPATIENT)
Dept: HEMATOLOGY/ONCOLOGY | Facility: HOSPITAL | Age: 41
End: 2021-11-10

## 2021-11-10 NOTE — TELEPHONE ENCOUNTER
Patient called per Dr. Huey Minaya request. Solange Felt to assist with scheduling CT scan and follow up with Dr. Melissa Sullivan. Per patient will self schedule due to has to check schedule. Patient verbalizes understanding.

## 2021-12-17 ENCOUNTER — APPOINTMENT (OUTPATIENT)
Dept: HEMATOLOGY/ONCOLOGY | Facility: HOSPITAL | Age: 41
End: 2021-12-17
Attending: INTERNAL MEDICINE
Payer: COMMERCIAL

## 2022-01-07 ENCOUNTER — NURSE ONLY (OUTPATIENT)
Dept: INTERNAL MEDICINE CLINIC | Facility: CLINIC | Age: 42
End: 2022-01-07
Payer: COMMERCIAL

## 2022-01-07 DIAGNOSIS — Z30.42 ON DEPO MEDROXYPROGESTERONE ACETATE FOR CONTRACEPTION: Primary | ICD-10-CM

## 2022-01-07 PROCEDURE — 96372 THER/PROPH/DIAG INJ SC/IM: CPT | Performed by: INTERNAL MEDICINE

## 2022-01-07 RX ADMIN — MEDROXYPROGESTERONE ACETATE 150 MG: 150 INJECTION, SUSPENSION INTRAMUSCULAR at 10:28:00

## 2022-01-12 DIAGNOSIS — I10 ESSENTIAL HYPERTENSION: ICD-10-CM

## 2022-01-12 RX ORDER — SPIRONOLACTONE 25 MG/1
25 TABLET ORAL DAILY
Qty: 90 TABLET | Refills: 1 | Status: SHIPPED | OUTPATIENT
Start: 2022-01-12 | End: 2022-04-06

## 2022-01-12 NOTE — TELEPHONE ENCOUNTER
Refill passed per [employer] protocol.     Requested Prescriptions   Pending Prescriptions Disp Refills    SPIRONOLACTONE 25 MG Oral Tab [Pharmacy Med Name: SPIRONOLACTONE 25MG TABLETS] 90 tablet 1     Sig: TAKE 1 TABLET(25 MG) BY MOUTH DAILY        Hypertensive Medications Protocol Passed - 1/12/2022  8:13 AM        Passed - CMP or BMP in past 12 months        Passed - Appointment in past 6 or next 3 months        Passed - GFR  > 50     Lab Results   Component Value Date    GFRAA 92 11/02/2021                       Recent Outpatient Visits              5 days ago On depo medroxyprogesterone acetate for contraception    [facility] [place], [address]    Nurse Only    2 months ago Rosai-Dami disease [facility])    [facility]    Nurse Only    2 months ago [facility])    [facility] Michelle Whiting MD    Office Visit    3 months ago Primary hypertension    [facility], [address], [place], Seltjarnarnes, MD    Office Visit    3 months ago Localized swelling, mass and lump, trunk    [facility] Enoch Rowan MD    Office Visit

## 2022-03-10 ENCOUNTER — IMMUNIZATION (OUTPATIENT)
Dept: LAB | Age: 42
End: 2022-03-10
Attending: EMERGENCY MEDICINE
Payer: COMMERCIAL

## 2022-03-10 DIAGNOSIS — Z23 NEED FOR VACCINATION: Primary | ICD-10-CM

## 2022-03-10 PROCEDURE — 0054A SARSCOV2 VAC 30MCG TRS SUCR: CPT

## 2022-03-16 ENCOUNTER — TELEPHONE (OUTPATIENT)
Dept: INTERNAL MEDICINE CLINIC | Facility: CLINIC | Age: 42
End: 2022-03-16

## 2022-03-16 NOTE — TELEPHONE ENCOUNTER
Approved Depo provera  shot with the Nurse     please see the encounter- notes from 1/ 7 /22   )  Due date for Depo inj.from 3/ 25-4/822-See notes below  Patient can schedule appointment with the nurse      1/7/22  Pt came in for Depo inj. Pt to return on 03/25-04/08 pregnancy test was negative in office.

## 2022-03-29 ENCOUNTER — NURSE ONLY (OUTPATIENT)
Dept: INTERNAL MEDICINE CLINIC | Facility: CLINIC | Age: 42
End: 2022-03-29
Payer: COMMERCIAL

## 2022-03-29 DIAGNOSIS — Z30.42 ENCOUNTER FOR DEPO-PROVERA CONTRACEPTION: Primary | ICD-10-CM

## 2022-03-29 LAB
CONTROL LINE PRESENT WITH A CLEAR BACKGROUND (YES/NO): YES YES/NO
PREGNANCY TEST, URINE: NEGATIVE

## 2022-03-29 PROCEDURE — 81025 URINE PREGNANCY TEST: CPT | Performed by: INTERNAL MEDICINE

## 2022-03-29 PROCEDURE — 96372 THER/PROPH/DIAG INJ SC/IM: CPT | Performed by: INTERNAL MEDICINE

## 2022-03-29 RX ADMIN — MEDROXYPROGESTERONE ACETATE 150 MG: 150 INJECTION, SUSPENSION INTRAMUSCULAR at 11:15:00

## 2022-03-29 NOTE — PROGRESS NOTES
Pt arrived to the office to receive her Depo-Provera injection. Pt verified name and . Pregnancy test was negative. Injection given in pts left deltoid with no reactions. Next depo-provera window is  thru . Dates given to pt.

## 2022-04-06 ENCOUNTER — OFFICE VISIT (OUTPATIENT)
Dept: FAMILY MEDICINE CLINIC | Facility: CLINIC | Age: 42
End: 2022-04-06
Payer: COMMERCIAL

## 2022-04-06 VITALS
BODY MASS INDEX: 41.66 KG/M2 | HEIGHT: 64 IN | SYSTOLIC BLOOD PRESSURE: 134 MMHG | HEART RATE: 105 BPM | DIASTOLIC BLOOD PRESSURE: 84 MMHG | WEIGHT: 244 LBS

## 2022-04-06 DIAGNOSIS — Z01.419 ENCOUNTER FOR ROUTINE GYNECOLOGICAL EXAMINATION WITH PAPANICOLAOU SMEAR OF CERVIX: ICD-10-CM

## 2022-04-06 DIAGNOSIS — E66.01 MORBIDLY OBESE (HCC): ICD-10-CM

## 2022-04-06 DIAGNOSIS — E78.2 MIXED HYPERLIPIDEMIA: ICD-10-CM

## 2022-04-06 DIAGNOSIS — K21.9 GASTROESOPHAGEAL REFLUX DISEASE WITHOUT ESOPHAGITIS: ICD-10-CM

## 2022-04-06 DIAGNOSIS — I10 ESSENTIAL HYPERTENSION: ICD-10-CM

## 2022-04-06 DIAGNOSIS — Z00.00 ROUTINE GENERAL MEDICAL EXAMINATION AT A HEALTH CARE FACILITY: Primary | ICD-10-CM

## 2022-04-06 PROCEDURE — 3079F DIAST BP 80-89 MM HG: CPT | Performed by: PHYSICIAN ASSISTANT

## 2022-04-06 PROCEDURE — 99386 PREV VISIT NEW AGE 40-64: CPT | Performed by: PHYSICIAN ASSISTANT

## 2022-04-06 PROCEDURE — 3075F SYST BP GE 130 - 139MM HG: CPT | Performed by: PHYSICIAN ASSISTANT

## 2022-04-06 PROCEDURE — 3008F BODY MASS INDEX DOCD: CPT | Performed by: PHYSICIAN ASSISTANT

## 2022-04-06 RX ORDER — SPIRONOLACTONE 25 MG/1
25 TABLET ORAL DAILY
Qty: 90 TABLET | Refills: 1 | Status: SHIPPED | OUTPATIENT
Start: 2022-04-06

## 2022-04-06 RX ORDER — OMEPRAZOLE 40 MG/1
40 CAPSULE, DELAYED RELEASE ORAL 2 TIMES DAILY
Qty: 180 CAPSULE | Refills: 1 | Status: SHIPPED | OUTPATIENT
Start: 2022-04-06

## 2022-04-06 RX ORDER — AMLODIPINE BESYLATE 5 MG/1
5 TABLET ORAL DAILY
Qty: 90 TABLET | Refills: 1 | Status: SHIPPED | OUTPATIENT
Start: 2022-04-06

## 2022-04-09 LAB — HPV MRNA E6/E7: NOT DETECTED

## 2022-04-16 ENCOUNTER — OFFICE VISIT (OUTPATIENT)
Dept: FAMILY MEDICINE CLINIC | Facility: CLINIC | Age: 42
End: 2022-04-16
Payer: COMMERCIAL

## 2022-04-16 VITALS
HEART RATE: 94 BPM | WEIGHT: 240 LBS | BODY MASS INDEX: 42.52 KG/M2 | RESPIRATION RATE: 18 BRPM | TEMPERATURE: 99 F | HEIGHT: 63 IN | OXYGEN SATURATION: 100 % | SYSTOLIC BLOOD PRESSURE: 146 MMHG | DIASTOLIC BLOOD PRESSURE: 74 MMHG

## 2022-04-16 DIAGNOSIS — H00.011 HORDEOLUM EXTERNUM OF RIGHT UPPER EYELID: Primary | ICD-10-CM

## 2022-04-16 PROCEDURE — 3008F BODY MASS INDEX DOCD: CPT | Performed by: NURSE PRACTITIONER

## 2022-04-16 PROCEDURE — 3077F SYST BP >= 140 MM HG: CPT | Performed by: NURSE PRACTITIONER

## 2022-04-16 PROCEDURE — 99212 OFFICE O/P EST SF 10 MIN: CPT | Performed by: NURSE PRACTITIONER

## 2022-04-16 PROCEDURE — 3078F DIAST BP <80 MM HG: CPT | Performed by: NURSE PRACTITIONER

## 2022-04-16 RX ORDER — ERYTHROMYCIN 5 MG/G
1 OINTMENT OPHTHALMIC EVERY 6 HOURS
Qty: 1 EACH | Refills: 0 | Status: SHIPPED | OUTPATIENT
Start: 2022-04-16 | End: 2022-04-23

## 2022-04-18 ENCOUNTER — OFFICE VISIT (OUTPATIENT)
Dept: INTERNAL MEDICINE CLINIC | Facility: CLINIC | Age: 42
End: 2022-04-18
Payer: COMMERCIAL

## 2022-04-18 VITALS
SYSTOLIC BLOOD PRESSURE: 148 MMHG | TEMPERATURE: 99 F | DIASTOLIC BLOOD PRESSURE: 82 MMHG | BODY MASS INDEX: 43.23 KG/M2 | WEIGHT: 244 LBS | HEART RATE: 103 BPM | HEIGHT: 63 IN

## 2022-04-18 DIAGNOSIS — H00.011 HORDEOLUM EXTERNUM OF RIGHT UPPER EYELID: Primary | ICD-10-CM

## 2022-04-18 PROCEDURE — 3077F SYST BP >= 140 MM HG: CPT | Performed by: NURSE PRACTITIONER

## 2022-04-18 PROCEDURE — 99213 OFFICE O/P EST LOW 20 MIN: CPT | Performed by: NURSE PRACTITIONER

## 2022-04-18 PROCEDURE — 3008F BODY MASS INDEX DOCD: CPT | Performed by: NURSE PRACTITIONER

## 2022-04-18 PROCEDURE — 3079F DIAST BP 80-89 MM HG: CPT | Performed by: NURSE PRACTITIONER

## 2022-04-18 RX ORDER — AMOXICILLIN AND CLAVULANATE POTASSIUM 875; 125 MG/1; MG/1
1 TABLET, FILM COATED ORAL 2 TIMES DAILY
Qty: 20 TABLET | Refills: 0 | Status: SHIPPED | OUTPATIENT
Start: 2022-04-18 | End: 2022-04-28

## 2022-04-18 NOTE — ASSESSMENT & PLAN NOTE
Patient Instructions   Stye  -Apply warm moist washcloth compresses for 15 minutes, several times per day. -After compresses, gently scrub eyelids once a day with fingertips or Q-tip using baby shampoo diluted 1:1 with clean water (or cleansing pads such as Eye Scrub or Lid Scrub, but these are more costly)  This may take time for swelling to go down. amoxicillin clavulanate 875-125 MG Oral Tab Take 1 tablet by mouth 2 (two) times daily for 10 days.      If no improvement follow with Ophthalmologist

## 2022-06-07 ENCOUNTER — TELEPHONE (OUTPATIENT)
Dept: INTERNAL MEDICINE CLINIC | Facility: CLINIC | Age: 42
End: 2022-06-07

## 2022-06-07 NOTE — TELEPHONE ENCOUNTER
APPROVED order    - urine pregnancy test always to check before  Depo provera inj. Analgesia was given

## 2022-06-07 NOTE — TELEPHONE ENCOUNTER
Standing order has . Last depo injection was given on 3/29/22, and pap completed on 22. Ok to continue giving depo provera injections, and create a new standing order? Window for next injection is  thru .

## 2022-06-08 NOTE — TELEPHONE ENCOUNTER
Pls call pt to schedule nurse visit for depo provera injection (urine pregnancy test will be preformed before injection). Window for next injection would be 6/14 thru 6/28.

## 2022-06-21 ENCOUNTER — NURSE ONLY (OUTPATIENT)
Dept: INTERNAL MEDICINE CLINIC | Facility: CLINIC | Age: 42
End: 2022-06-21
Payer: COMMERCIAL

## 2022-06-21 DIAGNOSIS — Z30.42 ENCOUNTER FOR DEPO-PROVERA CONTRACEPTION: Primary | ICD-10-CM

## 2022-06-21 LAB
CONTROL LINE PRESENT WITH A CLEAR BACKGROUND (YES/NO): YES YES/NO
PREGNANCY TEST, URINE: NEGATIVE

## 2022-06-21 PROCEDURE — 81025 URINE PREGNANCY TEST: CPT | Performed by: INTERNAL MEDICINE

## 2022-06-21 PROCEDURE — 96372 THER/PROPH/DIAG INJ SC/IM: CPT | Performed by: INTERNAL MEDICINE

## 2022-06-21 RX ADMIN — MEDROXYPROGESTERONE ACETATE 150 MG: 150 INJECTION, SUSPENSION INTRAMUSCULAR at 09:57:00

## 2022-06-21 NOTE — PROGRESS NOTES
Pt arrived for her depo-provera injection. Pt verified name and . UHCG was negative. Injection was given in pts left deltoid with no reactions. Pts next window for her injection is  thru . Dates given to pt during nurse visit.

## 2022-09-12 ENCOUNTER — NURSE ONLY (OUTPATIENT)
Dept: INTERNAL MEDICINE CLINIC | Facility: CLINIC | Age: 42
End: 2022-09-12
Payer: COMMERCIAL

## 2022-09-12 DIAGNOSIS — Z30.42 ENCOUNTER FOR DEPO-PROVERA CONTRACEPTION: Primary | ICD-10-CM

## 2022-09-12 LAB
CONTROL LINE PRESENT WITH A CLEAR BACKGROUND (YES/NO): YES YES/NO
PREGNANCY TEST, URINE: NEGATIVE

## 2022-09-12 PROCEDURE — 96372 THER/PROPH/DIAG INJ SC/IM: CPT | Performed by: INTERNAL MEDICINE

## 2022-09-12 PROCEDURE — 81025 URINE PREGNANCY TEST: CPT | Performed by: INTERNAL MEDICINE

## 2022-09-12 RX ADMIN — MEDROXYPROGESTERONE ACETATE 150 MG: 150 INJECTION, SUSPENSION INTRAMUSCULAR at 09:48:00

## 2022-09-12 NOTE — PROGRESS NOTES
Patient presents to office for Depo-Provera injection, pt is within appropriate time frame for injection. UHCG ws negative. Pt tolerated injection well, no reactions. Patients next window for injection is 11/28-12/12, dates given to patient during appointment.

## 2022-11-06 DIAGNOSIS — I10 ESSENTIAL HYPERTENSION: ICD-10-CM

## 2022-11-07 RX ORDER — AMLODIPINE BESYLATE 5 MG/1
5 TABLET ORAL DAILY
Qty: 90 TABLET | Refills: 0 | Status: SHIPPED | OUTPATIENT
Start: 2022-11-07

## 2022-11-07 RX ORDER — SPIRONOLACTONE 25 MG/1
25 TABLET ORAL DAILY
Qty: 90 TABLET | Refills: 0 | Status: SHIPPED | OUTPATIENT
Start: 2022-11-07

## 2022-12-06 ENCOUNTER — OFFICE VISIT (OUTPATIENT)
Dept: INTERNAL MEDICINE CLINIC | Facility: CLINIC | Age: 42
End: 2022-12-06
Payer: COMMERCIAL

## 2022-12-06 ENCOUNTER — LAB ENCOUNTER (OUTPATIENT)
Dept: LAB | Age: 42
End: 2022-12-06
Attending: INTERNAL MEDICINE
Payer: COMMERCIAL

## 2022-12-06 VITALS
HEIGHT: 63 IN | WEIGHT: 246.56 LBS | HEART RATE: 99 BPM | BODY MASS INDEX: 43.69 KG/M2 | SYSTOLIC BLOOD PRESSURE: 128 MMHG | DIASTOLIC BLOOD PRESSURE: 84 MMHG

## 2022-12-06 DIAGNOSIS — K21.9 GASTROESOPHAGEAL REFLUX DISEASE WITHOUT ESOPHAGITIS: ICD-10-CM

## 2022-12-06 DIAGNOSIS — Z30.8 ENCOUNTER FOR OTHER CONTRACEPTIVE MANAGEMENT: ICD-10-CM

## 2022-12-06 DIAGNOSIS — I10 PRIMARY HYPERTENSION: Primary | ICD-10-CM

## 2022-12-06 DIAGNOSIS — Z12.31 SCREENING MAMMOGRAM, ENCOUNTER FOR: ICD-10-CM

## 2022-12-06 DIAGNOSIS — Z00.00 PHYSICAL EXAM: ICD-10-CM

## 2022-12-06 DIAGNOSIS — Z00.00 PHYSICAL EXAM: Primary | ICD-10-CM

## 2022-12-06 LAB
ALBUMIN SERPL-MCNC: 3.7 G/DL (ref 3.4–5)
ALBUMIN/GLOB SERPL: 1 {RATIO} (ref 1–2)
ALP LIVER SERPL-CCNC: 78 U/L
ALT SERPL-CCNC: 34 U/L
ANION GAP SERPL CALC-SCNC: 7 MMOL/L (ref 0–18)
AST SERPL-CCNC: 13 U/L (ref 15–37)
BASOPHILS # BLD AUTO: 0.06 X10(3) UL (ref 0–0.2)
BASOPHILS NFR BLD AUTO: 1 %
BILIRUB SERPL-MCNC: 0.5 MG/DL (ref 0.1–2)
BUN BLD-MCNC: 10 MG/DL (ref 7–18)
BUN/CREAT SERPL: 10.9 (ref 10–20)
CALCIUM BLD-MCNC: 9.6 MG/DL (ref 8.5–10.1)
CHLORIDE SERPL-SCNC: 106 MMOL/L (ref 98–112)
CHOLEST SERPL-MCNC: 239 MG/DL (ref ?–200)
CO2 SERPL-SCNC: 28 MMOL/L (ref 21–32)
CONTROL LINE PRESENT WITH A CLEAR BACKGROUND (YES/NO): YES YES/NO
CREAT BLD-MCNC: 0.92 MG/DL
DEPRECATED RDW RBC AUTO: 38.1 FL (ref 35.1–46.3)
EOSINOPHIL # BLD AUTO: 0.06 X10(3) UL (ref 0–0.7)
EOSINOPHIL NFR BLD AUTO: 1 %
ERYTHROCYTE [DISTWIDTH] IN BLOOD BY AUTOMATED COUNT: 13.6 % (ref 11–15)
FASTING PATIENT LIPID ANSWER: YES
FASTING STATUS PATIENT QL REPORTED: YES
GFR SERPLBLD BASED ON 1.73 SQ M-ARVRAT: 79 ML/MIN/1.73M2 (ref 60–?)
GLOBULIN PLAS-MCNC: 3.6 G/DL (ref 2.8–4.4)
GLUCOSE BLD-MCNC: 87 MG/DL (ref 70–99)
HCT VFR BLD AUTO: 39 %
HDLC SERPL-MCNC: 54 MG/DL (ref 40–59)
HGB BLD-MCNC: 12 G/DL
IMM GRANULOCYTES # BLD AUTO: 0.01 X10(3) UL (ref 0–1)
IMM GRANULOCYTES NFR BLD: 0.2 %
KIT LOT #: NORMAL NUMERIC
LDLC SERPL CALC-MCNC: 168 MG/DL (ref ?–100)
LYMPHOCYTES # BLD AUTO: 1.8 X10(3) UL (ref 1–4)
LYMPHOCYTES NFR BLD AUTO: 29.3 %
MCH RBC QN AUTO: 23.7 PG (ref 26–34)
MCHC RBC AUTO-ENTMCNC: 30.8 G/DL (ref 31–37)
MCV RBC AUTO: 77.1 FL
MONOCYTES # BLD AUTO: 0.53 X10(3) UL (ref 0.1–1)
MONOCYTES NFR BLD AUTO: 8.6 %
NEUTROPHILS # BLD AUTO: 3.68 X10 (3) UL (ref 1.5–7.7)
NEUTROPHILS # BLD AUTO: 3.68 X10(3) UL (ref 1.5–7.7)
NEUTROPHILS NFR BLD AUTO: 59.9 %
NONHDLC SERPL-MCNC: 185 MG/DL (ref ?–130)
OSMOLALITY SERPL CALC.SUM OF ELEC: 290 MOSM/KG (ref 275–295)
PLATELET # BLD AUTO: 303 10(3)UL (ref 150–450)
POTASSIUM SERPL-SCNC: 4.2 MMOL/L (ref 3.5–5.1)
PREGNANCY TEST, URINE: NEGATIVE
PROT SERPL-MCNC: 7.3 G/DL (ref 6.4–8.2)
RBC # BLD AUTO: 5.06 X10(6)UL
SODIUM SERPL-SCNC: 141 MMOL/L (ref 136–145)
TRIGL SERPL-MCNC: 97 MG/DL (ref 30–149)
TSI SER-ACNC: 1.51 MIU/ML (ref 0.36–3.74)
VLDLC SERPL CALC-MCNC: 19 MG/DL (ref 0–30)
WBC # BLD AUTO: 6.1 X10(3) UL (ref 4–11)

## 2022-12-06 PROCEDURE — 81025 URINE PREGNANCY TEST: CPT | Performed by: INTERNAL MEDICINE

## 2022-12-06 PROCEDURE — 96372 THER/PROPH/DIAG INJ SC/IM: CPT | Performed by: INTERNAL MEDICINE

## 2022-12-06 PROCEDURE — 36415 COLL VENOUS BLD VENIPUNCTURE: CPT | Performed by: INTERNAL MEDICINE

## 2022-12-06 PROCEDURE — 3008F BODY MASS INDEX DOCD: CPT | Performed by: INTERNAL MEDICINE

## 2022-12-06 PROCEDURE — 3079F DIAST BP 80-89 MM HG: CPT | Performed by: INTERNAL MEDICINE

## 2022-12-06 PROCEDURE — 84443 ASSAY THYROID STIM HORMONE: CPT | Performed by: INTERNAL MEDICINE

## 2022-12-06 PROCEDURE — 3074F SYST BP LT 130 MM HG: CPT | Performed by: INTERNAL MEDICINE

## 2022-12-06 PROCEDURE — 80053 COMPREHEN METABOLIC PANEL: CPT | Performed by: INTERNAL MEDICINE

## 2022-12-06 PROCEDURE — 85025 COMPLETE CBC W/AUTO DIFF WBC: CPT | Performed by: INTERNAL MEDICINE

## 2022-12-06 PROCEDURE — 99214 OFFICE O/P EST MOD 30 MIN: CPT | Performed by: INTERNAL MEDICINE

## 2022-12-06 PROCEDURE — 80061 LIPID PANEL: CPT | Performed by: INTERNAL MEDICINE

## 2022-12-06 RX ADMIN — MEDROXYPROGESTERONE ACETATE 150 MG: 150 INJECTION, SUSPENSION INTRAMUSCULAR at 09:58:00

## 2022-12-15 ENCOUNTER — OFFICE VISIT (OUTPATIENT)
Dept: FAMILY MEDICINE CLINIC | Facility: CLINIC | Age: 42
End: 2022-12-15
Payer: COMMERCIAL

## 2022-12-15 VITALS
HEART RATE: 108 BPM | HEIGHT: 64 IN | BODY MASS INDEX: 42 KG/M2 | SYSTOLIC BLOOD PRESSURE: 149 MMHG | RESPIRATION RATE: 18 BRPM | OXYGEN SATURATION: 97 % | TEMPERATURE: 98 F | WEIGHT: 246 LBS | DIASTOLIC BLOOD PRESSURE: 96 MMHG

## 2022-12-15 DIAGNOSIS — H65.92 LEFT NON-SUPPURATIVE OTITIS MEDIA: Primary | ICD-10-CM

## 2022-12-15 PROCEDURE — 99213 OFFICE O/P EST LOW 20 MIN: CPT | Performed by: NURSE PRACTITIONER

## 2022-12-15 PROCEDURE — 3077F SYST BP >= 140 MM HG: CPT | Performed by: NURSE PRACTITIONER

## 2022-12-15 PROCEDURE — 3008F BODY MASS INDEX DOCD: CPT | Performed by: NURSE PRACTITIONER

## 2022-12-15 PROCEDURE — 3080F DIAST BP >= 90 MM HG: CPT | Performed by: NURSE PRACTITIONER

## 2022-12-15 RX ORDER — AMOXICILLIN 875 MG/1
875 TABLET, COATED ORAL 2 TIMES DAILY
Qty: 20 TABLET | Refills: 0 | Status: SHIPPED | OUTPATIENT
Start: 2022-12-15 | End: 2022-12-25

## 2023-03-01 ENCOUNTER — OFFICE VISIT (OUTPATIENT)
Dept: INTERNAL MEDICINE CLINIC | Facility: CLINIC | Age: 43
End: 2023-03-01

## 2023-03-01 VITALS
DIASTOLIC BLOOD PRESSURE: 84 MMHG | BODY MASS INDEX: 42.68 KG/M2 | WEIGHT: 250 LBS | HEART RATE: 89 BPM | HEIGHT: 64 IN | SYSTOLIC BLOOD PRESSURE: 130 MMHG

## 2023-03-01 DIAGNOSIS — E78.00 PURE HYPERCHOLESTEROLEMIA: ICD-10-CM

## 2023-03-01 DIAGNOSIS — Z30.42 ENCOUNTER FOR DEPO-PROVERA CONTRACEPTION: ICD-10-CM

## 2023-03-01 DIAGNOSIS — I10 PRIMARY HYPERTENSION: Primary | ICD-10-CM

## 2023-03-01 DIAGNOSIS — K21.9 GASTROESOPHAGEAL REFLUX DISEASE WITHOUT ESOPHAGITIS: ICD-10-CM

## 2023-03-01 LAB
CONTROL LINE PRESENT WITH A CLEAR BACKGROUND (YES/NO): YES YES/NO
PREGNANCY TEST, URINE: NEGATIVE

## 2023-03-01 PROCEDURE — 3008F BODY MASS INDEX DOCD: CPT | Performed by: INTERNAL MEDICINE

## 2023-03-01 PROCEDURE — 3079F DIAST BP 80-89 MM HG: CPT | Performed by: INTERNAL MEDICINE

## 2023-03-01 PROCEDURE — 3075F SYST BP GE 130 - 139MM HG: CPT | Performed by: INTERNAL MEDICINE

## 2023-03-01 PROCEDURE — 81025 URINE PREGNANCY TEST: CPT | Performed by: INTERNAL MEDICINE

## 2023-03-01 PROCEDURE — 99214 OFFICE O/P EST MOD 30 MIN: CPT | Performed by: INTERNAL MEDICINE

## 2023-03-01 PROCEDURE — 96372 THER/PROPH/DIAG INJ SC/IM: CPT | Performed by: INTERNAL MEDICINE

## 2023-03-01 RX ADMIN — MEDROXYPROGESTERONE ACETATE 150 MG: 150 INJECTION, SUSPENSION INTRAMUSCULAR at 09:23:00

## 2023-04-18 NOTE — TELEPHONE ENCOUNTER
[facility] pharmacy following up on request for refill of omeprazole. Patient is almost out of medication. Medication pended. Griseofulvin Pregnancy And Lactation Text: This medication is Pregnancy Category X and is known to cause serious birth defects. It is unknown if this medication is excreted in breast milk but breast feeding should be avoided.

## 2023-04-27 ENCOUNTER — OFFICE VISIT (OUTPATIENT)
Dept: OBGYN CLINIC | Facility: CLINIC | Age: 43
End: 2023-04-27

## 2023-04-27 VITALS
WEIGHT: 246.38 LBS | BODY MASS INDEX: 42 KG/M2 | DIASTOLIC BLOOD PRESSURE: 86 MMHG | HEART RATE: 106 BPM | SYSTOLIC BLOOD PRESSURE: 148 MMHG

## 2023-04-27 DIAGNOSIS — Z30.09 BIRTH CONTROL COUNSELING: Primary | ICD-10-CM

## 2023-04-27 PROCEDURE — 99202 OFFICE O/P NEW SF 15 MIN: CPT | Performed by: OBSTETRICS & GYNECOLOGY

## 2023-04-27 PROCEDURE — 3079F DIAST BP 80-89 MM HG: CPT | Performed by: OBSTETRICS & GYNECOLOGY

## 2023-04-27 PROCEDURE — 3077F SYST BP >= 140 MM HG: CPT | Performed by: OBSTETRICS & GYNECOLOGY

## 2023-05-16 DIAGNOSIS — I10 ESSENTIAL HYPERTENSION: ICD-10-CM

## 2023-05-16 DIAGNOSIS — K21.9 GASTROESOPHAGEAL REFLUX DISEASE WITHOUT ESOPHAGITIS: ICD-10-CM

## 2023-05-17 RX ORDER — OMEPRAZOLE 40 MG/1
40 CAPSULE, DELAYED RELEASE ORAL DAILY
Qty: 90 CAPSULE | Refills: 3 | Status: SHIPPED | OUTPATIENT
Start: 2023-05-17

## 2023-05-17 NOTE — TELEPHONE ENCOUNTER
Please review. Protocol failed / No Protocol.     Requested Prescriptions   Pending Prescriptions Disp Refills    AMLODIPINE 5 MG Oral Tab [Pharmacy Med Name: AMLODIPINE BESYLATE 5MG TABLETS] 90 tablet 0     Sig: TAKE 1 TABLET(5 MG) BY MOUTH IN THE MORNING       Hypertensive Medications Protocol Failed - 5/16/2023  4:58 PM        Failed - Last BP reading less than 140/90     BP Readings from Last 1 Encounters:  04/27/23 : 148/86                Passed - In person appointment in the past 12 or next 3 months     Recent Outpatient Visits              2 weeks ago Birth control counseling    [address], [address] [address] [facility], [address] [facility] [address] Simin Rodriguez MD    Office Visit    2 months ago Primary hypertension    [facility], [address], [place] Elizabeth Atkinson MD    Office Visit    5 months ago Left non-suppurative otitis media    [facility], [address], [place] [facility], [employer] Visit    5 months ago Primary hypertension    [address], [address], [place] Elizabeth Atkinson MD    Office Visit    8 months ago Encounter for Depo-Provera contraception    [address], [address] [facility], [facility] [address]    Nurse Only          Future Appointments         Provider Department Appt Notes    In 6 days Elizabeth Atkinson MD [address], [address], [place] Follow up    In 3 weeks [facility] OF THE [place] [address] [address] 9/26/20               Passed - CMP or BMP in past 6 months     Recent Results (from the past 4392 hour(s))   COMP METABOLIC PANEL (14)    Collection Time: 12/06/22 10:30 AM   Result Value Ref Range    Glucose 87 70 - 99 mg/dL    Sodium 141 136 - 145 mmol/L    Potassium 4.2 3.5 - 5.1 mmol/L    Chloride 106 98 - 112 mmol/L    CO2 28.0 21.0 - 32.0 mmol/L    Anion Gap 7 0 - 18 mmol/L    BUN 10 7 - 18 mg/dL    Creatinine 0.92 0.55 - 1.02 mg/dL    BUN/CREA Ratio 10.9 10.0 - 20.0    Calcium, Total 9.6 8.5 - 10.1 mg/dL    Calculated Osmolality 290 275 - 295 mOsm/kg    eGFR-Cr 79 >=60 mL/min/1.73m2    ALT 34 13 - 56 U/L    AST 13 (L) 15 - 37 U/L    Alkaline Phosphatase 78 37 - 98 U/L    Bilirubin, Total 0.5 0.1 - 2.0 mg/dL    Total Protein 7.3 6.4 - 8.2 g/dL    Albumin 3.7 3.4 - 5.0 g/dL    Globulin  3.6 2.8 - 4.4 g/dL    A/G Ratio 1.0 1.0 - 2.0    Patient Fasting for CMP? Yes      *Note: Due to a large number of results and/or encounters for the requested time period, some results have not been displayed. A complete set of results can be found in Results Review.                  Passed - In person appointment or virtual visit in the past 6 months     Recent Outpatient Visits              2 weeks ago Birth control counseling    [address], [address] [facility],[address], [address] [facility] Beatrice Reinoso MD    Office Visit    2 months ago Primary hypertension    [facility], [address], [place] Safia Vo MD    Office Visit    5 months ago Left non-suppurative otitis media    [facility] Visit    5 months ago Primary hypertension    [address], [address], [place] Safia Vo MD    Office Visit    8 months ago Encounter for Depo-Provera contraception    [address], [address] [facility], [address]    Nurse Only          Future Appointments         Provider Department Appt Notes    In 6 days Safia Vo MD [address], [address], [place] Follow up    In 3 weeks [facility] OF [place] [address] [address] 9/26/20               Passed - EGFRCR or GFRAA > 50     GFR Evaluation  EGFRCR: 79 , resulted on 12/6/2022              SPIRONOLACTONE 25 MG Oral Tab [Pharmacy Med Name: SPIRONOLACTONE 25MG TABLETS] 90 tablet 0     Sig: TAKE 1 TABLET(25 MG) BY MOUTH IN THE MORNING       Hypertensive Medications Protocol Failed - 5/16/2023  4:58 PM        Failed - Last BP reading less than 140/90     BP Readings from Last 1 Encounters:  04/27/23 : 148/86                Passed - In person appointment in the past 12 or next 3 months     Recent Outpatient Visits              2 weeks ago Birth control counseling    Leatha Reynolds, [address] [facility],[address], [address] [facility] Umer Solis MD    Office Visit    2 months ago Primary hypertension    [facility], [address], [place] Chris Spencer MD    Office Visit    5 months ago Left non-suppurative otitis media    [facility] [employer] [address] [facility], [employer]    Office Visit    5 months ago Primary hypertension    [address], [place] Chris Spencer MD    Office Visit    8 months ago Encounter for Depo-Provera contraception    Leatha Reynolds, [address] [facility]    Nurse Only          Future Appointments         Provider Department Appt Notes    In 6 days MD Leatha Wayne, [address], [place] Follow up    In 3 weeks [facility] [address] [facility] [address] 9/26/20               Passed - CMP or BMP in past 6 months     Recent Results (from the past 4392 hour(s))   COMP METABOLIC PANEL (14)    Collection Time: 12/06/22 10:30 AM   Result Value Ref Range    Glucose 87 70 - 99 mg/dL    Sodium 141 136 - 145 mmol/L    Potassium 4.2 3.5 - 5.1 mmol/L    Chloride 106 98 - 112 mmol/L    CO2 28.0 21.0 - 32.0 mmol/L    Anion Gap 7 0 - 18 mmol/L    BUN 10 7 - 18 mg/dL    Creatinine 0.92 0.55 - 1.02 mg/dL    BUN/CREA Ratio 10.9 10.0 - 20.0    Calcium, Total 9.6 8.5 - 10.1 mg/dL    Calculated Osmolality 290 275 - 295 mOsm/kg    eGFR-Cr 79 >=60 mL/min/1.73m2    ALT 34 13 - 56 U/L    AST 13 (L) 15 - 37 U/L    Alkaline Phosphatase 78 37 - 98 U/L    Bilirubin, Total 0.5 0.1 - 2.0 mg/dL    Total Protein 7.3 6.4 - 8.2 g/dL Albumin 3.7 3.4 - 5.0 g/dL    Globulin  3.6 2.8 - 4.4 g/dL    A/G Ratio 1.0 1.0 - 2.0    Patient Fasting for CMP? Yes      *Note: Due to a large number of results and/or encounters for the requested time period, some results have not been displayed. A complete set of results can be found in Results Review.                  Passed - In person appointment or virtual visit in the past 6 months     Recent Outpatient Visits              2 weeks ago Birth control counseling    [address], [address], [address] [facility] [address] Minnie Hernández MD    Office Visit    2 months ago Primary hypertension    [facility], [address], [place] Anna Owens MD    Office Visit    5 months ago Left non-suppurative otitis media    [address], [address], [employer] [facility], [employer] Visit    5 months ago Primary hypertension    [address], [address], [place] Anna Owens MD    Office Visit    8 months ago Encounter for Depo-Provera contraception    [address], [address] [place], [address]    Nurse Only          Future Appointments         Provider Department Appt Notes    In 6 days Anna Owens MD [address], [address], [place] Follow up    In 3 weeks [facility] OF [address] 9/26/20               Passed - EGFRCR or GFRAA > 50     GFR Evaluation  EGFRCR: 79 , resulted on 12/6/2022              OMEPRAZOLE 40 MG Oral Capsule Delayed Release [Pharmacy Med Name: OMEPRAZOLE 40MG CAPSULES] 180 capsule 0     Sig: TAKE 1 CAPSULE(40 MG) BY MOUTH EVERY NIGHT IN THE MORNING AND AT BEDTIME       Gastrointestional Medication Protocol Passed - 5/16/2023  4:58 PM        Passed - In person appointment or virtual visit in the past 12 mos or appointment in next 3 mos     Recent Outpatient Visits              2 weeks ago Birth control counseling    [facility], [address] [facility] Simin Rodriguez MD    Office Visit    2 months ago Primary hypertension    [facility], [address], [place] Elizabeth Atkinson MD    Office Visit    5 months ago Left non-suppurative otitis media    [facility], [address], [facility], [employer] Visit    5 months ago Primary hypertension    [facility]    Office Visit    8 months ago Encounter for Depo-Provera contraception    Holly Barrios, [address] [place] [address]    Nurse Only          Future Appointments         Provider Department Appt Notes    In 6 days MD Holly Lynn, [address], [place] Follow up    In 3 weeks [facility] OF [facility] [address]  . [address] [place] [address] 9/26/20

## 2023-05-18 ENCOUNTER — LAB ENCOUNTER (OUTPATIENT)
Dept: LAB | Age: 43
End: 2023-05-18
Attending: INTERNAL MEDICINE
Payer: COMMERCIAL

## 2023-05-18 DIAGNOSIS — Z00.00 PHYSICAL EXAM: ICD-10-CM

## 2023-05-18 LAB
ALBUMIN SERPL-MCNC: 3.5 G/DL (ref 3.4–5)
ALBUMIN/GLOB SERPL: 0.9 {RATIO} (ref 1–2)
ALP LIVER SERPL-CCNC: 63 U/L
ALT SERPL-CCNC: 32 U/L
ANION GAP SERPL CALC-SCNC: 5 MMOL/L (ref 0–18)
AST SERPL-CCNC: 18 U/L (ref 15–37)
BILIRUB SERPL-MCNC: 0.5 MG/DL (ref 0.1–2)
BILIRUB UR QL: NEGATIVE
BUN BLD-MCNC: 9 MG/DL (ref 7–18)
BUN/CREAT SERPL: 10.7 (ref 10–20)
CALCIUM BLD-MCNC: 9.9 MG/DL (ref 8.5–10.1)
CHLORIDE SERPL-SCNC: 107 MMOL/L (ref 98–112)
CHOLEST SERPL-MCNC: 205 MG/DL (ref ?–200)
CLARITY UR: CLEAR
CO2 SERPL-SCNC: 26 MMOL/L (ref 21–32)
COLOR UR: COLORLESS
CREAT BLD-MCNC: 0.84 MG/DL
FASTING PATIENT LIPID ANSWER: YES
FASTING STATUS PATIENT QL REPORTED: YES
GFR SERPLBLD BASED ON 1.73 SQ M-ARVRAT: 88 ML/MIN/1.73M2 (ref 60–?)
GLOBULIN PLAS-MCNC: 3.7 G/DL (ref 2.8–4.4)
GLUCOSE BLD-MCNC: 94 MG/DL (ref 70–99)
GLUCOSE UR-MCNC: NORMAL MG/DL
HDLC SERPL-MCNC: 49 MG/DL (ref 40–59)
HGB UR QL STRIP.AUTO: NEGATIVE
KETONES UR-MCNC: NEGATIVE MG/DL
LDLC SERPL CALC-MCNC: 148 MG/DL (ref ?–100)
LEUKOCYTE ESTERASE UR QL STRIP.AUTO: NEGATIVE
NITRITE UR QL STRIP.AUTO: NEGATIVE
NONHDLC SERPL-MCNC: 156 MG/DL (ref ?–130)
OSMOLALITY SERPL CALC.SUM OF ELEC: 284 MOSM/KG (ref 275–295)
PH UR: 5.5 [PH] (ref 5–8)
POTASSIUM SERPL-SCNC: 4 MMOL/L (ref 3.5–5.1)
PROT SERPL-MCNC: 7.2 G/DL (ref 6.4–8.2)
PROT UR-MCNC: NEGATIVE MG/DL
SODIUM SERPL-SCNC: 138 MMOL/L (ref 136–145)
SP GR UR STRIP: 1.01 (ref 1–1.03)
TRIGL SERPL-MCNC: 46 MG/DL (ref 30–149)
UROBILINOGEN UR STRIP-ACNC: NORMAL
VLDLC SERPL CALC-MCNC: 9 MG/DL (ref 0–30)

## 2023-05-18 PROCEDURE — 80053 COMPREHEN METABOLIC PANEL: CPT | Performed by: INTERNAL MEDICINE

## 2023-05-18 PROCEDURE — 36415 COLL VENOUS BLD VENIPUNCTURE: CPT | Performed by: INTERNAL MEDICINE

## 2023-05-18 PROCEDURE — 80061 LIPID PANEL: CPT | Performed by: INTERNAL MEDICINE

## 2023-05-19 ENCOUNTER — TELEPHONE (OUTPATIENT)
Dept: INTERNAL MEDICINE CLINIC | Facility: CLINIC | Age: 43
End: 2023-05-19

## 2023-05-19 RX ORDER — SPIRONOLACTONE 25 MG/1
25 TABLET ORAL EVERY MORNING
Qty: 90 TABLET | Refills: 1 | Status: SHIPPED | OUTPATIENT
Start: 2023-05-19

## 2023-05-19 RX ORDER — AMLODIPINE BESYLATE 5 MG/1
5 TABLET ORAL EVERY MORNING
Qty: 90 TABLET | Refills: 1 | Status: SHIPPED | OUTPATIENT
Start: 2023-05-19

## 2023-05-19 NOTE — TELEPHONE ENCOUNTER
SPIRONOLACTONE 25 MG Oral Tab, TAKE 1 TABLET(25 MG) BY MOUTH IN THE MORNING, Disp: 90 tablet, Rfl: 0

## 2023-05-23 ENCOUNTER — OFFICE VISIT (OUTPATIENT)
Dept: INTERNAL MEDICINE CLINIC | Facility: CLINIC | Age: 43
End: 2023-05-23

## 2023-05-23 VITALS
HEART RATE: 88 BPM | HEIGHT: 64 IN | WEIGHT: 248 LBS | DIASTOLIC BLOOD PRESSURE: 85 MMHG | SYSTOLIC BLOOD PRESSURE: 136 MMHG | BODY MASS INDEX: 42.34 KG/M2

## 2023-05-23 DIAGNOSIS — K21.9 GASTROESOPHAGEAL REFLUX DISEASE WITHOUT ESOPHAGITIS: ICD-10-CM

## 2023-05-23 DIAGNOSIS — E78.00 PURE HYPERCHOLESTEROLEMIA: ICD-10-CM

## 2023-05-23 DIAGNOSIS — L91.8 SKIN TAG: ICD-10-CM

## 2023-05-23 DIAGNOSIS — I10 PRIMARY HYPERTENSION: Primary | ICD-10-CM

## 2023-05-23 DIAGNOSIS — Z30.42 DEPO-PROVERA CONTRACEPTIVE STATUS: ICD-10-CM

## 2023-05-23 PROCEDURE — 3075F SYST BP GE 130 - 139MM HG: CPT | Performed by: INTERNAL MEDICINE

## 2023-05-23 PROCEDURE — 81025 URINE PREGNANCY TEST: CPT | Performed by: INTERNAL MEDICINE

## 2023-05-23 PROCEDURE — 99214 OFFICE O/P EST MOD 30 MIN: CPT | Performed by: INTERNAL MEDICINE

## 2023-05-23 PROCEDURE — 96372 THER/PROPH/DIAG INJ SC/IM: CPT | Performed by: INTERNAL MEDICINE

## 2023-05-23 PROCEDURE — 3079F DIAST BP 80-89 MM HG: CPT | Performed by: INTERNAL MEDICINE

## 2023-05-23 PROCEDURE — 3008F BODY MASS INDEX DOCD: CPT | Performed by: INTERNAL MEDICINE

## 2023-05-23 RX ADMIN — MEDROXYPROGESTERONE ACETATE 150 MG: 150 INJECTION, SUSPENSION INTRAMUSCULAR at 10:04:00

## 2023-06-10 ENCOUNTER — HOSPITAL ENCOUNTER (OUTPATIENT)
Dept: MAMMOGRAPHY | Facility: HOSPITAL | Age: 43
Discharge: HOME | End: 2023-06-10
Attending: INTERNAL MEDICINE
Payer: COMMERCIAL

## 2023-06-10 DIAGNOSIS — Z12.31 SCREENING MAMMOGRAM, ENCOUNTER FOR: ICD-10-CM

## 2023-06-10 PROCEDURE — 77067 SCR MAMMO BI INCL CAD: CPT | Performed by: INTERNAL MEDICINE

## 2023-06-10 PROCEDURE — 77063 BREAST TOMOSYNTHESIS BI: CPT | Performed by: INTERNAL MEDICINE

## 2023-08-11 ENCOUNTER — OFFICE VISIT (OUTPATIENT)
Dept: INTERNAL MEDICINE CLINIC | Facility: CLINIC | Age: 43
End: 2023-08-11

## 2023-08-11 VITALS
WEIGHT: 251.19 LBS | HEIGHT: 64 IN | DIASTOLIC BLOOD PRESSURE: 83 MMHG | BODY MASS INDEX: 42.88 KG/M2 | SYSTOLIC BLOOD PRESSURE: 130 MMHG | HEART RATE: 89 BPM

## 2023-08-11 DIAGNOSIS — I10 PRIMARY HYPERTENSION: Primary | ICD-10-CM

## 2023-08-11 DIAGNOSIS — Z30.42 DEPOT CONTRACEPTION: ICD-10-CM

## 2023-08-11 DIAGNOSIS — Z83.3 FHX: DIABETES MELLITUS: ICD-10-CM

## 2023-08-11 DIAGNOSIS — E53.8 LOW SERUM VITAMIN B12: ICD-10-CM

## 2023-08-11 DIAGNOSIS — R79.0 LOW MAGNESIUM LEVEL: ICD-10-CM

## 2023-08-11 DIAGNOSIS — K21.9 GASTROESOPHAGEAL REFLUX DISEASE WITHOUT ESOPHAGITIS: ICD-10-CM

## 2023-08-11 PROCEDURE — 3079F DIAST BP 80-89 MM HG: CPT | Performed by: INTERNAL MEDICINE

## 2023-08-11 PROCEDURE — 3008F BODY MASS INDEX DOCD: CPT | Performed by: INTERNAL MEDICINE

## 2023-08-11 PROCEDURE — 99214 OFFICE O/P EST MOD 30 MIN: CPT | Performed by: INTERNAL MEDICINE

## 2023-08-11 PROCEDURE — 96372 THER/PROPH/DIAG INJ SC/IM: CPT | Performed by: INTERNAL MEDICINE

## 2023-08-11 PROCEDURE — 3075F SYST BP GE 130 - 139MM HG: CPT | Performed by: INTERNAL MEDICINE

## 2023-08-11 RX ADMIN — MEDROXYPROGESTERONE ACETATE 150 MG: 150 INJECTION, SUSPENSION INTRAMUSCULAR at 09:04:00

## 2023-08-11 NOTE — PROGRESS NOTES
HPI:    Patient ID: Rigoberto Crowder is a 38year old female. Patient presents with:  Hypertension: Also, here to receive her next depo injection. Patient presents today for   HTN , depo shot and l  Patient states she is taking her medications regularly amlodipine 5 mg and spironolactone 25 mg every day for her blood pressure and feels well patient states she is still gaining weight even though she is trying to increase her exercise  Pt  Is on low fat diet and exercise -dance-regularly  Patient states she is not eating enough but she is getting more of the calories through the drinks Gatorade and juices she is seen nutritionist who recommended her eating regular food without juices and Gatorade's  Patient is also on Depo-Provera seeing gyne as well        Hypertension  This is a chronic problem. The current episode started more than 1 year ago. The problem has been gradually improving since onset. The problem is controlled. Pertinent negatives include no palpitations, peripheral edema, PND or shortness of breath. Risk factors for coronary artery disease include obesity. Past treatments include lifestyle changes, diuretics and calcium channel blockers. The current treatment provides significant improvement. Review of Systems   HENT:  Negative for ear pain. Eyes:  Negative for pain and redness. Respiratory:  Negative for shortness of breath and wheezing. Cardiovascular:  Negative for palpitations, leg swelling and PND. Gastrointestinal:  Negative for constipation and diarrhea. Genitourinary:  Negative for frequency. Musculoskeletal:  Positive for arthralgias (left wrist lat side - 4-5  finger ache  typing a lot). Negative for back pain. Muscle twitch for  few sec occasionaly   Skin:  Negative for pallor. Neurological:  Negative for dizziness. Psychiatric/Behavioral:  Negative for confusion. The patient is not nervous/anxious.              Current Outpatient Medications Medication Sig Dispense Refill    amLODIPine 5 MG Oral Tab Take 1 tablet (5 mg total) by mouth every morning. 90 tablet 1    spironolactone 25 MG Oral Tab Take 1 tablet (25 mg total) by mouth every morning. 90 tablet 1    Omeprazole 40 MG Oral Capsule Delayed Release Take 1 capsule (40 mg total) by mouth daily. 90 capsule 3    PREVIDENT 5000 SENSITIVE 1.1-5 % Dental Paste USE TWICE DAILY IN PLACE OF REGULAR TOOTHPASTE. APPLE CIDER VINEGAR OR Take by mouth. Ascorbic Acid (VITAMIN C ADULT GUMMIES OR) Take 2 tablets by mouth daily. Vitamin B-12 1000 MCG Oral Tab Take 1 tablet by mouth daily      DiphenhydrAMINE HCl (BENADRYL ALLERGY OR) Take by mouth daily as needed. Multiple Vitamins-Minerals (MULTIVITAMIN GUMMIES WOMENS OR) Take by mouth daily. loratadine 10 MG Oral Tab Take 1 tablet (10 mg total) by mouth daily. Allergies:  Azithromycin            NAUSEA AND VOMITING  Ciprofloxacin           NAUSEA AND VOMITING  Sulfasalazine           PAIN   PHYSICAL EXAM:   Physical Exam  Vitals and nursing note reviewed. Constitutional:       General: She is not in acute distress. Appearance: She is well-developed. She is obese. HENT:      Head: Normocephalic and atraumatic. Eyes:      General: No scleral icterus. Right eye: No discharge. Left eye: No discharge. Neck:      Thyroid: No thyromegaly. Vascular: No JVD. Cardiovascular:      Rate and Rhythm: Normal rate and regular rhythm. Heart sounds: Normal heart sounds. No murmur heard. Pulmonary:      Effort: Pulmonary effort is normal. No respiratory distress. Breath sounds: Normal breath sounds. No wheezing or rales. Abdominal:      Palpations: Abdomen is soft. There is no mass. Tenderness: There is no abdominal tenderness. Musculoskeletal:      Cervical back: Neck supple. Lymphadenopathy:      Cervical: No cervical adenopathy. Skin:     General: Skin is warm and dry.              Comments: Healed  surgical scar in the right mid r abdomen - no mass no  Tenderness   no erythema -feeling well       Neurological:      Mental Status: She is alert and oriented to person, place, and time. Psychiatric:         Behavior: Behavior normal.       Blood pressure 130/83, pulse 89, height 5' 4\" (1.626 m), weight 251 lb 3.2 oz (113.9 kg), not currently breastfeeding. ASSESSMENT/PLAN:     1. Essential hypertension  Take high blood pressure medication as perscribed   Low- sodium diet   Maintain walking - as tolerated   Track and record blood pressure and heart rate at home   The side effects of medication discussed with patient   Patient verbalizes understanding and compliance  spironolactone 25 mg qd    amlodipine 5 mg daily  take it  regularly   Stable continue present management  Labs to be completed ,before the next visit time    Elevated fasting sugar   labs to complete  Keep with low sugar and carbohydrate diet  Labs  To complete A1c  hypelripidemia  Keep low saturated fat  diet   Avoid red meat and fast/fried food  fruits and vegetables   Keep active /walking ,exercise as  tolerated  Reach ideal weight   With both medications present management   Improved LDL  148 on low saturated fat diet   Not taking   Rosuvastatin 5 mg patient states she did not take the rosuvastatin due to nause.    Pt  Does not  Want to  Take it   Improving   Labs discussed  w pt     Contraception  On Depo  Provera  regularly     q  3 moths -  Tolerates well  Stable cpm  Seen gyne   Stable   CPM       Gastroesophageal reflux disease without esophagitis  Patient advised to avoid spicy food, coffee, tea, caffeinated drinks, alcohol, acidic food/juices  Advised to avoid NSAID's - Aspirin-based medications  Advised to avoid wearing  tight clothes   Advised to elevate the head of the bed   Avoid eating at least 3 hours before bedtime   Counseling on ideal weight/BMI  Take omeprazole 40 mg  qd in the morning 30-60 minutes before breakfast always on empty stomach Side effects and directions of medication discussed with patient. Patient verbalized understanding and compliance. - VITAMIN B12  Magnesium  Stable     FHx: diabetes mellitus  - HEMOGLOBIN A1C    . Low magnesium level  -   - MAGNESIUM    . Low serum vitamin B12  - VITAMIN B12    Depot contraception   Depro I'm q 3 months   tolerates well      Right mid abdominal   Lesion/ cyst -no pain or mass   Surgical wound is healed well   Stable on exam    Recommend to see  Dr Abel Hanks she is not mentally ready to see her and do work up but   will see her sometimes possibly   pt education  Patient understand the risks and benefits she still does not want to have any evaluation at this time.   States she will think about    Lmp -Allar years  Ago   -pap smear -22 normal  F/u gyne Dr Hudson Pineda This Encounter      Vitamin B12      Hemoglobin A1C      Magnesium [E]      Meds This Visit:  Requested Prescriptions      No prescriptions requested or ordered in this encounter       Imaging & Referrals:  None       #[id number]

## 2023-10-26 ENCOUNTER — LAB ENCOUNTER (OUTPATIENT)
Dept: LAB | Facility: HOSPITAL | Age: 43
End: 2023-10-26
Attending: INTERNAL MEDICINE

## 2023-10-26 LAB
ALBUMIN SERPL-MCNC: 3.6 G/DL (ref 3.4–5)
ALBUMIN/GLOB SERPL: 1 {RATIO} (ref 1–2)
ALP LIVER SERPL-CCNC: 65 U/L
ALT SERPL-CCNC: 27 U/L
ANION GAP SERPL CALC-SCNC: 7 MMOL/L (ref 0–18)
AST SERPL-CCNC: 16 U/L (ref 15–37)
BILIRUB SERPL-MCNC: 0.5 MG/DL (ref 0.1–2)
BUN BLD-MCNC: 8 MG/DL (ref 7–18)
BUN/CREAT SERPL: 8.1 (ref 10–20)
CALCIUM BLD-MCNC: 9.8 MG/DL (ref 8.5–10.1)
CHLORIDE SERPL-SCNC: 108 MMOL/L (ref 98–112)
CHOLEST SERPL-MCNC: 222 MG/DL (ref ?–200)
CO2 SERPL-SCNC: 26 MMOL/L (ref 21–32)
CREAT BLD-MCNC: 0.99 MG/DL
EGFRCR SERPLBLD CKD-EPI 2021: 72 ML/MIN/1.73M2 (ref 60–?)
EST. AVERAGE GLUCOSE BLD GHB EST-MCNC: 114 MG/DL (ref 68–126)
FASTING PATIENT LIPID ANSWER: YES
FASTING STATUS PATIENT QL REPORTED: YES
GLOBULIN PLAS-MCNC: 3.7 G/DL (ref 2.8–4.4)
GLUCOSE BLD-MCNC: 79 MG/DL (ref 70–99)
HBA1C MFR BLD: 5.6 % (ref ?–5.7)
HDLC SERPL-MCNC: 49 MG/DL (ref 40–59)
LDLC SERPL CALC-MCNC: 159 MG/DL (ref ?–100)
MAGNESIUM SERPL-MCNC: 2.2 MG/DL (ref 1.6–2.6)
NONHDLC SERPL-MCNC: 173 MG/DL (ref ?–130)
OSMOLALITY SERPL CALC.SUM OF ELEC: 289 MOSM/KG (ref 275–295)
POTASSIUM SERPL-SCNC: 3.4 MMOL/L (ref 3.5–5.1)
PROT SERPL-MCNC: 7.3 G/DL (ref 6.4–8.2)
SODIUM SERPL-SCNC: 141 MMOL/L (ref 136–145)
TRIGL SERPL-MCNC: 81 MG/DL (ref 30–149)
VIT B12 SERPL-MCNC: 1786 PG/ML (ref 193–986)
VLDLC SERPL CALC-MCNC: 16 MG/DL (ref 0–30)

## 2023-10-26 PROCEDURE — 80061 LIPID PANEL: CPT | Performed by: INTERNAL MEDICINE

## 2023-10-26 PROCEDURE — 83735 ASSAY OF MAGNESIUM: CPT | Performed by: INTERNAL MEDICINE

## 2023-10-26 PROCEDURE — 80053 COMPREHEN METABOLIC PANEL: CPT | Performed by: INTERNAL MEDICINE

## 2023-10-26 PROCEDURE — 82607 VITAMIN B-12: CPT | Performed by: INTERNAL MEDICINE

## 2023-10-26 PROCEDURE — 36415 COLL VENOUS BLD VENIPUNCTURE: CPT | Performed by: INTERNAL MEDICINE

## 2023-10-26 PROCEDURE — 83036 HEMOGLOBIN GLYCOSYLATED A1C: CPT | Performed by: INTERNAL MEDICINE

## 2023-10-31 ENCOUNTER — LAB ENCOUNTER (OUTPATIENT)
Dept: LAB | Age: 43
End: 2023-10-31
Attending: INTERNAL MEDICINE
Payer: COMMERCIAL

## 2023-10-31 ENCOUNTER — OFFICE VISIT (OUTPATIENT)
Dept: INTERNAL MEDICINE CLINIC | Facility: CLINIC | Age: 43
End: 2023-10-31

## 2023-10-31 VITALS
HEIGHT: 64 IN | SYSTOLIC BLOOD PRESSURE: 127 MMHG | DIASTOLIC BLOOD PRESSURE: 85 MMHG | WEIGHT: 252 LBS | BODY MASS INDEX: 43.02 KG/M2 | HEART RATE: 88 BPM

## 2023-10-31 DIAGNOSIS — Z30.42 ENCOUNTER FOR DEPO-PROVERA CONTRACEPTION: ICD-10-CM

## 2023-10-31 DIAGNOSIS — I10 PRIMARY HYPERTENSION: ICD-10-CM

## 2023-10-31 DIAGNOSIS — E78.00 PURE HYPERCHOLESTEROLEMIA: ICD-10-CM

## 2023-10-31 DIAGNOSIS — E87.6 LOW BLOOD POTASSIUM: ICD-10-CM

## 2023-10-31 DIAGNOSIS — Z30.42 DEPO-PROVERA CONTRACEPTIVE STATUS: Primary | ICD-10-CM

## 2023-10-31 LAB
CONTROL LINE PRESENT WITH A CLEAR BACKGROUND (YES/NO): YES YES/NO
KIT LOT #: NORMAL NUMERIC
POTASSIUM SERPL-SCNC: 3.6 MMOL/L (ref 3.5–5.1)
PREGNANCY TEST, URINE: NEGATIVE

## 2023-10-31 PROCEDURE — 36415 COLL VENOUS BLD VENIPUNCTURE: CPT | Performed by: INTERNAL MEDICINE

## 2023-10-31 PROCEDURE — 3008F BODY MASS INDEX DOCD: CPT | Performed by: INTERNAL MEDICINE

## 2023-10-31 PROCEDURE — 81025 URINE PREGNANCY TEST: CPT | Performed by: INTERNAL MEDICINE

## 2023-10-31 PROCEDURE — 84132 ASSAY OF SERUM POTASSIUM: CPT | Performed by: INTERNAL MEDICINE

## 2023-10-31 PROCEDURE — 96372 THER/PROPH/DIAG INJ SC/IM: CPT | Performed by: INTERNAL MEDICINE

## 2023-10-31 PROCEDURE — 99214 OFFICE O/P EST MOD 30 MIN: CPT | Performed by: INTERNAL MEDICINE

## 2023-10-31 PROCEDURE — 3079F DIAST BP 80-89 MM HG: CPT | Performed by: INTERNAL MEDICINE

## 2023-10-31 PROCEDURE — 3074F SYST BP LT 130 MM HG: CPT | Performed by: INTERNAL MEDICINE

## 2023-10-31 RX ADMIN — MEDROXYPROGESTERONE ACETATE 150 MG: 150 INJECTION, SUSPENSION INTRAMUSCULAR at 09:25:00

## 2023-10-31 NOTE — PROGRESS NOTES
HPI:    Patient ID: Nicolasa Forbes is a 37year old female. Patient presents with:  Hypertension: Most recent labs completed on 10/26/23. Here for her next depo provera injection      Patient presents today for   HTN ,lab  results   depo shot   Patient states she is taking her medications regularly amlodipine 5 mg and spironolactone 25 mg every day for her blood pressure and feels well   patient states she  exercise and dance  Pt  Is on low fat diet   Patient states feeling well otherwise. Denies chest pain, shortnesss of breath, palpitations, or abdominal pain, denies UTI symptoms fever or chills. Hypertension  This is a chronic problem. The current episode started more than 1 year ago. The problem has been gradually improving since onset. The problem is controlled. Pertinent negatives include no palpitations, peripheral edema, PND or shortness of breath. Risk factors for coronary artery disease include obesity. Past treatments include lifestyle changes, diuretics and calcium channel blockers. The current treatment provides significant improvement. Review of Systems   HENT:  Negative for ear pain. Eyes:  Negative for pain and redness. Respiratory:  Negative for shortness of breath and wheezing. Cardiovascular:  Negative for palpitations, leg swelling and PND. Gastrointestinal:  Negative for constipation and diarrhea. Genitourinary:  Negative for frequency. Musculoskeletal:  Negative for back pain. Skin:  Negative for pallor. Neurological:  Negative for dizziness. Psychiatric/Behavioral:  Negative for confusion. The patient is not nervous/anxious. Current Outpatient Medications   Medication Sig Dispense Refill    amLODIPine 5 MG Oral Tab Take 1 tablet (5 mg total) by mouth every morning. 90 tablet 1    spironolactone 25 MG Oral Tab Take 1 tablet (25 mg total) by mouth every morning.  90 tablet 1    Omeprazole 40 MG Oral Capsule Delayed Release Take 1 capsule (40 mg total) by mouth daily. 90 capsule 3    PREVIDENT 5000 SENSITIVE 1.1-5 % Dental Paste USE TWICE DAILY IN PLACE OF REGULAR TOOTHPASTE. APPLE CIDER VINEGAR OR Take by mouth. Ascorbic Acid (VITAMIN C ADULT GUMMIES OR) Take 2 tablets by mouth daily. Vitamin B-12 1000 MCG Oral Tab Take 1 tablet by mouth daily      DiphenhydrAMINE HCl (BENADRYL ALLERGY OR) Take by mouth daily as needed. Multiple Vitamins-Minerals (MULTIVITAMIN GUMMIES WOMENS OR) Take by mouth daily. loratadine 10 MG Oral Tab Take 1 tablet (10 mg total) by mouth daily. Allergies:  Azithromycin            NAUSEA AND VOMITING  Ciprofloxacin           NAUSEA AND VOMITING  Sulfasalazine           PAIN   PHYSICAL EXAM:   Physical Exam  Vitals and nursing note reviewed. Constitutional:       General: She is not in acute distress. Appearance: She is well-developed. She is obese. HENT:      Head: Normocephalic and atraumatic. Eyes:      General: No scleral icterus. Right eye: No discharge. Left eye: No discharge. Neck:      Thyroid: No thyromegaly. Vascular: No JVD. Cardiovascular:      Rate and Rhythm: Normal rate and regular rhythm. Heart sounds: Normal heart sounds. No murmur heard. Pulmonary:      Effort: Pulmonary effort is normal. No respiratory distress. Breath sounds: Normal breath sounds. No wheezing or rales. Abdominal:      Palpations: Abdomen is soft. There is no mass. Tenderness: There is no abdominal tenderness. Musculoskeletal:      Cervical back: Neck supple. Lymphadenopathy:      Cervical: No cervical adenopathy. Skin:     General: Skin is warm and dry. Comments:      Neurological:      Mental Status: She is alert and oriented to person, place, and time. Psychiatric:         Behavior: Behavior normal.       Blood pressure 127/85, pulse 88, height 5' 4\" (1.626 m), weight 252 lb (114.3 kg), not currently breastfeeding. ASSESSMENT/PLAN:     1. Essential hypertension  Take high blood pressure medication as perscribed   Low- sodium diet   Maintain walking - as tolerated   Track and record blood pressure and heart rate at home   The side effects of medication discussed with patient   Patient verbalizes understanding and compliance  spironolactone 25 mg qd    amlodipine 5 mg daily  take it  regularly   Stable continue present management  Labs to be completed ,before the next visit time  Stable cpm    Elevated fasting sugar   Improved normal now   Keep with low sugar and carbohydrate diet  Lab discussed with pt   A1c - 5.6 normal      hypelripidemia  Keep low saturated fat  diet   Avoid red meat and fast/fried food  fruits and vegetables   Keep active /walking ,exercise as  tolerated  Reach ideal weight   With both medications present management    on low saturated fat diet   Not taking   Rosuvastatin 5 mg patient states she did not take the rosuvastatin due to nause. Pt  Does not  Want to  Take it  Labs discussed  w pt     Contraception  On Depo  Provera  regularly     q  3 moths -  Tolerates well  Stable cpm  Seen gyne   Stable   CPM       Gastroesophageal reflux disease without esophagitis  Patient advised to avoid spicy food, coffee, tea, caffeinated drinks, alcohol, acidic food/juices  Advised to avoid NSAID's - Aspirin-based medications  Advised to avoid wearing  tight clothes   Advised to elevate the head of the bed   Avoid eating at least 3 hours before bedtime   Counseling on ideal weight/BMI  Take omeprazole 40 mg  qd in the morning 30-60 minutes before breakfast always on empty stomach Side effects and directions of medication discussed with patient. Patient verbalized understanding and compliance.     - VITAMIN B12  Magnesium  Stable       Low k recheck   Fluids 50-60 ounces per day  Pt educaton     Orders Placed This Encounter      POC Urine pregnancy test [71324]      Potassium [E]      Meds This Visit:  Requested Prescriptions      No prescriptions requested or ordered in this encounter       Imaging & Referrals:  None       [id number]

## 2023-11-19 DIAGNOSIS — I10 ESSENTIAL HYPERTENSION: ICD-10-CM

## 2023-11-20 RX ORDER — SPIRONOLACTONE 25 MG/1
25 TABLET ORAL EVERY MORNING
Qty: 90 TABLET | Refills: 1 | Status: SHIPPED | OUTPATIENT
Start: 2023-11-20

## 2023-11-20 RX ORDER — AMLODIPINE BESYLATE 5 MG/1
5 TABLET ORAL EVERY MORNING
Qty: 90 TABLET | Refills: 3 | Status: SHIPPED | OUTPATIENT
Start: 2023-11-20

## 2023-11-21 NOTE — TELEPHONE ENCOUNTER
Refill passed per [employer], [facility] protocol.      Requested Prescriptions   Pending Prescriptions Disp Refills    SPIRONOLACTONE 25 MG Oral Tab [Pharmacy Med Name: SPIRONOLACTONE 25MG TABLETS] 90 tablet 1     Sig: TAKE 1 TABLET(25 MG) BY MOUTH EVERY MORNING       Hypertensive Medications Protocol Passed - 11/19/2023  9:54 AM        Passed - In person appointment in the past 12 or next 3 months     Recent Outpatient Visits              2 weeks ago Depo-Provera contraceptive status    Tania Cramer MD    Office Visit    3 months ago Primary hypertension    [facility] [place] G[place]    Office Visit    6 months ago Primary hypertension    [facility]    Office Visit    6 months ago Birth control counseling    Lowell[facility] Karin, [address], [address] - OB/GYN Carlos Manuel Barton MD    Office Visit    8 months ago Primary hypertension    Littie Keys, [place] Martha Goes, MD    Office Visit          Future Appointments         Provider Department Appt Notes    In 3 weeks Merna Gomes MD [facility], [address], [place] Physical               Passed - Last BP reading less than 140/90     BP Readings from Last 1 Encounters:   10/31/23 127/85               Passed - CMP or BMP in past 6 months     Recent Results (from the past 4392 hour(s))   Comp Metabolic Panel (14)    Collection Time: 10/26/23  7:58 AM   Result Value Ref Range    Glucose 79 70 - 99 mg/dL    Sodium 141 136 - 145 mmol/L    Potassium 3.4 (L) 3.5 - 5.1 mmol/L    Chloride 108 98 - 112 mmol/L    CO2 26.0 21.0 - 32.0 mmol/L    Anion Gap 7 0 - 18 mmol/L    BUN 8 7 - 18 mg/dL    Creatinine 0.99 0.55 - 1.02 mg/dL    BUN/CREA Ratio 8.1 (L) 10.0 - 20.0    Calcium, Total 9.8 8.5 - 10.1 mg/dL    Calculated Osmolality 289 275 - 295 mOsm/kg    eGFR-Cr 72 >=60 mL/min/1.73m2    ALT 27 13 - 56 U/L    AST 16 15 - 37 U/L    Alkaline Phosphatase 65 37 - 98 U/L    Bilirubin, Total 0.5 0.1 - 2.0 mg/dL    Total Protein 7.3 6.4 - 8.2 g/dL    Albumin 3.6 3.4 - 5.0 g/dL    Globulin  3.7 2.8 - 4.4 g/dL    A/G Ratio 1.0 1.0 - 2.0    Patient Fasting for CMP? Yes      *Note: Due to a large number of results and/or encounters for the requested time period, some results have not been displayed. A complete set of results can be found in Results Review.                Passed - In person appointment or virtual visit in the past 6 months     Recent Outpatient Visits              2 weeks ago Depo-Provera contraceptive status    Cecile Lind MD    Office Visit    3 months ago Primary hypertension    [address], [address], [place] Shavon Cooper MD    Office Visit    6 months ago Primary hypertension    [address], [address], [place] Shavon Cooper MD    Office Visit    6 months ago Birth control counseling    [address], [address] [facility],[address], [place] - OB/GYN Cameron Marquez MD    Office Visit    8 months ago Primary hypertension    Cecile Lind MD    Office Visit          Future Appointments         Provider Department Appt Notes    In 3 weeks Shavon Cooper MD [facility], [address], [place] Physical               Passed - EGFRCR or GFRAA > 50     GFR Evaluation  EGFRCR: 72 , resulted on 10/26/2023            AMLODIPINE 5 MG Oral Tab [Pharmacy Med Name: AMLODIPINE BESYLATE 5MG TABLETS] 90 tablet 1     Sig: TAKE 1 TABLET(5 MG) BY MOUTH EVERY MORNING       Hypertensive Medications Protocol Passed - 11/19/2023  9:54 AM        Passed - In person appointment in the past 12 or next 3 months     Recent Outpatient Visits              2 weeks ago Depo-Provera contraceptive status    [address], [address], [place] Terrell Meeks MD    Office Visit    3 months ago Primary hypertension    [address], [facility], [place] Terrell Meeks MD    Office Visit    6 months ago Primary hypertension    [address], [facility], [place] Terrell Meeks MD    Office Visit    6 months ago Birth control counseling    [address], [address] [facility] [address], [address] [facility] [address] [facility] [address] Francy Stover MD    Office Visit    8 months ago Primary hypertension    Joe Mascorro, Stacie Nathan, MD Jennifer    Office Visit          Future Appointments         Provider Department Appt Notes    In 3 weeks Terrell Meeks MD [facility], [address], [place] Physical               Passed - Last BP reading less than 140/90     BP Readings from Last 1 Encounters:   10/31/23 127/85               Passed - CMP or BMP in past 6 months     Recent Results (from the past 4392 hour(s))   Comp Metabolic Panel (14)    Collection Time: 10/26/23  7:58 AM   Result Value Ref Range    Glucose 79 70 - 99 mg/dL    Sodium 141 136 - 145 mmol/L    Potassium 3.4 (L) 3.5 - 5.1 mmol/L    Chloride 108 98 - 112 mmol/L    CO2 26.0 21.0 - 32.0 mmol/L    Anion Gap 7 0 - 18 mmol/L    BUN 8 7 - 18 mg/dL    Creatinine 0.99 0.55 - 1.02 mg/dL    BUN/CREA Ratio 8.1 (L) 10.0 - 20.0    Calcium, Total 9.8 8.5 - 10.1 mg/dL    Calculated Osmolality 289 275 - 295 mOsm/kg    eGFR-Cr 72 >=60 mL/min/1.73m2    ALT 27 13 - 56 U/L    AST 16 15 - 37 U/L    Alkaline Phosphatase 65 37 - 98 U/L    Bilirubin, Total 0.5 0.1 - 2.0 mg/dL    Total Protein 7.3 6.4 - 8.2 g/dL    Albumin 3.6 3.4 - 5.0 g/dL    Globulin  3.7 2.8 - 4.4 g/dL    A/G Ratio 1.0 1.0 - 2.0    Patient Fasting for CMP? Yes      *Note: Due to a large number of results and/or encounters for the requested time period, some results have not been displayed.  A complete set of results can be found in Results Review.                Passed - In person appointment or virtual visit in the past 6 months     Recent Outpatient Visits              2 weeks ago Depo-Provera contraceptive status    Erica Chandler MD    Office Visit    3 months ago Primary hypertension    [address]    Office Visit    6 months ago Primary hypertension    [address], [facility], [place] Morley Banister, MD    Office Visit    6 months ago Birth control counseling    [address], [address], [facility] OB/GYN Trenton Bernheim, MD    Office Visit    8 months ago Primary hypertension    Erica Chandler MD    Office Visit          Future Appointments         Provider Department Appt Notes    In 3 weeks Perfecto Lundberg MD [facility], [address], [place] Physical               Passed - EGFRCR or GFRAA > 50     GFR Evaluation  EGFRCR: 72 , resulted on 10/26/2023                Future Appointments         Provider Department Appt Notes    In 3 weeks Perfecto Lundberg MD [address], [address], [place] Physical             Recent Outpatient Visits              2 weeks ago Depo-Provera contraceptive status    Gregoria Sos, [place] Morley Banister, MD    Office Visit    3 months ago Primary hypertension    Gregoria Sos, [place] Morley Banister, MD    Office Visit    6 months ago Primary hypertension    Gregoria Sos, [place] Morley Banister, MD    Office Visit    6 months ago Birth control counseling    [address], [address], [place]  OB/GYN Trenton Bernheim, MD    Office Visit    8 months ago Primary hypertension    [address], [facility] [address] [place], [place] [address] Ender Feliciano MD    Office Visit

## 2023-11-27 ENCOUNTER — OFFICE VISIT (OUTPATIENT)
Dept: FAMILY MEDICINE CLINIC | Facility: CLINIC | Age: 43
End: 2023-11-27
Payer: COMMERCIAL

## 2023-11-27 VITALS
BODY MASS INDEX: 43.02 KG/M2 | OXYGEN SATURATION: 98 % | HEART RATE: 100 BPM | RESPIRATION RATE: 18 BRPM | TEMPERATURE: 100 F | SYSTOLIC BLOOD PRESSURE: 138 MMHG | HEIGHT: 64 IN | WEIGHT: 252 LBS | DIASTOLIC BLOOD PRESSURE: 84 MMHG

## 2023-11-27 DIAGNOSIS — J06.9 UPPER RESPIRATORY INFECTION, ACUTE: Primary | ICD-10-CM

## 2023-11-27 DIAGNOSIS — H65.93 FLUID LEVEL BEHIND TYMPANIC MEMBRANE OF BOTH EARS: ICD-10-CM

## 2023-11-27 PROCEDURE — 99213 OFFICE O/P EST LOW 20 MIN: CPT | Performed by: PHYSICIAN ASSISTANT

## 2023-11-27 PROCEDURE — 3079F DIAST BP 80-89 MM HG: CPT | Performed by: PHYSICIAN ASSISTANT

## 2023-11-27 PROCEDURE — 3008F BODY MASS INDEX DOCD: CPT | Performed by: PHYSICIAN ASSISTANT

## 2023-11-27 PROCEDURE — 3075F SYST BP GE 130 - 139MM HG: CPT | Performed by: PHYSICIAN ASSISTANT

## 2023-12-12 ENCOUNTER — OFFICE VISIT (OUTPATIENT)
Dept: INTERNAL MEDICINE CLINIC | Facility: CLINIC | Age: 43
End: 2023-12-12

## 2023-12-12 VITALS
WEIGHT: 251 LBS | SYSTOLIC BLOOD PRESSURE: 130 MMHG | DIASTOLIC BLOOD PRESSURE: 82 MMHG | HEIGHT: 64 IN | BODY MASS INDEX: 42.85 KG/M2 | HEART RATE: 82 BPM

## 2023-12-12 DIAGNOSIS — E66.01 CLASS 3 SEVERE OBESITY DUE TO EXCESS CALORIES WITHOUT SERIOUS COMORBIDITY WITH BODY MASS INDEX (BMI) OF 40.0 TO 44.9 IN ADULT (HCC): ICD-10-CM

## 2023-12-12 DIAGNOSIS — M25.562 LEFT KNEE PAIN, UNSPECIFIED CHRONICITY: ICD-10-CM

## 2023-12-12 DIAGNOSIS — D22.9 NUMEROUS SKIN MOLES: ICD-10-CM

## 2023-12-12 DIAGNOSIS — Z12.11 SCREEN FOR COLON CANCER: ICD-10-CM

## 2023-12-12 DIAGNOSIS — K21.9 GASTROESOPHAGEAL REFLUX DISEASE WITHOUT ESOPHAGITIS: ICD-10-CM

## 2023-12-12 DIAGNOSIS — Z00.00 PE (PHYSICAL EXAM), ROUTINE: Primary | ICD-10-CM

## 2023-12-12 DIAGNOSIS — Z12.31 SCREENING MAMMOGRAM, ENCOUNTER FOR: ICD-10-CM

## 2023-12-12 PROCEDURE — 3079F DIAST BP 80-89 MM HG: CPT | Performed by: INTERNAL MEDICINE

## 2023-12-12 PROCEDURE — 3075F SYST BP GE 130 - 139MM HG: CPT | Performed by: INTERNAL MEDICINE

## 2023-12-12 PROCEDURE — 3008F BODY MASS INDEX DOCD: CPT | Performed by: INTERNAL MEDICINE

## 2023-12-12 PROCEDURE — 99396 PREV VISIT EST AGE 40-64: CPT | Performed by: INTERNAL MEDICINE

## 2023-12-12 PROCEDURE — 99213 OFFICE O/P EST LOW 20 MIN: CPT | Performed by: INTERNAL MEDICINE

## 2023-12-12 NOTE — PROGRESS NOTES
HPI:    Patient ID: Maricarmen Gramajo is a 37year old female. Patient presents with    Chief Complaint   Patient presents with    Physical         Patient presents today for physical exam ,   HTN ,     Patient states she is taking her medications regularly amlodipine 5 mg and spironolactone 25 mg every day for her blood pressure per pt   Has occasional left  knee pain  with  dancing - many times keeps legs in wrong position   banding or crosing makes the pain worsening pain does not happen all the time but just in certain positions. Patient denies swelling or tenderness to touch. Patient states otherwise she is doing well denies any chest pain shortness of breath chest pain or palpitation        Blood Pressure  Pertinent negatives include no neck pain. Hypertension  This is a chronic problem. The current episode started more than 1 year ago. The problem has been gradually improving since onset. The problem is controlled. Pertinent negatives include no neck pain, palpitations, peripheral edema, PND or shortness of breath. Risk factors for coronary artery disease include obesity. Past treatments include lifestyle changes, diuretics and calcium channel blockers. The current treatment provides significant improvement. Review of Systems   HENT:  Negative for ear pain. Eyes:  Negative for pain and redness. Respiratory:  Negative for shortness of breath and wheezing. Cardiovascular:  Negative for palpitations, leg swelling and PND. Gastrointestinal:  Negative for constipation and diarrhea. Heartburns stable  with medications for now    Genitourinary:  Negative for frequency. Musculoskeletal:  Negative for back pain and neck pain. Skin:  Negative for pallor. Neurological:  Negative for dizziness. Psychiatric/Behavioral:  Negative for confusion. The patient is not nervous/anxious.              Current Outpatient Medications   Medication Sig Dispense Refill    spironolactone 25 MG Oral Tab Take 1 tablet (25 mg total) by mouth every morning. 90 tablet 1    amLODIPine 5 MG Oral Tab Take 1 tablet (5 mg total) by mouth every morning. 90 tablet 3    Omeprazole 40 MG Oral Capsule Delayed Release Take 1 capsule (40 mg total) by mouth daily. 90 capsule 3    PREVIDENT 5000 SENSITIVE 1.1-5 % Dental Paste USE TWICE DAILY IN PLACE OF REGULAR TOOTHPASTE. APPLE CIDER VINEGAR OR Take by mouth. Ascorbic Acid (VITAMIN C ADULT GUMMIES OR) Take 2 tablets by mouth daily. Vitamin B-12 1000 MCG Oral Tab Take 1 tablet by mouth daily      DiphenhydrAMINE HCl (BENADRYL ALLERGY OR) Take by mouth daily as needed. Multiple Vitamins-Minerals (MULTIVITAMIN GUMMIES WOMENS OR) Take by mouth daily. loratadine 10 MG Oral Tab Take 1 tablet (10 mg total) by mouth daily. Allergies: Allergies   Allergen Reactions    Azithromycin NAUSEA AND VOMITING    Ciprofloxacin NAUSEA AND VOMITING    Sulfasalazine PAIN      PHYSICAL EXAM:   Physical Exam  Vitals and nursing note reviewed. Constitutional:       General: She is not in acute distress. Appearance: She is well-developed. She is obese. HENT:      Head: Normocephalic and atraumatic. Right Ear: Tympanic membrane, ear canal and external ear normal. There is no impacted cerumen. Left Ear: Tympanic membrane, ear canal and external ear normal. There is no impacted cerumen. Eyes:      General: No scleral icterus. Right eye: No discharge. Left eye: No discharge. Neck:      Thyroid: No thyromegaly. Vascular: No JVD. Cardiovascular:      Rate and Rhythm: Normal rate and regular rhythm. Heart sounds: Normal heart sounds. No murmur heard. Pulmonary:      Effort: Pulmonary effort is normal. No respiratory distress. Breath sounds: Normal breath sounds. No wheezing or rales. Abdominal:      Palpations: Abdomen is soft. There is no mass. Tenderness: There is no abdominal tenderness.    Musculoskeletal: Cervical back: Neck supple. Right knee: No bony tenderness. Normal range of motion. Left knee: No bony tenderness or crepitus. Normal range of motion. Lymphadenopathy:      Cervical: No cervical adenopathy. Skin:     General: Skin is warm and dry. Comments: Healed  surgical scar in the right mid r abdomen - no mass no  Tenderness   no erythema -feeling well       Neurological:      Mental Status: She is alert and oriented to person, place, and time. Psychiatric:         Behavior: Behavior normal.       Blood pressure 130/82, pulse 82, height 5' 4\" (1.626 m), weight 251 lb (113.9 kg), not currently breastfeeding. ASSESSMENT/PLAN:   Physical exam   Maintain a healthy diet , low saturated fat and low sugar diet  Keep good hydration  Maintain a regular activity /walking as tolerated   Complete labs as ordered,   Mammogram -utd   Colonoocopy  referred   Pap smear  w gyne 2022 - utd   f/u yearly exams   Preventative health maintenance tests reviewed   Immunizations reviewed  pt refusing any vaccinations at this time   patient education  Patient verbalized understanding and compliance        Essential hypertension  Take high blood pressure medication as perscribed   Low- sodium diet   Maintain walking - as tolerated   Track and record blood pressure and heart rate at home   The side effects of medication discussed with patient   Patient verbalizes understanding and compliance  spironolactone 25 mg qd    amlodipine 5 mg daily  take it  regularly   Stable continue present management  Labs to be completed before the next visit time  Continue present management    hypelripidemia  Keep low saturated fat  diet   Avoid red meat and fast/fried food  fruits and vegetables   Keep active /walking ,exercise as  tolerated  Reach ideal weight   With both medications present management   Not taking  Rosuvastatin 5 mg patient states she did not take the rosuvastatin due to nause.    Pt  Does not Want to  Take it   Recommend patient to continue with low saturated fat diet with reduction       On Depo  Provera   regularly     q  3 moths -   Stable cpm      Right mid abdominal   Lesion/ cyst -no pain or mass   Surgical wound is healed  well   No symptoms    f/u  Dr Radha Butler -  Patient understand the risks and benefits she still does not want to have any evaluation at this time. States she will think about it. GERD  Patient advised to avoid spicy food, coffee, tea, caffeinated drinks, alcohol, acidic food/juices  Advised to avoid NSAID's - Aspirin-based medications  Advised to avoid wearing  tight clothes   Advised to elevate the head of the bed   Avoid eating at least 3 hours before bedtime   Counseling on ideal weight/BMI  Take Omeprazole 40 mg - PPIs qd in the morning 30-60 minutes before breakfast always on empty stomach if any  heartburns  can take bid 1 tab 30 min - before diner    side effects and directions of medication discussed with patient. Patient verbalized understanding and compliance.        Left knee pain   Avoid  jumping   Ice  15-20 min  tid as needed   Weiht  reduction  disussed  with pt   Xr left knee - if not  resolved   And refer to Physiatry Dr Keiko Griffiths for jumana and possible         PT   Lmp - years  Ago   -pap smear -22 normal  Orders Placed This Encounter   Procedures    CBC With Differential With Platelet    Comp Metabolic Panel (14)    Lipid Panel    TSH W Reflex To Free T4    Urinalysis with Culture Reflex    Hemoglobin A1C       Meds This Visit:  Requested Prescriptions      No prescriptions requested or ordered in this encounter       Imaging & Referrals:  GASTRO - INTERNAL  DERM - INTERNAL  LOGAN JORDANA 2D+3D SCREENING BILAT (CPT=[id number])       #[id number]

## 2024-01-14 ENCOUNTER — OFFICE VISIT (OUTPATIENT)
Dept: FAMILY MEDICINE CLINIC | Facility: CLINIC | Age: 44
End: 2024-01-14
Payer: COMMERCIAL

## 2024-01-14 VITALS
RESPIRATION RATE: 16 BRPM | HEART RATE: 101 BPM | HEIGHT: 64 IN | OXYGEN SATURATION: 98 % | BODY MASS INDEX: 42.85 KG/M2 | TEMPERATURE: 99 F | WEIGHT: 251 LBS | SYSTOLIC BLOOD PRESSURE: 142 MMHG | DIASTOLIC BLOOD PRESSURE: 90 MMHG

## 2024-01-14 DIAGNOSIS — H92.02 LEFT EAR PAIN: ICD-10-CM

## 2024-01-14 DIAGNOSIS — R09.81 CONGESTION OF NASAL SINUS: ICD-10-CM

## 2024-01-14 DIAGNOSIS — H65.192 ACUTE MEE (MIDDLE EAR EFFUSION), LEFT: Primary | ICD-10-CM

## 2024-01-14 DIAGNOSIS — H69.93 DYSFUNCTION OF BOTH EUSTACHIAN TUBES: ICD-10-CM

## 2024-01-14 PROCEDURE — 3080F DIAST BP >= 90 MM HG: CPT | Performed by: NURSE PRACTITIONER

## 2024-01-14 PROCEDURE — 3008F BODY MASS INDEX DOCD: CPT | Performed by: NURSE PRACTITIONER

## 2024-01-14 PROCEDURE — 3077F SYST BP >= 140 MM HG: CPT | Performed by: NURSE PRACTITIONER

## 2024-01-14 PROCEDURE — 99213 OFFICE O/P EST LOW 20 MIN: CPT | Performed by: NURSE PRACTITIONER

## 2024-01-14 RX ORDER — AMOXICILLIN 875 MG/1
875 TABLET, COATED ORAL 2 TIMES DAILY
Qty: 14 TABLET | Refills: 0 | Status: SHIPPED | OUTPATIENT
Start: 2024-01-14 | End: 2024-01-21

## 2024-01-14 RX ORDER — FLUTICASONE PROPIONATE 50 MCG
2 SPRAY, SUSPENSION (ML) NASAL DAILY
Qty: 1 EACH | Refills: 0 | Status: SHIPPED | OUTPATIENT
Start: 2024-01-14
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
## 2024-01-14 NOTE — PATIENT INSTRUCTIONS
Eustachian tube problems  Written by the doctors and editors at [facility]     What is the eustachian tube? -- The eustachian tube is a tube that connects the middle ear (the part of the ear behind the eardrum) to the back of the nose and throat       Normally, the eustachian tube helps keep the air pressure inside the middle ear the same as the air pressure outside the middle ear. If there is a problem with the eustachian tube, the air pressure inside the middle ear won’t be the same as the air pressure outside it. This can damage the middle ear and cause ear pain, hearing loss, and other symptoms. “Ear barotrauma” is the medical term for when people have symptoms or damage in the middle ear because of air pressure differences.    Most eustachian tube problems are not serious. They usually last only a short time and get better on their own.    But eustachian tube problems sometimes lead to serious problems, such as:    ?A middle ear infection  ?A torn eardrum  ?Hearing loss  Long-term hearing loss from eustachian tube problems can also lead to language or speech problems in children.    What causes eustachian tube problems? -- Common causes of eustachian tube problems are:    ?Illnesses or conditions that make the eustachian tubes swollen or inflamed - These include colds, allergies, ear infections, or sinus infections. The sinuses are hollow areas in the bones of the face.  ?Sudden air pressure changes - Sudden air pressure changes can happen when people fly in an airplane, scuba dive, or drive in the mountains.  ?Growths that block the eustachian tube  ?Being born with an abnormal eustachian tube    What are the symptoms of a eustachian tube problem? -- Common symptoms of a eustachian tube problem include:    ?Ear pain  ?Feeling pressure or fullness in the ear  ?Trouble hearing  ?Ringing in the ear  ?Feeling dizzy  Should I see a doctor or nurse? -- See your doctor or nurse if your symptoms are severe, get worse,  or if they don’t go away after a few days.    Will I need tests? -- Probably not. Your doctor or nurse should be able to tell if you have a eustachian tube problem by learning about your symptoms and doing an exam.    If your symptoms are severe or last for a long time, your doctor or nurse might:    ?Have you see a special kind of doctor called an ear, nose, and throat doctor  ?Do tests to check your hearing  ?Do an imaging test - Imaging tests can create pictures of the inside of the body.    How are eustachian tube problems treated? -- Treatment depends on what’s causing the eustachian tube problem. Depending on your individual situation, your doctor might treat you with one or more of the following:    ?Nose sprays   -Nasal steroids such as Flonase or Nasonex   - OTC nasal sprays like Afrin (oxymetazoline) can be used every 12 hours for  NO MORE than 3 days.  Longer use leads to worsening congestion and blood  pressure issues. This medication should be avoided in patients with high blood  pressure  ?Antihistamines - These medicines are usually used to treat allergies. They help stop itching, sneezing, and runny nose symptoms. Examples: Claritin, Allegra, Zyrtec.   ?Decongestants - These medicines can help with stuffy nose symptoms. These should not be used if patient has underlying blood pressure issues (high blood pressure etc.)   -Sudafed (pseudoephedrine)- according to package instructions.     ?Antibiotic medicines - Antibiotics are not needed to treat eustachian tube problems. But if you have an infection caused by bacteria in addition to your eustachian tube problems, your doctor can treat it with antibiotics.  ?Surgery - Most people do not need surgery for eustachian tube problems. But people might need surgery if their symptoms don’t get better with medicines or they have severe or long-term symptoms.

## 2024-01-17 ENCOUNTER — OFFICE VISIT (OUTPATIENT)
Dept: INTERNAL MEDICINE CLINIC | Facility: CLINIC | Age: 44
End: 2024-01-17

## 2024-01-17 ENCOUNTER — HOSPITAL ENCOUNTER (OUTPATIENT)
Dept: GENERAL RADIOLOGY | Age: 44
Discharge: HOME | End: 2024-01-17
Attending: INTERNAL MEDICINE
Payer: COMMERCIAL

## 2024-01-17 VITALS
SYSTOLIC BLOOD PRESSURE: 159 MMHG | DIASTOLIC BLOOD PRESSURE: 87 MMHG | HEART RATE: 92 BPM | WEIGHT: 253.13 LBS | BODY MASS INDEX: 43.22 KG/M2 | TEMPERATURE: 99 F | HEIGHT: 64 IN

## 2024-01-17 DIAGNOSIS — R06.02 SHORTNESS OF BREATH: ICD-10-CM

## 2024-01-17 DIAGNOSIS — J06.9 UPPER RESPIRATORY TRACT INFECTION, UNSPECIFIED TYPE: ICD-10-CM

## 2024-01-17 DIAGNOSIS — R06.02 SHORTNESS OF BREATH: Primary | ICD-10-CM

## 2024-01-17 PROCEDURE — 71046 X-RAY EXAM CHEST 2 VIEWS: CPT | Performed by: INTERNAL MEDICINE

## 2024-01-17 PROCEDURE — 3079F DIAST BP 80-89 MM HG: CPT | Performed by: INTERNAL MEDICINE

## 2024-01-17 PROCEDURE — 3077F SYST BP >= 140 MM HG: CPT | Performed by: INTERNAL MEDICINE

## 2024-01-17 PROCEDURE — 3008F BODY MASS INDEX DOCD: CPT | Performed by: INTERNAL MEDICINE

## 2024-01-17 PROCEDURE — 99213 OFFICE O/P EST LOW 20 MIN: CPT | Performed by: INTERNAL MEDICINE

## 2024-01-17 RX ORDER — AMOXICILLIN 875 MG/1
875 TABLET, COATED ORAL 2 TIMES DAILY
Qty: 6 TABLET | Refills: 0 | Status: SHIPPED | OUTPATIENT
Start: 2024-01-17 | End: 2024-01-20
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
## 2024-01-18 ENCOUNTER — TELEPHONE (OUTPATIENT)
Dept: INTERNAL MEDICINE CLINIC | Facility: CLINIC | Age: 44
End: 2024-01-18

## 2024-01-18 LAB — SARS-COV-2 RNA RESP QL NAA+PROBE: NOT DETECTED

## 2024-01-18 NOTE — TELEPHONE ENCOUNTER
Covid test was negative, please notify patient. Looks like she is not active on My chart.  Thank you

## 2024-01-18 NOTE — TELEPHONE ENCOUNTER
Spoke to patient, verified .  Informed pt of negative covid result.  Pt verbalized understanding.

## 2024-01-23 ENCOUNTER — NURSE ONLY (OUTPATIENT)
Dept: INTERNAL MEDICINE CLINIC | Facility: CLINIC | Age: 44
End: 2024-01-23

## 2024-01-23 DIAGNOSIS — Z30.42 DEPO-PROVERA CONTRACEPTIVE STATUS: Primary | ICD-10-CM

## 2024-01-23 PROCEDURE — 96372 THER/PROPH/DIAG INJ SC/IM: CPT | Performed by: INTERNAL MEDICINE

## 2024-01-23 RX ADMIN — MEDROXYPROGESTERONE ACETATE 150 MG: 150 INJECTION, SUSPENSION INTRAMUSCULAR at 08:38:00

## 2024-04-11 ENCOUNTER — HOSPITAL ENCOUNTER (OUTPATIENT)
Dept: GENERAL RADIOLOGY | Age: 44
Discharge: HOME | End: 2024-04-11
Attending: NURSE PRACTITIONER
Payer: COMMERCIAL

## 2024-04-11 ENCOUNTER — OFFICE VISIT (OUTPATIENT)
Dept: INTERNAL MEDICINE CLINIC | Facility: CLINIC | Age: 44
End: 2024-04-11
Payer: COMMERCIAL

## 2024-04-11 VITALS
SYSTOLIC BLOOD PRESSURE: 137 MMHG | BODY MASS INDEX: 42.68 KG/M2 | HEIGHT: 64 IN | WEIGHT: 250 LBS | DIASTOLIC BLOOD PRESSURE: 88 MMHG | HEART RATE: 91 BPM

## 2024-04-11 DIAGNOSIS — M54.2 NECK PAIN: Primary | ICD-10-CM

## 2024-04-11 DIAGNOSIS — M25.511 ACUTE PAIN OF RIGHT SHOULDER: ICD-10-CM

## 2024-04-11 DIAGNOSIS — M54.2 NECK PAIN: ICD-10-CM

## 2024-04-11 PROCEDURE — 99214 OFFICE O/P EST MOD 30 MIN: CPT | Performed by: NURSE PRACTITIONER

## 2024-04-11 PROCEDURE — 72040 X-RAY EXAM NECK SPINE 2-3 VW: CPT | Performed by: NURSE PRACTITIONER

## 2024-04-11 PROCEDURE — 73030 X-RAY EXAM OF SHOULDER: CPT | Performed by: NURSE PRACTITIONER

## 2024-04-11 RX ORDER — CYCLOBENZAPRINE HCL 5 MG
5 TABLET ORAL NIGHTLY PRN
Qty: 10 TABLET | Refills: 0 | Status: SHIPPED | OUTPATIENT
Start: 2024-04-11
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
## 2024-04-16 ENCOUNTER — TELEPHONE (OUTPATIENT)
Dept: PHYSICAL THERAPY | Facility: HOSPITAL | Age: 44
End: 2024-04-16

## 2024-04-16 ENCOUNTER — NURSE ONLY (OUTPATIENT)
Dept: INTERNAL MEDICINE CLINIC | Facility: CLINIC | Age: 44
End: 2024-04-16

## 2024-04-16 ENCOUNTER — OFFICE VISIT (OUTPATIENT)
Dept: PHYSICAL MEDICINE AND REHAB | Facility: CLINIC | Age: 44
End: 2024-04-16
Payer: COMMERCIAL

## 2024-04-16 VITALS
WEIGHT: 250 LBS | SYSTOLIC BLOOD PRESSURE: 142 MMHG | BODY MASS INDEX: 43 KG/M2 | HEART RATE: 84 BPM | DIASTOLIC BLOOD PRESSURE: 90 MMHG | OXYGEN SATURATION: 98 %

## 2024-04-16 DIAGNOSIS — M75.81 RIGHT ROTATOR CUFF TENDONITIS: ICD-10-CM

## 2024-04-16 DIAGNOSIS — M79.18 CERVICAL MYOFASCIAL PAIN SYNDROME: Primary | ICD-10-CM

## 2024-04-16 DIAGNOSIS — Z30.42 DEPO-PROVERA CONTRACEPTIVE STATUS: Primary | ICD-10-CM

## 2024-04-16 PROCEDURE — 99204 OFFICE O/P NEW MOD 45 MIN: CPT | Performed by: PHYSICAL MEDICINE & REHABILITATION

## 2024-04-16 PROCEDURE — 81025 URINE PREGNANCY TEST: CPT | Performed by: INTERNAL MEDICINE

## 2024-04-16 PROCEDURE — 96372 THER/PROPH/DIAG INJ SC/IM: CPT | Performed by: INTERNAL MEDICINE

## 2024-04-16 RX ORDER — MELOXICAM 15 MG/1
TABLET ORAL
Qty: 20 TABLET | Refills: 0 | Status: SHIPPED | OUTPATIENT
Start: 2024-04-16

## 2024-04-16 RX ADMIN — MEDROXYPROGESTERONE ACETATE 150 MG: 150 INJECTION, SUSPENSION INTRAMUSCULAR at 08:25:00

## 2024-04-16 NOTE — PATIENT INSTRUCTIONS
Stop the ibuprofen. Do not take meloxicam and ibuprofen together. Take the meloxicam in the morning with food.    Come see me after physical therapy if still having lingering symptoms.

## 2024-04-17 NOTE — PROGRESS NOTES
SPINE EVALUATION:     Diagnosis:   Neck pain (M54.2)  Acute pain of right shoulder (M25.511)      Referring Provider: DONG Pike  Date of Evaluation:    4/18/2024    Precautions:  None Next MD visit:   none scheduled  Date of Surgery: n/a     PATIENT SUMMARY   Oscar Hair is a 43 year old female who presents to therapy today with complaints of R-sided neck and shoulder pain that started 4/2/2024. Pt reports that she woke up that morning and believes she may have slept wrong. Hx of same sx 6 years ago. Pt reports inflammation, tightness and pulling in the right UT area. She reports that the distal end of the collarbone is tender.  Pt describes pain level current 4-5/10, at best 4/10, at worst 6/10.   Current functional limitations include lifting the shoulder overhead, bringing the hand behind the back, computer work/typing, turning the head to the left side.    Camilia describes prior level of function as good. Pt goals include lifting shoulder overhead, bringing the hand behind the back, work-related duties such as typing, turning head to the left side, reducing pain.  Past medical history was reviewed with Oscar.   Pt denies diplopia, dysarthria, dysphasia, dizziness, drop attacks, bowel/bladder changes, saddle anesthesia, and SARA LE N/T.    ASSESSMENT  Oscar presents to physical therapy evaluation with primary c/o R-sided neck and shoulder pain. The results of the objective tests and measures show decreased cervical and R shoulder ROM, UE strength, cervical and anterior shoulder musculature muscle length and cervical joint mobility restrictions as well as abnormal posture. Functional deficits include but are not limited to lifting the shoulder overhead, bringing the hand behind the back, computer work/typing, turning the head to the left side. Signs and symptoms are consistent with diagnosis of Neck pain (M54.2), Acute pain of right shoulder (M25.511). Pt and PT discussed evaluation  findings, pathology, POC and HEP.  Pt voiced understanding and performs HEP correctly without reported pain. Skilled Physical Therapy is medically necessary to address the above impairments and reach functional goals.     OBJECTIVE:   Observation/Posture: FHP with rounded shoulders, guarding RUE  Neuro Screen: WNL    Cervical AROM: (* denotes performed with pain)  Flexion: 80*/90  Extension: 55*/70  Sidebending: R 40/45; L 35*/45  Rotation: R 75*/90; L 85/90    Shoulder Flexion: R/L: 140*/180  Shoulder Abduction: R/L 140*/180  Shoulder ER: R 70*/90  Shoulder IR: R 40*/80    Accessory motion: Mild hypomobility of the C3-C4, C4-C5, C5-C7 segments in a P/A direction, mild hypomobility of the GHJ in the A/P direction    Palpation: TTP: R :  Cervical Paraspinals  Subocciptals  UT/LS  Pec Major/Minor  SCM  Supraspinatus  Acromioclavicular Joint    Strength: (* denotes performed with pain)  UE/Scapular   Shoulder Flex: R 3+*/5, L 4/5  Shoulder ABD (C5): R 4-/5, L 4/5  Biceps (C6): R 4/5, L 4/5  Wrist ext (C6): R 4/5, L 4/5  Wrist Flex (C7): R 4/5, L 4/5  Thumb Ext (C8): R 4/5, L 4/5  Interossei (T1): R 4/5, L 4/5    Rhomboids: R 3*/5, L 3*/5  Mid trap: R 3*/5; L 3*/5  Low trap: R 3*/5; L 3*/5     Flexibility: UE   R L   Upper trap moderately restricted moderately restricted   Levator scap moderately restricted moderately restricted   Scalenes moderately restricted moderately restricted   Pec Major moderately restricted moderately restricted   Pec Minor moderately restricted moderately restricted   Lats moderately restricted moderately restricted       Special tests: R  Jace Rod: (+)  Neer's: (+)    Today’s Treatment and Response:   Pt education was provided on exam findings, treatment diagnosis, treatment plan, expectations, and prognosis. Pt was also provided recommendations for possible soreness after evaluation, modalities as needed [ice/heat], postural corrections, ergonomics, and importance of remaining  active  Patient was instructed in and issued a HEP for:   Exercises  - Supine Cervical Retraction with Towel  - 1 x daily - 7 x weekly - 3 sets - 10 reps  - Supine Shoulder Flexion Extension AAROM with Dowel  - 1 x daily - 7 x weekly - 3 sets - 10 reps  - Seated Scapular Retraction  - 1 x daily - 7 x weekly - 3 sets - 10 reps - 3 second hold  - Seated Upper Trapezius Stretch  - 1 x daily - 7 x weekly - 3 sets - 30 second hold    Charges: PT Eval Low Complexity      Total Timed Treatment: 40 min     Total Treatment Time: 40 min     Therex: 0 minutes  Theract: 10 minutes  NMR: 0 minutes  Manual Therapy: 10 minutes    Based on clinical rationale and outcome measures, this evaluation involved Low Complexity decision making due to 1-2 personal factors/comorbidities, body structures involved/activity limitations, and unstable symptoms including changing pain levels.    PLAN OF CARE:    Goals: (to be met in 10 visits)   Pt will improve cervical AROM flexion to 90 degrees to improve tolerance for looking down to tie shoes   Pt will improve R shoulder AROM to 170+ degrees to improve tolerance for putting dishes into overhead cabinets   Pt will improve cervical AROM rotation to >90 degrees to improve tolerance for turning head to check blind spot while driving  Pt will have improved thoracic PA mobility to WNL to improve cervical ROM as well as promote upright posturing and decreased pain with sitting at the desk and typing for work   Pt will demonstrate improved cervical intrinsic strength to 4+/5 to allow improved cervical stabilization with overhead reaching (10 visits)  Pt will improve postural strength (mid/low trap) to 4/5 to promote improved upright posturing and decreased pain with work-related activities   Pt will be independent and compliant with comprehensive HEP to maintain progress achieved in PT       Frequency / Duration: Patient will be seen for 2 x/week or a total of 10 visits over a 90 day period.    Treatment will include: Manual Therapy, Mechanical Traction, Neuromuscular Re-education, Self-Care Home Management, Therapeutic Activities, Therapeutic Exercise, and Home Exercise Program instruction    Education or treatment limitation: None  Rehab Potential:good    Patient/Family/Caregiver was advised of these findings, precautions, and treatment options and has agreed to actively participate in planning and for this course of care.    Thank you for your referral. Please co-sign or sign and return this letter via fax as soon as possible to [phone]. If you have any questions, please contact me at Dept: [phone]    Sincerely,  Electronically signed by therapist: Jane Loving PT    Physician's certification required: Yes  I certify the need for these services furnished under this plan of treatment and while under my care.    X___________________________________________________ Date____________________    Certification From: 4/17/2024  To:7/16/2024

## 2024-04-17 NOTE — H&P
History and Physical    C/C:   Chief Complaint   Patient presents with    Neck Pain     Previous patient is back for R side neck and R shoulder pain. She woke up on 04/02/24 with pain and thought she slept wrong. Denies t/n. Motrin and tylenol daily. Ice/heat prn. XR 04/11/24. No PT yet. No recent injections. Pain 6/10.      HPI: 43 year old female with history of GERD, HTN presents with recurrent neck/upper trapezial pain that worsens with lifting the arm overhead. I have seen her for nearly identical symptoms in 2018 that did end up completely resolving. She was doing reasonably well until earlier this year when she awoke with right upper trapezial and neck pain without inciting event. Has difficulty lifting the arm overhead. Tightness/pain over the right AC joint area, pectoral region, upper trapezial region. No n/t in the arm or the hand. Seen primary care, ordered XR right shoulder and cervical spine, prescribed flexeril which she did not take due to SE of drowsiness the last time it was prescribed to her years ago. Was recommended PT but has not yet started. Has been taking ibuprofen.     Allergies:   Allergies   Allergen Reactions    Azithromycin NAUSEA AND VOMITING    Ciprofloxacin NAUSEA AND VOMITING    Sulfasalazine PAIN        Patient-reported ROS  Constitutional  Sleep Disturbance: admits  Chills: denies  Fever: denies  Weight Gain: denies  Weight Loss: denies   Cardiovascular  Chest Pain: denies  Irregular Heartbeat: denies   Respiratory  Painful Breathing: denies  Wheezing: denies   Gastrointestinal  Bowel Incontinence: denies  Heartburn: denies  Abdominal Pain: denies  Blood in Stool : denies  Rectal Pain: denies   Hematology  Easy Bruising: denies  Easy Bleeding: denies   Genitourinary  Difficulty Urinating: denies  Bladder Incontinence: denies  Pelvic Pain: denies  Painful Urination: denies   Musculoskeletal  Joint Stiffness: admits  Painful Joints: admits  Tailbone Pain: denies  Swollen Joints:  denies   Peripheral Vascular  Swelling of Legs/Feet: denies  Cold Extremities: denies   Skin  Open Sores: denies  Nodules or Lumps: denies  Rash: denies   Neurological  Loss of Strength Since last Visit: admits  Tingling/Numbness: denies  Balance: denies   Psychiatric  Anxiety: denies  Depressed Mood: denies      Physical exam:  /90   Pulse 84   Wt 250 lb (113.4 kg)   SpO2 98%   BMI 42.91 kg/m²     Cervical spine exam:    No neck rash  + ttp cervical paraspinals right. + ttp upper trapezius right  Cervical extension 50 degrees. Cervical flexion 50 degrees. Cervical rotation to the right 75 degrees. Cervical rotation to the left 75 degrees  Spurling's test to the right negative  5/5 strength in shoulder abduction, elbow flexion, elbow extension, wrist extension, finger flexion, FDI bilaterally  SILT b/l UE C5-T1 distributions  2/4 biceps, brachioradialis, triceps reflexes b/l  Roberson test negative b/l    PE right shoulder exam:    Range of motion:  Forward flexion: 160 degrees   Abduction: 160 degrees  Internal rotation: T7 with pain  External rotation: no restriction to PROM    Provocative test:  Supraspinatus test: +  Hawkin's test: +  Neer's test: +  AC joint tenderness: mildly tender    Imaging: XR cervical spine independently reviewed with patient as was report dated 4/11/2024; normal imaging. XR right shoulder indepenently reviewed, report also reviewed. Normal imaging of the right shoulder.     Assessment and plan:  Right subacromial impingement syndrome  Cervical myofascial pain    Should improve with PT and NSAIDs. Recommend she take meloxicam 15mg qDaily x5 days, then qDaily PRN pain afterwards, physical therapy for scapular stabilization, RTC strengthening, and myofascial soft tissue techniques. Follow up in 6 weeks PRN.     Lewis Fontaine DO  Physical Medicine and Rehabilitation  [facility]

## 2024-04-18 ENCOUNTER — OFFICE VISIT (OUTPATIENT)
Dept: PHYSICAL THERAPY | Age: 44
End: 2024-04-18
Attending: NURSE PRACTITIONER
Payer: COMMERCIAL

## 2024-04-18 DIAGNOSIS — M25.511 ACUTE PAIN OF RIGHT SHOULDER: ICD-10-CM

## 2024-04-18 DIAGNOSIS — M54.2 CERVICALGIA: Primary | ICD-10-CM

## 2024-04-18 PROCEDURE — 97140 MANUAL THERAPY 1/> REGIONS: CPT | Performed by: PHYSICAL THERAPIST

## 2024-04-18 PROCEDURE — 97530 THERAPEUTIC ACTIVITIES: CPT | Performed by: PHYSICAL THERAPIST

## 2024-04-23 ENCOUNTER — OFFICE VISIT (OUTPATIENT)
Dept: PHYSICAL THERAPY | Age: 44
End: 2024-04-23
Attending: NURSE PRACTITIONER
Payer: COMMERCIAL

## 2024-04-23 DIAGNOSIS — M54.2 CERVICALGIA: Primary | ICD-10-CM

## 2024-04-23 DIAGNOSIS — M25.511 ACUTE PAIN OF RIGHT SHOULDER: ICD-10-CM

## 2024-04-23 PROCEDURE — 97530 THERAPEUTIC ACTIVITIES: CPT | Performed by: PHYSICAL THERAPIST

## 2024-04-23 PROCEDURE — 97110 THERAPEUTIC EXERCISES: CPT | Performed by: PHYSICAL THERAPIST

## 2024-04-23 PROCEDURE — 97140 MANUAL THERAPY 1/> REGIONS: CPT | Performed by: PHYSICAL THERAPIST

## 2024-04-23 NOTE — PROGRESS NOTES
Diagnosis:   Neck pain (M54.2)    Acute pain of right shoulder (M25.511)       Referring Provider: DONG Pike Date of Evaluation:    4/18/2024    Precautions:  None Next MD visit:   none scheduled  Date of Surgery: n/a   Insurance Primary/Secondary: [employer] / N/A     # Auth Visits: 10            Subjective: Pt reports \"the neck and shoulder are still painful - I have been my exercises at home and my motion is slightly better.\"    Pain: 4.5-5/10    Objective: See table below.  Manual Therapy: x25'  STW: Cervical Paraspinals, Subocciptals, UT/LS, Pec Major/Minor, SCM, Supraspinatus  PROM: R shoulder in all ranges    Assessment: Pt had moderate discomfort with shoulder ROM and cervical ROM exercises. TTP to R cervical paraspinals, biceps tendon, UT/LS, pec minor and supraspinatus tendon. Gentle AAROM and PROM performed to help with increasing motion. Pt given a ice pack to go for the shoulder.      Goals:   (to be met in 10 visits)   Pt will improve cervical AROM flexion to 90 degrees to improve tolerance for looking down to tie shoes   Pt will improve R shoulder AROM to 170+ degrees to improve tolerance for putting dishes into overhead cabinets   Pt will improve cervical AROM rotation to >90 degrees to improve tolerance for turning head to check blind spot while driving  Pt will have improved thoracic PA mobility to WNL to improve cervical ROM as well as promote upright posturing and decreased pain with sitting at the desk and typing for work   Pt will demonstrate improved cervical intrinsic strength to 4+/5 to allow improved cervical stabilization with overhead reaching (10 visits)  Pt will improve postural strength (mid/low trap) to 4/5 to promote improved upright posturing and decreased pain with work-related activities   Pt will be independent and compliant with comprehensive HEP to maintain progress achieved in PT    Plan: Progress skilled PT as tolerated to help with shoulder and cervical  ROM.  Date: 4/23/2024  TX#: 2/10 Date:                 TX#: 3/ Date:                 TX#: 4/ Date:                 TX#: 5/ Date:   Tx#: 6/   AAROM Chest Press: 2x10        AAROM Shoulder Flexion: x10       Chin Tucks: x15 supine       Mulligan Mobilization: x15 cervical rotation to the L       Upper Trap Stretch: 2x30\" R       Pec Stretch: 2x30\" arms down       Scapular Retraction: x15 with 3\" hold       Pulleys: x15 Flexion       HEP:   Exercises  - Supine Cervical Retraction with Towel  - 1 x daily - 7 x weekly - 3 sets - 10 reps  - Supine Shoulder Flexion Extension AAROM with Dowel  - 1 x daily - 7 x weekly - 3 sets - 10 reps  - Seated Scapular Retraction  - 1 x daily - 7 x weekly - 3 sets - 10 reps - 3 second hold  - Seated Upper Trapezius Stretch  - 1 x daily - 7 x weekly - 3 sets - 30 second hold    Charges:   Therex: 10 minutes  Theract: 8 minutes  NMR: 0 minutes  Manual Therapy: 25 minutes  Total Timed Treatment: 43 min  Total Treatment Time: 43 min

## 2024-04-25 ENCOUNTER — OFFICE VISIT (OUTPATIENT)
Dept: PHYSICAL THERAPY | Age: 44
End: 2024-04-25
Attending: NURSE PRACTITIONER
Payer: COMMERCIAL

## 2024-04-25 DIAGNOSIS — M25.511 ACUTE PAIN OF RIGHT SHOULDER: ICD-10-CM

## 2024-04-25 DIAGNOSIS — M54.2 CERVICALGIA: Primary | ICD-10-CM

## 2024-04-25 PROCEDURE — 97110 THERAPEUTIC EXERCISES: CPT | Performed by: PHYSICAL THERAPIST

## 2024-04-25 PROCEDURE — 97140 MANUAL THERAPY 1/> REGIONS: CPT | Performed by: PHYSICAL THERAPIST

## 2024-04-25 PROCEDURE — 97014 ELECTRIC STIMULATION THERAPY: CPT | Performed by: PHYSICAL THERAPIST

## 2024-04-25 NOTE — PROGRESS NOTES
Diagnosis:   Neck pain (M54.2)    Acute pain of right shoulder (M25.511)       Referring Provider: DONG Pike Date of Evaluation:    4/18/2024    Precautions:  None Next MD visit:   none scheduled  Date of Surgery: n/a   Insurance Primary/Secondary: [employer] / N/A     # Auth Visits: [id number]            Subjective: Pt reports \"the neck and shoulder are worse today, I woke up at 2:30 am in a lot of pain - no tingling/numbness.\"    Pain: 6.5-5/10    Objective: See table below.  Manual Therapy: x25'  STW: Cervical Paraspinals, Subocciptals, UT/LS, Pec Major/Minor, SCM, Supraspinatus  PROM: R shoulder in all ranges    Assessment: E-stim with heat pack added today due to high pain levels that pt responded well to. Gentle PROM to cervical and R shoulder performed to help increase ROM and prevent frozen shoulder.       Goals:   (to be met in 10 visits)   Pt will improve cervical AROM flexion to 90 degrees to improve tolerance for looking down to tie shoes   Pt will improve R shoulder AROM to 170+ degrees to improve tolerance for putting dishes into overhead cabinets   Pt will improve cervical AROM rotation to >90 degrees to improve tolerance for turning head to check blind spot while driving  Pt will have improved thoracic PA mobility to WNL to improve cervical ROM as well as promote upright posturing and decreased pain with sitting at the desk and typing for work   Pt will demonstrate improved cervical intrinsic strength to 4+/5 to allow improved cervical stabilization with overhead reaching (10 visits)  Pt will improve postural strength (mid/low trap) to 4/5 to promote improved upright posturing and decreased pain with work-related activities   Pt will be independent and compliant with comprehensive HEP to maintain progress achieved in PT    Plan: Progress skilled PT as tolerated to help with shoulder and cervical ROM.  Date: 4/23/2024  TX#: 2/10 Date: 4/25/2024                TX#: 3/10 Date:                  TX#: 4/ Date:                 TX#: 5/ Date:   Tx#: 6/   AAROM Chest Press: 2x10  Therex: x8'  PROM: cervical in all ranges and R shoulder in flexion      AAROM Shoulder Flexion: x10 E-stim: x10' with heat pack  R Cervical & Shoulder  IFC,  Hz, 15 mA      Chin Tucks: x15 supine       Mulligan Mobilization: x15 cervical rotation to the L       Upper Trap Stretch: 2x30\" R       Pec Stretch: 2x30\" arms down       Scapular Retraction: x15 with 3\" hold       Pulleys: x15 Flexion       HEP:   Exercises  - Supine Cervical Retraction with Towel  - 1 x daily - 7 x weekly - 3 sets - 10 reps  - Supine Shoulder Flexion Extension AAROM with Dowel  - 1 x daily - 7 x weekly - 3 sets - 10 reps  - Seated Scapular Retraction  - 1 x daily - 7 x weekly - 3 sets - 10 reps - 3 second hold  - Seated Upper Trapezius Stretch  - 1 x daily - 7 x weekly - 3 sets - 30 second hold    Charges:   Therex: 8 minutes  Theract: 0 minutes  NMR: 0 minutes  Manual Therapy: 25 minutes  E-stim: 10 minutes  Total Timed Treatment: 43 min  Total Treatment Time: 43 min

## 2024-04-29 NOTE — PROGRESS NOTES
Diagnosis:   Neck pain (M54.2)    Acute pain of right shoulder (M25.511)       Referring Provider: DONG Pike Date of Evaluation:    4/18/2024    Precautions:  None Next MD visit:   none scheduled  Date of Surgery: n/a   Insurance Primary/Secondary: [employer] [facility] / N/A     # Auth Visits: 10            Subjective: Pt reports \"I did not do much over the weekend so the neck and shoulder pain is not as bad; however, washing my hair took a few days because I was unable to lift the shoulder overhead without pain - I brought my TENS unit in.\"    Pain: 3-4/10    Objective: See table below.  Manual Therapy: x10'  STW: Cervical Paraspinals, Subocciptals, UT/LS, Pec Major/Minor, SCM, Supraspinatus  PROM: R shoulder in all ranges    Assessment: Continued with E-stim to help with pain modulation. Pt was able to tolerate increased STW to the cervical and periscapular area. TTP to LHB tendon. Incorporated pulleys and scapular strengthening to help avoid adhesive capsulitis of the shoulder. Pt instructed on how to use home TENS unit to help with pain.      Goals:   (to be met in 10 visits)   Pt will improve cervical AROM flexion to 90 degrees to improve tolerance for looking down to tie shoes   Pt will improve R shoulder AROM to 170+ degrees to improve tolerance for putting dishes into overhead cabinets   Pt will improve cervical AROM rotation to >90 degrees to improve tolerance for turning head to check blind spot while driving  Pt will have improved thoracic PA mobility to WNL to improve cervical ROM as well as promote upright posturing and decreased pain with sitting at the desk and typing for work   Pt will demonstrate improved cervical intrinsic strength to 4+/5 to allow improved cervical stabilization with overhead reaching (10 visits)  Pt will improve postural strength (mid/low trap) to 4/5 to promote improved upright posturing and decreased pain with work-related activities   Pt will be independent and  compliant with comprehensive HEP to maintain progress achieved in PT    Plan: Progress skilled PT as tolerated to help with shoulder and cervical ROM.  Date: 4/23/2024  TX#: 2/10 Date: 4/25/2024                TX#: 3/10 Date: 4/30/2024                TX#: 4/10 Date:                 TX#: 5/ Date:   Tx#: 6/   AAROM Chest Press: 2x10  Therex: x8'  PROM: cervical in all ranges and R shoulder in flexion Therex: x25'  PROM: cervical in all ranges and R shoulder in flexion  Seated Scapular Retraction: 2x10 with 3\" hold  Pulleys: 2x30 Flexion, IR     AAROM Shoulder Flexion: x10 E-stim: x10' with heat pack  R Cervical & Shoulder  IFC,  Hz, 15 mA E-stim: x10' with heat pack  R Cervical & Shoulder  IFC,  Hz, 15 mA     Chin Tucks: x15 supine       Mulligan Mobilization: x15 cervical rotation to the L       Upper Trap Stretch: 2x30\" R       Pec Stretch: 2x30\" arms down       Scapular Retraction: x15 with 3\" hold       Pulleys: x15 Flexion       HEP:   Exercises  - Supine Cervical Retraction with Towel  - 1 x daily - 7 x weekly - 3 sets - 10 reps  - Supine Shoulder Flexion Extension AAROM with Dowel  - 1 x daily - 7 x weekly - 3 sets - 10 reps  - Seated Scapular Retraction  - 1 x daily - 7 x weekly - 3 sets - 10 reps - 3 second hold  - Seated Upper Trapezius Stretch  - 1 x daily - 7 x weekly - 3 sets - 30 second hold    Charges:   Therex: 25 minutes  Theract: 0 minutes  NMR: 0 minutes  Manual Therapy: 10 minutes  E-stim: 10 minutes  Total Timed Treatment: 35 min  Total Treatment Time: 45 min

## 2024-04-30 ENCOUNTER — OFFICE VISIT (OUTPATIENT)
Dept: PHYSICAL THERAPY | Age: 44
End: 2024-04-30
Attending: NURSE PRACTITIONER
Payer: COMMERCIAL

## 2024-04-30 DIAGNOSIS — M25.511 ACUTE PAIN OF RIGHT SHOULDER: ICD-10-CM

## 2024-04-30 DIAGNOSIS — M54.2 CERVICALGIA: Primary | ICD-10-CM

## 2024-04-30 PROCEDURE — 97014 ELECTRIC STIMULATION THERAPY: CPT | Performed by: PHYSICAL THERAPIST

## 2024-04-30 PROCEDURE — 97110 THERAPEUTIC EXERCISES: CPT | Performed by: PHYSICAL THERAPIST

## 2024-04-30 PROCEDURE — 97140 MANUAL THERAPY 1/> REGIONS: CPT | Performed by: PHYSICAL THERAPIST

## 2024-05-02 ENCOUNTER — OFFICE VISIT (OUTPATIENT)
Dept: PHYSICAL THERAPY | Age: 44
End: 2024-05-02
Attending: NURSE PRACTITIONER
Payer: COMMERCIAL

## 2024-05-02 DIAGNOSIS — M54.2 CERVICALGIA: Primary | ICD-10-CM

## 2024-05-02 DIAGNOSIS — M25.511 ACUTE PAIN OF RIGHT SHOULDER: ICD-10-CM

## 2024-05-02 PROCEDURE — 97140 MANUAL THERAPY 1/> REGIONS: CPT | Performed by: PHYSICAL THERAPIST

## 2024-05-02 PROCEDURE — 97110 THERAPEUTIC EXERCISES: CPT | Performed by: PHYSICAL THERAPIST

## 2024-05-02 PROCEDURE — 97014 ELECTRIC STIMULATION THERAPY: CPT | Performed by: PHYSICAL THERAPIST

## 2024-05-02 NOTE — PROGRESS NOTES
Diagnosis:   Neck pain (M54.2)    Acute pain of right shoulder (M25.511)       Referring Provider: DONG Pike Date of Evaluation:    4/18/2024    Precautions:  None Next MD visit:   none scheduled  Date of Surgery: n/a   Insurance Primary/Secondary: [employer] / N/A     # Auth Visits: 10            Subjective: Pt reports \"I used my TENS unit at home which helped with the pain, after our last session I did feel some discomfort in the front of my shoulder; however, my neck and shoulder range of motion is improving slightly.\"    Pain: 4-4.5/10    Objective: See table below.  Manual Therapy: x10'  STW: Cervical Paraspinals, Subocciptals, UT/LS, Pec Major/Minor, SCM, Supraspinatus  PROM: R shoulder in all ranges    Assessment: Continued with E-stim to help with pain modulation. Pt was able to tolerate increased STW to the cervical and periscapular area. TTP to LHB tendon. Pt was able to tolerate increased PROM to the cervical and shoulder areas. Pt had soreness for 4 hours after the last session so PROM was continued and pulleys were discontinued for now.       Goals:   (to be met in 10 visits)   Pt will improve cervical AROM flexion to 90 degrees to improve tolerance for looking down to tie shoes   Pt will improve R shoulder AROM to 170+ degrees to improve tolerance for putting dishes into overhead cabinets   Pt will improve cervical AROM rotation to >90 degrees to improve tolerance for turning head to check blind spot while driving  Pt will have improved thoracic PA mobility to WNL to improve cervical ROM as well as promote upright posturing and decreased pain with sitting at the desk and typing for work   Pt will demonstrate improved cervical intrinsic strength to 4+/5 to allow improved cervical stabilization with overhead reaching (10 visits)  Pt will improve postural strength (mid/low trap) to 4/5 to promote improved upright posturing and decreased pain with work-related activities   Pt will be  independent and compliant with comprehensive HEP to maintain progress achieved in PT    Plan: Progress skilled PT as tolerated to help with shoulder and cervical ROM.  Date: 4/23/2024  TX#: 2/10 Date: 4/25/2024                TX#: 3/10 Date: 4/30/2024                TX#: 4/10 Date: 5/2/2024                 TX#: 5/10 Date:   Tx#: 6/   AAROM Chest Press: 2x10  Therex: x8'  PROM: cervical in all ranges and R shoulder in flexion Therex: x25'  PROM: cervical in all ranges and R shoulder in flexion  Seated Scapular Retraction: 2x10 with 3\" hold  Pulleys: 2x30 Flexion, IR Therex: x25'  PROM: cervical and R shoulder in all ranges    AAROM Shoulder Flexion: x10 E-stim: x10' with heat pack  R Cervical & Shoulder  IFC,  Hz, 15 mA E-stim: x10' with heat pack  R Cervical & Shoulder  IFC,  Hz, 15 mA E-stim: x10' with heat pack  R Cervical & Shoulder  IFC,  Hz, 15 mA    Chin Tucks: x15 supine       Mulligan Mobilization: x15 cervical rotation to the L       Upper Trap Stretch: 2x30\" R       Pec Stretch: 2x30\" arms down       Scapular Retraction: x15 with 3\" hold       Pulleys: x15 Flexion       HEP:   Exercises  - Supine Cervical Retraction with Towel  - 1 x daily - 7 x weekly - 3 sets - 10 reps  - Supine Shoulder Flexion Extension AAROM with Dowel  - 1 x daily - 7 x weekly - 3 sets - 10 reps  - Seated Scapular Retraction  - 1 x daily - 7 x weekly - 3 sets - 10 reps - 3 second hold  - Seated Upper Trapezius Stretch  - 1 x daily - 7 x weekly - 3 sets - 30 second hold    Charges:   Therex: 25 minutes  Theract: 0 minutes  NMR: 0 minutes  Manual Therapy: 10 minutes  E-stim: 10 minutes  Total Timed Treatment: 35 min  Total Treatment Time: 45 min

## 2024-05-07 ENCOUNTER — OFFICE VISIT (OUTPATIENT)
Dept: PHYSICAL THERAPY | Age: 44
End: 2024-05-07
Attending: NURSE PRACTITIONER
Payer: COMMERCIAL

## 2024-05-07 DIAGNOSIS — M25.511 ACUTE PAIN OF RIGHT SHOULDER: ICD-10-CM

## 2024-05-07 DIAGNOSIS — M54.2 CERVICALGIA: Primary | ICD-10-CM

## 2024-05-07 PROCEDURE — 97014 ELECTRIC STIMULATION THERAPY: CPT | Performed by: PHYSICAL THERAPIST

## 2024-05-07 PROCEDURE — 97110 THERAPEUTIC EXERCISES: CPT | Performed by: PHYSICAL THERAPIST

## 2024-05-07 PROCEDURE — 97140 MANUAL THERAPY 1/> REGIONS: CPT | Performed by: PHYSICAL THERAPIST

## 2024-05-07 NOTE — PROGRESS NOTES
Diagnosis:   Neck pain (M54.2)    Acute pain of right shoulder (M25.511)       Referring Provider: DONG Pike Date of Evaluation:    4/18/2024    Precautions:  None Next MD visit:   none scheduled  Date of Surgery: n/a   Insurance Primary/Secondary: [employer] / N/A     # Auth Visits: 10            Subjective: Pt reports \"the shoulder range of motion is improving; however, I cannot lift it up overhead - and it continues to feel pretty painful.\"    Pain: 3.5-4/10    Objective: See table below.  Manual Therapy: x10'  STW: Cervical Paraspinals, Subocciptals, UT/LS, Pec Major/Minor, SCM, Supraspinatus  PROM: R shoulder in all ranges    Assessment: Continued with E-stim to help with pain modulation. Pt was able to tolerate increased STW to the cervical and periscapular area. TTP to LHB tendon. Pt was able to tolerate increased PROM to the cervical and shoulder areas. Paused on active exercises today due to improved pain last session.       Goals:   (to be met in 10 visits)   Pt will improve cervical AROM flexion to 90 degrees to improve tolerance for looking down to tie shoes   Pt will improve R shoulder AROM to 170+ degrees to improve tolerance for putting dishes into overhead cabinets   Pt will improve cervical AROM rotation to >90 degrees to improve tolerance for turning head to check blind spot while driving  Pt will have improved thoracic PA mobility to WNL to improve cervical ROM as well as promote upright posturing and decreased pain with sitting at the desk and typing for work   Pt will demonstrate improved cervical intrinsic strength to 4+/5 to allow improved cervical stabilization with overhead reaching (10 visits)  Pt will improve postural strength (mid/low trap) to 4/5 to promote improved upright posturing and decreased pain with work-related activities   Pt will be independent and compliant with comprehensive HEP to maintain progress achieved in PT    Plan: Progress skilled PT as  tolerated to help with shoulder and cervical ROM.  Date: 4/23/2024  TX#: 2/10 Date: 4/25/2024                TX#: 3/10 Date: 4/30/2024                TX#: 4/10 Date: 5/2/2024                 TX#: 5/10 Date:   Tx#: 6/   AAROM Chest Press: 2x10  Therex: x8'  PROM: cervical in all ranges and R shoulder in flexion Therex: x25'  PROM: cervical in all ranges and R shoulder in flexion  Seated Scapular Retraction: 2x10 with 3\" hold  Pulleys: 2x30 Flexion, IR Therex: x25'  PROM: cervical and R shoulder in all ranges Therex: x25'  PROM: cervical and R shoulder in all ranges   AAROM Shoulder Flexion: x10 E-stim: x10' with heat pack  R Cervical & Shoulder  IFC,  Hz, 15 mA E-stim: x10' with heat pack  R Cervical & Shoulder  IFC,  Hz, 15 mA E-stim: x10' with heat pack  R Cervical & Shoulder  IFC,  Hz, 15 mA E-stim: x10' with heat pack  R Cervical & Shoulder  IFC,  Hz, 15 mA   Chin Tucks: x15 supine       Mulligan Mobilization: x15 cervical rotation to the L       Upper Trap Stretch: 2x30\" R       Pec Stretch: 2x30\" arms down       Scapular Retraction: x15 with 3\" hold       Pulleys: x15 Flexion       HEP:   Exercises  - Supine Cervical Retraction with Towel  - 1 x daily - 7 x weekly - 3 sets - 10 reps  - Supine Shoulder Flexion Extension AAROM with Dowel  - 1 x daily - 7 x weekly - 3 sets - 10 reps  - Seated Scapular Retraction  - 1 x daily - 7 x weekly - 3 sets - 10 reps - 3 second hold  - Seated Upper Trapezius Stretch  - 1 x daily - 7 x weekly - 3 sets - 30 second hold    Charges:   Therex: 25 minutes  Theract: 0 minutes  NMR: 0 minutes  Manual Therapy: 10 minutes  E-stim: 10 minutes  Total Timed Treatment: 35 min  Total Treatment Time: 45 min

## 2024-05-08 NOTE — PROGRESS NOTES
Diagnosis:   Neck pain (M54.2)    Acute pain of right shoulder (M25.511)       Referring Provider: DONG Pike Date of Evaluation:    4/18/2024    Precautions:  None Next MD visit:   none scheduled  Date of Surgery: n/a   Insurance Primary/Secondary: [employer] [facility] / N/A     # Auth Visits: 10            Subjective: Pt reports \"the shoulder range of motion is slowly improving - I still feel pain when moving my shoulder and neck.\"    Pain: 3.5/10    Objective: See table below.  Manual Therapy: x10'  STW: Cervical Paraspinals, Subocciptals, UT/LS, Pec Major/Minor, SCM, Supraspinatus  PROM: R shoulder in all ranges    Assessment: Continued with E-stim to help with pain modulation. Pt was able to tolerate increased STW to the cervical and periscapular area. Continued to pause on active exercises today due to decreased pain. Increased shoulder PROM noted in all ranges.      Goals:   (to be met in 10 visits)   Pt will improve cervical AROM flexion to 90 degrees to improve tolerance for looking down to tie shoes   Pt will improve R shoulder AROM to 170+ degrees to improve tolerance for putting dishes into overhead cabinets   Pt will improve cervical AROM rotation to >90 degrees to improve tolerance for turning head to check blind spot while driving  Pt will have improved thoracic PA mobility to WNL to improve cervical ROM as well as promote upright posturing and decreased pain with sitting at the desk and typing for work   Pt will demonstrate improved cervical intrinsic strength to 4+/5 to allow improved cervical stabilization with overhead reaching (10 visits)  Pt will improve postural strength (mid/low trap) to 4/5 to promote improved upright posturing and decreased pain with work-related activities   Pt will be independent and compliant with comprehensive HEP to maintain progress achieved in PT    Plan: Progress skilled PT as tolerated to help with shoulder and cervical ROM.  Date: 4/23/2024  TX#: 2/10  Date: 4/25/2024                TX#: 3/10 Date: 4/30/2024                TX#: 4/10 Date: 5/2/2024                 TX#: 5/10 Date: 5/7/2024  Tx#: 6/10 Date: 5/9/2024  Tx#: 7/10   AAROM Chest Press: 2x10  Therex: x8'  PROM: cervical in all ranges and R shoulder in flexion Therex: x25'  PROM: cervical in all ranges and R shoulder in flexion  Seated Scapular Retraction: 2x10 with 3\" hold  Pulleys: 2x30 Flexion, IR Therex: x25'  PROM: cervical and R shoulder in all ranges Therex: x25'  PROM: cervical and R shoulder in all ranges Therex: x25'  PROM: cervical and R shoulder in all ranges   AAROM Shoulder Flexion: x10 E-stim: x10' with heat pack  R Cervical & Shoulder  IFC,  Hz, 15 mA E-stim: x10' with heat pack  R Cervical & Shoulder  IFC,  Hz, 15 mA E-stim: x10' with heat pack  R Cervical & Shoulder  IFC,  Hz, 15 mA E-stim: x10' with heat pack  R Cervical & Shoulder  IFC,  Hz, 15 mA E-stim: x10' with heat pack  R Cervical & Shoulder  IFC,  Hz, 17 mA   Chin Tucks: x15 supine        Mulligan Mobilization: x15 cervical rotation to the L        Upper Trap Stretch: 2x30\" R        Pec Stretch: 2x30\" arms down        Scapular Retraction: x15 with 3\" hold        Pulleys: x15 Flexion        HEP:   Exercises  - Supine Cervical Retraction with Towel  - 1 x daily - 7 x weekly - 3 sets - 10 reps  - Supine Shoulder Flexion Extension AAROM with Dowel  - 1 x daily - 7 x weekly - 3 sets - 10 reps  - Seated Scapular Retraction  - 1 x daily - 7 x weekly - 3 sets - 10 reps - 3 second hold  - Seated Upper Trapezius Stretch  - 1 x daily - 7 x weekly - 3 sets - 30 second hold    Charges:   Therex: 25 minutes  Theract: 0 minutes  NMR: 0 minutes  Manual Therapy: 10 minutes  E-stim: 10 minutes  Total Timed Treatment: 35 min  Total Treatment Time: 45 min

## 2024-05-09 ENCOUNTER — OFFICE VISIT (OUTPATIENT)
Dept: PHYSICAL THERAPY | Age: 44
End: 2024-05-09
Attending: NURSE PRACTITIONER
Payer: COMMERCIAL

## 2024-05-09 DIAGNOSIS — M54.2 CERVICALGIA: Primary | ICD-10-CM

## 2024-05-09 DIAGNOSIS — M25.511 ACUTE PAIN OF RIGHT SHOULDER: ICD-10-CM

## 2024-05-09 PROCEDURE — 97140 MANUAL THERAPY 1/> REGIONS: CPT | Performed by: PHYSICAL THERAPIST

## 2024-05-09 PROCEDURE — 97110 THERAPEUTIC EXERCISES: CPT | Performed by: PHYSICAL THERAPIST

## 2024-05-09 PROCEDURE — 97014 ELECTRIC STIMULATION THERAPY: CPT | Performed by: PHYSICAL THERAPIST

## 2024-05-15 ENCOUNTER — OFFICE VISIT (OUTPATIENT)
Dept: PHYSICAL THERAPY | Age: 44
End: 2024-05-15
Attending: NURSE PRACTITIONER

## 2024-05-15 DIAGNOSIS — M54.2 CERVICALGIA: Primary | ICD-10-CM

## 2024-05-15 DIAGNOSIS — M25.511 ACUTE PAIN OF RIGHT SHOULDER: ICD-10-CM

## 2024-05-15 PROCEDURE — 97110 THERAPEUTIC EXERCISES: CPT | Performed by: PHYSICAL THERAPIST

## 2024-05-15 PROCEDURE — 97140 MANUAL THERAPY 1/> REGIONS: CPT | Performed by: PHYSICAL THERAPIST

## 2024-05-15 PROCEDURE — 97530 THERAPEUTIC ACTIVITIES: CPT | Performed by: PHYSICAL THERAPIST

## 2024-05-15 NOTE — PROGRESS NOTES
Diagnosis:   Neck pain (M54.2)    Acute pain of right shoulder (M25.511)       Referring Provider: DONG Pike Date of Evaluation:    4/18/2024    Precautions:  None Next MD visit:   none scheduled  Date of Surgery: n/a   Insurance Primary/Secondary: [employer] / N/A     # Auth Visits: 10            Subjective: Pt reports \"the shoulder is feeling better, my dad and I put a bed frame together over the weekend and I needed to take frequent breaks.\"    Pain: 2/10    Objective: See table below.      Assessment: Pt responded well to therapeutic interventions; however, continues to lack full shoulder ROM in flexion and abduction. Added scapular strengthening today to help with postural re-education and return to ADLs. Increased PROM noted with stretching in all ranges. Reassess reaction to tx next session.      Goals:   (to be met in 10 visits)   Pt will improve cervical AROM flexion to 90 degrees to improve tolerance for looking down to tie shoes   Pt will improve R shoulder AROM to 170+ degrees to improve tolerance for putting dishes into overhead cabinets   Pt will improve cervical AROM rotation to >90 degrees to improve tolerance for turning head to check blind spot while driving  Pt will have improved thoracic PA mobility to WNL to improve cervical ROM as well as promote upright posturing and decreased pain with sitting at the desk and typing for work   Pt will demonstrate improved cervical intrinsic strength to 4+/5 to allow improved cervical stabilization with overhead reaching (10 visits)  Pt will improve postural strength (mid/low trap) to 4/5 to promote improved upright posturing and decreased pain with work-related activities   Pt will be independent and compliant with comprehensive HEP to maintain progress achieved in PT    Plan: Progress skilled PT as tolerated to help with shoulder and cervical ROM.  Date: 4/23/2024  TX#: 2/10 Date: 4/25/2024                TX#: 3/10 Date: 4/30/2024                 TX#: 4/10 Date: 5/2/2024                 TX#: 5/10 Date: 5/7/2024  Tx#: 6/10 Date: 5/9/2024  Tx#: 7/10 Date: 5/15/2024  Tx#: 8/10   AAROM Chest Press: 2x10  Therex: x8'  PROM: cervical in all ranges and R shoulder in flexion Therex: x25'  PROM: cervical in all ranges and R shoulder in flexion  Seated Scapular Retraction: 2x10 with 3\" hold  Pulleys: 2x30 Flexion, IR Therex: x25'  PROM: cervical and R shoulder in all ranges Therex: x25'  PROM: cervical and R shoulder in all ranges Therex: x25'  PROM: cervical and R shoulder in all ranges Therex: x15'  PROM: cervical and R shoulder in all ranges  AAROM Shoulder Flexion: 2x10  Pulleys: 2x30 Flexion, Scaption   AAROM Shoulder Flexion: x10 E-stim: x10' with heat pack  R Cervical & Shoulder  IFC,  Hz, 15 mA E-stim: x10' with heat pack  R Cervical & Shoulder  IFC,  Hz, 15 mA E-stim: x10' with heat pack  R Cervical & Shoulder  IFC,  Hz, 15 mA E-stim: x10' with heat pack  R Cervical & Shoulder  IFC,  Hz, 15 mA E-stim: x10' with heat pack  R Cervical & Shoulder  IFC,  Hz, 17 mA E-stim: X   Chin Tucks: x15 supine      Theract: x20'  Seated Scapular Retraction: 2x10 with 3\" hold  S/L Shoulder ER: 2x10  Rows: 2x10 GTT  Shoulder ER: 1x15 1 YTT  Shoulder IR: 1x15 1 YTT  Posterior Deltoid: 1x15 supine with YTB  D1 Extension: 1x15 supine with YTB   Mulligan Mobilization: x15 cervical rotation to the L      Manual Therapy: x10'  STW: Cervical Paraspinals, Subocciptals, UT/LS, Pec Major/Minor, SCM, Supraspinatus   Upper Trap Stretch: 2x30\" R         Pec Stretch: 2x30\" arms down         Scapular Retraction: x15 with 3\" hold         Pulleys: x15 Flexion         HEP:   Exercises  - Supine Cervical Retraction with Towel  - 1 x daily - 7 x weekly - 3 sets - 10 reps  - Supine Shoulder Flexion Extension AAROM with Dowel  - 1 x daily - 7 x weekly - 3 sets - 10 reps  - Seated Scapular Retraction  - 1 x daily - 7 x weekly - 3 sets - 10 reps - 3 second  hold  - Seated Upper Trapezius Stretch  - 1 x daily - 7 x weekly - 3 sets - 30 second hold    Charges:   Therex: 15 minutes  Theract: 20 minutes  NMR: 0 minutes  Manual Therapy: 10 minutes  E-stim: 0 minutes  Total Timed Treatment: 35 min  Total Treatment Time: 45 min

## 2024-05-17 ENCOUNTER — OFFICE VISIT (OUTPATIENT)
Dept: PHYSICAL THERAPY | Age: 44
End: 2024-05-17
Attending: NURSE PRACTITIONER

## 2024-05-17 DIAGNOSIS — I10 ESSENTIAL HYPERTENSION: ICD-10-CM

## 2024-05-17 DIAGNOSIS — M54.2 CERVICALGIA: Primary | ICD-10-CM

## 2024-05-17 DIAGNOSIS — M25.511 ACUTE PAIN OF RIGHT SHOULDER: ICD-10-CM

## 2024-05-17 PROCEDURE — 97110 THERAPEUTIC EXERCISES: CPT | Performed by: PHYSICAL THERAPIST

## 2024-05-17 PROCEDURE — 97140 MANUAL THERAPY 1/> REGIONS: CPT | Performed by: PHYSICAL THERAPIST

## 2024-05-17 PROCEDURE — 97530 THERAPEUTIC ACTIVITIES: CPT | Performed by: PHYSICAL THERAPIST

## 2024-05-17 NOTE — PROGRESS NOTES
Diagnosis:   Neck pain (M54.2)    Acute pain of right shoulder (M25.511)       Referring Provider: DONG Pike Date of Evaluation:    4/18/2024    Precautions:  None Next MD visit:   none scheduled  Date of Surgery: n/a   Insurance Primary/Secondary: [employer] / N/A     # Auth Visits: 10            Subjective: Pt reports \"the shoulder is feeling sore today, was sore on Wednesday after therapy but no pain - the shoulder still pulls going across the body.\"    Pain: 2/10 (soreness)    Objective: See table below.      Assessment: Pt responded well to therapeutic interventions with no increase in pain. Pt continues to lack functional shoulder ROM. UBE added for warm-up and increasing ROM. Also added D2 flexion that the pt responded well to in supine. Shoulder adduction still limited due to pulling.      Goals:   (to be met in 10 visits)   Pt will improve cervical AROM flexion to 90 degrees to improve tolerance for looking down to tie shoes   Pt will improve R shoulder AROM to 170+ degrees to improve tolerance for putting dishes into overhead cabinets   Pt will improve cervical AROM rotation to >90 degrees to improve tolerance for turning head to check blind spot while driving  Pt will have improved thoracic PA mobility to WNL to improve cervical ROM as well as promote upright posturing and decreased pain with sitting at the desk and typing for work   Pt will demonstrate improved cervical intrinsic strength to 4+/5 to allow improved cervical stabilization with overhead reaching (10 visits)  Pt will improve postural strength (mid/low trap) to 4/5 to promote improved upright posturing and decreased pain with work-related activities   Pt will be independent and compliant with comprehensive HEP to maintain progress achieved in PT    Plan: Progress skilled PT as tolerated to help with shoulder and cervical ROM.  Date: 4/23/2024  TX#: 2/10 Date: 4/25/2024                TX#: 3/10 Date: 4/30/2024                 TX#: 4/10 Date: 5/2/2024                 TX#: 5/10 Date: 5/7/2024  Tx#: 6/10 Date: 5/9/2024  Tx#: 7/10 Date: 5/15/2024  Tx#: 8/10 Date: 5/17/2024  Tx#: 9/10   AAROM Chest Press: 2x10  Therex: x8'  PROM: cervical in all ranges and R shoulder in flexion Therex: x25'  PROM: cervical in all ranges and R shoulder in flexion  Seated Scapular Retraction: 2x10 with 3\" hold  Pulleys: 2x30 Flexion, IR Therex: x25'  PROM: cervical and R shoulder in all ranges Therex: x25'  PROM: cervical and R shoulder in all ranges Therex: x25'  PROM: cervical and R shoulder in all ranges Therex: x15'  PROM: cervical and R shoulder in all ranges  AAROM Shoulder Flexion: 2x10  Pulleys: 2x30 Flexion, Scaption Therex: x10'  PROM: cervical and R shoulder in all ranges  UBE: x4' (2' fwd, 2' bkwrd)  AAROM Shoulder Flexion: 2x10  Pulleys: 2x30 Flexion, Scaption   AAROM Shoulder Flexion: x10 E-stim: x10' with heat pack  R Cervical & Shoulder  IFC,  Hz, 15 mA E-stim: x10' with heat pack  R Cervical & Shoulder  IFC,  Hz, 15 mA E-stim: x10' with heat pack  R Cervical & Shoulder  IFC,  Hz, 15 mA E-stim: x10' with heat pack  R Cervical & Shoulder  IFC,  Hz, 15 mA E-stim: x10' with heat pack  R Cervical & Shoulder  IFC,  Hz, 17 mA E-stim: X E-stim: X   Chin Tucks: x15 supine      Theract: x20'  Seated Scapular Retraction: 2x10 with 3\" hold  S/L Shoulder ER: 2x10  Rows: 2x10 GTT  Shoulder ER: 1x15 1 YTT  Shoulder IR: 1x15 1 YTT  Posterior Deltoid: 1x15 supine with YTB  D1 Extension: 1x15 supine with YTB Theract: x25'  Seated Scapular Retraction: X  S/L Shoulder ER: 2x10  Rows: 2x10 GTT  Shoulder ER: 1x15 1 YTT  Shoulder IR: X  Posterior Deltoid: 1x15 with YTB  D1 Extension: 1x15 with YTT  D2 Flexion: 1x15 supine    Mulligan Mobilization: x15 cervical rotation to the L      Manual Therapy: x10'  STW: Cervical Paraspinals, Subocciptals, UT/LS, Pec Major/Minor, SCM, Supraspinatus Manual Therapy: x10'  STW: Cervical Paraspinals,  Subocciptals, UT/LS, Pec Major/Minor, SCM, Supraspinatus   Upper Trap Stretch: 2x30\" R          Pec Stretch: 2x30\" arms down          Scapular Retraction: x15 with 3\" hold          Pulleys: x15 Flexion          HEP:   Exercises  - Supine Cervical Retraction with Towel  - 1 x daily - 7 x weekly - 3 sets - 10 reps  - Supine Shoulder Flexion Extension AAROM with Dowel  - 1 x daily - 7 x weekly - 3 sets - 10 reps  - Seated Scapular Retraction  - 1 x daily - 7 x weekly - 3 sets - 10 reps - 3 second hold  - Seated Upper Trapezius Stretch  - 1 x daily - 7 x weekly - 3 sets - 30 second hold    Charges:   Therex: 10 minutes  Theract: 25 minutes  NMR: 0 minutes  Manual Therapy: 10 minutes  E-stim: 0 minutes  Total Timed Treatment: 45 min  Total Treatment Time: 45 min

## 2024-05-21 ENCOUNTER — OFFICE VISIT (OUTPATIENT)
Dept: PHYSICAL THERAPY | Age: 44
End: 2024-05-21
Attending: NURSE PRACTITIONER

## 2024-05-21 DIAGNOSIS — M54.2 CERVICALGIA: Primary | ICD-10-CM

## 2024-05-21 DIAGNOSIS — M25.511 ACUTE PAIN OF RIGHT SHOULDER: ICD-10-CM

## 2024-05-21 PROCEDURE — 97110 THERAPEUTIC EXERCISES: CPT | Performed by: PHYSICAL THERAPIST

## 2024-05-21 PROCEDURE — 97530 THERAPEUTIC ACTIVITIES: CPT | Performed by: PHYSICAL THERAPIST

## 2024-05-21 PROCEDURE — 97140 MANUAL THERAPY 1/> REGIONS: CPT | Performed by: PHYSICAL THERAPIST

## 2024-05-21 RX ORDER — SPIRONOLACTONE 25 MG/1
25 TABLET ORAL EVERY MORNING
Qty: 90 TABLET | Refills: 1 | Status: SHIPPED | OUTPATIENT
Start: 2024-05-21

## 2024-05-21 NOTE — TELEPHONE ENCOUNTER
Please review. Rx failed/no protocol.    Requested Prescriptions   Pending Prescriptions Disp Refills    SPIRONOLACTONE 25 MG Oral Tab [Pharmacy Med Name: SPIRONOLACTONE 25MG TABLETS] 90 tablet 1     Sig: TAKE 1 TABLET(25 MG) BY MOUTH EVERY MORNING       Hypertension Medications Protocol Failed - 5/17/2024 11:44 AM        Failed - Last BP reading less than 140/90     BP Readings from Last 1 Encounters:   04/16/24 142/90               Passed - CMP or BMP in past 12 months        Passed - In person appointment or virtual visit in the past 12 mos or appointment in next 3 mos     Recent Outpatient Visits              Today Cervicalgia    [facility] [place] KarpuziJane, PT    Office Visit    4 days ago Cervicalgia    [facility] [place] KarpuziJane, PT    Office Visit    6 days ago Cervicalgia    [facility] [place] KarpuziJane, PT    Office Visit    1 week ago Cervicalgia    [facility] [place] KarpuziJane, PT    Office Visit    2 weeks ago Cervicalgia    [facility] [place] KarpuziJane, PT    Office Visit          Future Appointments         Provider Department Appt Notes    In 3 weeks Jane Loving, PT [facility] in [place]     In 1 month [address]                     Passed - EGFRCR or GFRAA > 50     GFR Evaluation  EGFRCR: 72 , resulted on 10/26/2023               Future Appointments         Provider Department Appt Notes    In 3 weeks Jane Loving, PT [facility] in [place]     In 1 month [address]           Recent Outpatient Visits              Today Cervicalgia    [facility] [place] KarpuziJane, PT    Office Visit    4 days ago Cervicalgia    [facility] [place] KarpuziJane, PT    Office Visit    6 days ago Cervicalgia    [facility] [place] Karpuzi,  Jane, PT    Office Visit    1 week ago Cervicalgia    [facility] in [place] KarpuziJane, PT    Office Visit    2 weeks ago Cindi    [facility] [place] KarpuziJane, PT    Office Visit

## 2024-05-21 NOTE — PROGRESS NOTES
Diagnosis:   Neck pain (M54.2)    Acute pain of right shoulder (M25.511)       Referring Provider: DONG Pike Date of Evaluation:    4/18/2024    Precautions:  None Next MD visit:   none scheduled  Date of Surgery: n/a   Insurance Primary/Secondary: [employer] / N/A     # Auth Visits: 10            Subjective: Pt reports \"the shoulder is feeling sore today, still pulling going across the body, I did 4 dances over the weekend and was feeling fatigued and sore.\"    Pain: 3-4/10 (soreness)    Objective: See table below.      Assessment: Pt responded well to therapeutic interventions with no increase in pain. Added shoulder IR to neutral with some clicking noted. Updated HEP to help pt with building up strength for the next couple of weeks - see HEP.      Goals:   (to be met in 10 visits)   Pt will improve cervical AROM flexion to 90 degrees to improve tolerance for looking down to tie shoes   Pt will improve R shoulder AROM to 170+ degrees to improve tolerance for putting dishes into overhead cabinets   Pt will improve cervical AROM rotation to >90 degrees to improve tolerance for turning head to check blind spot while driving  Pt will have improved thoracic PA mobility to WNL to improve cervical ROM as well as promote upright posturing and decreased pain with sitting at the desk and typing for work   Pt will demonstrate improved cervical intrinsic strength to 4+/5 to allow improved cervical stabilization with overhead reaching (10 visits)  Pt will improve postural strength (mid/low trap) to 4/5 to promote improved upright posturing and decreased pain with work-related activities   Pt will be independent and compliant with comprehensive HEP to maintain progress achieved in PT    Plan: Progress skilled PT as tolerated to help with shoulder and cervical ROM.  Date: 4/23/2024  TX#: 2/10 Date: 4/25/2024                TX#: 3/10 Date: 4/30/2024                TX#: 4/10 Date: 5/2/2024                  TX#: 5/10 Date: 5/7/2024  Tx#: 6/10 Date: 5/9/2024  Tx#: 7/10 Date: 5/15/2024  Tx#: 8/10 Date: 5/17/2024  Tx#: 9/10 Date: 5/21/2024  Tx#: 10/10   AAROM Chest Press: 2x10  Therex: x8'  PROM: cervical in all ranges and R shoulder in flexion Therex: x25'  PROM: cervical in all ranges and R shoulder in flexion  Seated Scapular Retraction: 2x10 with 3\" hold  Pulleys: 2x30 Flexion, IR Therex: x25'  PROM: cervical and R shoulder in all ranges Therex: x25'  PROM: cervical and R shoulder in all ranges Therex: x25'  PROM: cervical and R shoulder in all ranges Therex: x15'  PROM: cervical and R shoulder in all ranges  AAROM Shoulder Flexion: 2x10  Pulleys: 2x30 Flexion, Scaption Therex: x10'  PROM: cervical and R shoulder in all ranges  UBE: x4' (2' fwd, 2' bkwrd)  AAROM Shoulder Flexion: 2x10  Pulleys: 2x30 Flexion, Scaption Therex: x10'  PROM: cervical and R shoulder in all ranges  UBE: x4' (2' fwd, 2' bkwrd)  AAROM Shoulder Flexion: 2x10  Pulleys: 2x30 Flexion, Scaption  Pec Stretch: 3x30\" 3-way   AAROM Shoulder Flexion: x10 E-stim: x10' with heat pack  R Cervical & Shoulder  IFC,  Hz, 15 mA E-stim: x10' with heat pack  R Cervical & Shoulder  IFC,  Hz, 15 mA E-stim: x10' with heat pack  R Cervical & Shoulder  IFC,  Hz, 15 mA E-stim: x10' with heat pack  R Cervical & Shoulder  IFC,  Hz, 15 mA E-stim: x10' with heat pack  R Cervical & Shoulder  IFC,  Hz, 17 mA E-stim: X E-stim: X E-stim: X   Chin Tucks: x15 supine      Theract: x20'  Seated Scapular Retraction: 2x10 with 3\" hold  S/L Shoulder ER: 2x10  Rows: 2x10 GTT  Shoulder ER: 1x15 1 YTT  Shoulder IR: 1x15 1 YTT  Posterior Deltoid: 1x15 supine with YTB  D1 Extension: 1x15 supine with YTB Theract: x25'  Seated Scapular Retraction: X  S/L Shoulder ER: 2x10  Rows: 2x10 GTT  Shoulder ER: 1x15 1 YTT  Shoulder IR: X  Posterior Deltoid: 1x15 with YTB  D1 Extension: 1x15 with YTT  D2 Flexion: 1x15 supine  Theract: x25'  Seated Scapular Retraction:  X  S/L Shoulder ER: 2x10  Rows: 2x10 GTT  Shoulder ER: 2x10 1 YTT  Shoulder IR: 1x15 1 YTT to neutral  Posterior Deltoid: 1x15 with YTB  D1 Extension: 1x15 with YTT  D2 Flexion: 1x15 supine YTB   Mulligan Mobilization: x15 cervical rotation to the L      Manual Therapy: x10'  STW: Cervical Paraspinals, Subocciptals, UT/LS, Pec Major/Minor, SCM, Supraspinatus Manual Therapy: x10'  STW: Cervical Paraspinals, Subocciptals, UT/LS, Pec Major/Minor, SCM, Supraspinatus Manual Therapy: x10'  STW: Cervical Paraspinals, Subocciptals, UT/LS, Pec Major/Minor, SCM, Supraspinatus   Upper Trap Stretch: 2x30\" R           Pec Stretch: 2x30\" arms down           Scapular Retraction: x15 with 3\" hold           Pulleys: x15 Flexion           HEP:   Exercises  - Supine Cervical Retraction with Towel  - 1 x daily - 7 x weekly - 3 sets - 10 reps  - Supine Shoulder Flexion Extension AAROM with Dowel  - 1 x daily - 7 x weekly - 3 sets - 10 reps  - Seated Scapular Retraction  - 1 x daily - 7 x weekly - 3 sets - 10 reps - 3 second hold  - Seated Upper Trapezius Stretch  - 1 x daily - 7 x weekly - 3 sets - 30 second hold  - Sidelying Shoulder ER with Towel and Dumbbell  - 1 x daily - 7 x weekly - 3 sets - 10 reps  - Sidelying Shoulder Abduction Palm Forward  - 1 x daily - 7 x weekly - 3 sets - 10 reps  - Standing Row with Anchored Resistance  - 1 x daily - 7 x weekly - 3 sets - 10 reps  - Shoulder External Rotation with Anchored Resistance  - 1 x daily - 7 x weekly - 3 sets - 10 reps  - Standing Shoulder Horizontal Abduction with Resistance  - 1 x daily - 7 x weekly - 3 sets - 10 reps  - Standing Single Arm Shoulder PNF D1 Extension with Anchored Resistance  - 1 x daily - 7 x weekly - 3 sets - 10 reps  - Supine PNF D2 Flexion with Resistance  - 1 x daily - 7 x weekly - 3 sets - 10 reps  Charges:   Therex: 10 minutes  Theract: 25 minutes  NMR: 0 minutes  Manual Therapy: 10 minutes  E-stim: 0 minutes  Total Timed Treatment: 45 min  Total  Treatment Time: 45 min

## 2024-06-11 ENCOUNTER — TELEPHONE (OUTPATIENT)
Dept: INTERNAL MEDICINE CLINIC | Facility: CLINIC | Age: 44
End: 2024-06-11

## 2024-06-11 NOTE — TELEPHONE ENCOUNTER
Pt rec'd last depo provera injection on 4/16/24.  Next depo provera injection window is July 2nd - July 16th.  Pt has standing order in system.  Pls call pt to schedule nurse visit for depo provera injection within window timeframe.

## 2024-06-14 ENCOUNTER — OFFICE VISIT (OUTPATIENT)
Dept: PHYSICAL THERAPY | Age: 44
End: 2024-06-14
Attending: NURSE PRACTITIONER
Payer: COMMERCIAL

## 2024-06-14 DIAGNOSIS — M54.2 CERVICALGIA: Primary | ICD-10-CM

## 2024-06-14 DIAGNOSIS — M25.511 ACUTE PAIN OF RIGHT SHOULDER: ICD-10-CM

## 2024-06-14 PROCEDURE — 97110 THERAPEUTIC EXERCISES: CPT | Performed by: PHYSICAL THERAPIST

## 2024-06-14 PROCEDURE — 97140 MANUAL THERAPY 1/> REGIONS: CPT | Performed by: PHYSICAL THERAPIST

## 2024-06-14 PROCEDURE — 97530 THERAPEUTIC ACTIVITIES: CPT | Performed by: PHYSICAL THERAPIST

## 2024-06-14 NOTE — PROGRESS NOTES
Diagnosis:   Neck pain (M54.2)    Acute pain of right shoulder (M25.511)       Referring Provider: DONG Pike Date of Evaluation:    4/18/2024    Precautions:  None Next MD visit:   none scheduled  Date of Surgery: n/a   Insurance Primary/Secondary: [employer] / N/A     # Auth Visits: 10            Subjective: Pt reports \"I started dancing again, sometimes the shoulder and neck are sore and sometimes they are okay - I still have to do my hair in steps - it is not 100% but it is getting there.\"    Pain: 0/10 (soreness)    Objective: See table below.      Assessment: Pt responded well to therapeutic interventions with some discomfort in proximal bicep tendon with shoulder IR. Pt educated on lacrosse ball massage and working on preventing overcompensation of the UT musculature.       Goals:   (to be met in 10 visits)   Pt will improve cervical AROM flexion to 90 degrees to improve tolerance for looking down to tie shoes   Pt will improve R shoulder AROM to 170+ degrees to improve tolerance for putting dishes into overhead cabinets   Pt will improve cervical AROM rotation to >90 degrees to improve tolerance for turning head to check blind spot while driving  Pt will have improved thoracic PA mobility to WNL to improve cervical ROM as well as promote upright posturing and decreased pain with sitting at the desk and typing for work   Pt will demonstrate improved cervical intrinsic strength to 4+/5 to allow improved cervical stabilization with overhead reaching (10 visits)  Pt will improve postural strength (mid/low trap) to 4/5 to promote improved upright posturing and decreased pain with work-related activities   Pt will be independent and compliant with comprehensive HEP to maintain progress achieved in PT    Plan: Progress skilled PT as tolerated to help with shoulder and cervical ROM.  Date: 4/23/2024  TX#: 2/10 Date: 4/25/2024                TX#: 3/10 Date: 4/30/2024                TX#: 4/10  Date: 5/2/2024                 TX#: 5/10 Date: 5/7/2024  Tx#: 6/10 Date: 5/9/2024  Tx#: 7/10 Date: 5/15/2024  Tx#: 8/10 Date: 5/17/2024  Tx#: 9/10 Date: 5/21/2024  Tx#: 10/10 Date: 6/14/2024  Tx#: 11/10   AAROM Chest Press: 2x10  Therex: x8'  PROM: cervical in all ranges and R shoulder in flexion Therex: x25'  PROM: cervical in all ranges and R shoulder in flexion  Seated Scapular Retraction: 2x10 with 3\" hold  Pulleys: 2x30 Flexion, IR Therex: x25'  PROM: cervical and R shoulder in all ranges Therex: x25'  PROM: cervical and R shoulder in all ranges Therex: x25'  PROM: cervical and R shoulder in all ranges Therex: x15'  PROM: cervical and R shoulder in all ranges  AAROM Shoulder Flexion: 2x10  Pulleys: 2x30 Flexion, Scaption Therex: x10'  PROM: cervical and R shoulder in all ranges  UBE: x4' (2' fwd, 2' bkwrd)  AAROM Shoulder Flexion: 2x10  Pulleys: 2x30 Flexion, Scaption Therex: x10'  PROM: cervical and R shoulder in all ranges  UBE: x4' (2' fwd, 2' bkwrd)  AAROM Shoulder Flexion: 2x10  Pulleys: 2x30 Flexion, Scaption  Pec Stretch: 3x30\" 3-way Therex: x10'  PROM: cervical and R shoulder in all ranges  UBE: x4' (2' fwd, 2' bkwrd)  AAROM Shoulder Flexion: 2x10  Pulleys: 2x30 Flexion, Scaption  Pec Stretch: 3x30\" 3-way  Lacrosse Ball Massage: x1'   AAROM Shoulder Flexion: x10 E-stim: x10' with heat pack  R Cervical & Shoulder  IFC,  Hz, 15 mA E-stim: x10' with heat pack  R Cervical & Shoulder  IFC,  Hz, 15 mA E-stim: x10' with heat pack  R Cervical & Shoulder  IFC,  Hz, 15 mA E-stim: x10' with heat pack  R Cervical & Shoulder  IFC,  Hz, 15 mA E-stim: x10' with heat pack  R Cervical & Shoulder  IFC,  Hz, 17 mA E-stim: X E-stim: X E-stim: X E-stim: X   Chin Tucks: x15 supine      Theract: x20'  Seated Scapular Retraction: 2x10 with 3\" hold  S/L Shoulder ER: 2x10  Rows: 2x10 GTT  Shoulder ER: 1x15 1 YTT  Shoulder IR: 1x15 1 YTT  Posterior Deltoid: 1x15 supine with YTB  D1 Extension: 1x15  supine with YTB Theract: x25'  Seated Scapular Retraction: X  S/L Shoulder ER: 2x10  Rows: 2x10 GTT  Shoulder ER: 1x15 1 YTT  Shoulder IR: X  Posterior Deltoid: 1x15 with YTB  D1 Extension: 1x15 with YTT  D2 Flexion: 1x15 supine  Theract: x25'  Seated Scapular Retraction: X  S/L Shoulder ER: 2x10  Rows: 2x10 GTT  Shoulder ER: 2x10 1 YTT  Shoulder IR: 1x15 1 YTT to neutral  Posterior Deltoid: 1x15 with YTB  D1 Extension: 1x15 with YTT  D2 Flexion: 1x15 supine YTB Theract: x25'  Seated Scapular Retraction: X  S/L Shoulder ER: 2x10 1#  Rows: 2x10 GTT  Shoulder ER: 2x10 1 YTT  Shoulder IR: 1x15 1 YTT to neutral  Posterior Deltoid: 1x15 with YTB  D1 Extension/D2 Flexion: 1x15 with RTT   Mulligan Mobilization: x15 cervical rotation to the L      Manual Therapy: x10'  STW: Cervical Paraspinals, Subocciptals, UT/LS, Pec Major/Minor, SCM, Supraspinatus Manual Therapy: x10'  STW: Cervical Paraspinals, Subocciptals, UT/LS, Pec Major/Minor, SCM, Supraspinatus Manual Therapy: x10'  STW: Cervical Paraspinals, Subocciptals, UT/LS, Pec Major/Minor, SCM, Supraspinatus Manual Therapy: x10'  STW: Cervical Paraspinals, Subocciptals, UT/LS, Pec Major/Minor, SCM, Supraspinatus   Upper Trap Stretch: 2x30\" R            Pec Stretch: 2x30\" arms down            Scapular Retraction: x15 with 3\" hold            Pulleys: x15 Flexion            HEP:   Exercises  - Supine Cervical Retraction with Towel  - 1 x daily - 7 x weekly - 3 sets - 10 reps  - Supine Shoulder Flexion Extension AAROM with Dowel  - 1 x daily - 7 x weekly - 3 sets - 10 reps  - Seated Scapular Retraction  - 1 x daily - 7 x weekly - 3 sets - 10 reps - 3 second hold  - Seated Upper Trapezius Stretch  - 1 x daily - 7 x weekly - 3 sets - 30 second hold  - Sidelying Shoulder ER with Towel and Dumbbell  - 1 x daily - 7 x weekly - 3 sets - 10 reps  - Sidelying Shoulder Abduction Palm Forward  - 1 x daily - 7 x weekly - 3 sets - 10 reps  - Standing Row with Anchored Resistance  - 1 x  daily - 7 x weekly - 3 sets - 10 reps  - Shoulder External Rotation with Anchored Resistance  - 1 x daily - 7 x weekly - 3 sets - 10 reps  - Standing Shoulder Horizontal Abduction with Resistance  - 1 x daily - 7 x weekly - 3 sets - 10 reps  - Standing Single Arm Shoulder PNF D1 Extension with Anchored Resistance  - 1 x daily - 7 x weekly - 3 sets - 10 reps  - Supine PNF D2 Flexion with Resistance  - 1 x daily - 7 x weekly - 3 sets - 10 reps  Charges:   Therex: 10 minutes  Theract: 25 minutes  NMR: 0 minutes  Manual Therapy: 10 minutes  E-stim: 0 minutes  Total Timed Treatment: 45 min  Total Treatment Time: 45 min

## 2024-06-29 ENCOUNTER — LAB ENCOUNTER (OUTPATIENT)
Dept: LAB | Facility: HOSPITAL | Age: 44
End: 2024-06-29
Attending: INTERNAL MEDICINE
Payer: COMMERCIAL

## 2024-06-29 ENCOUNTER — HOSPITAL ENCOUNTER (OUTPATIENT)
Dept: MAMMOGRAPHY | Facility: HOSPITAL | Age: 44
Discharge: HOME | End: 2024-06-29
Attending: INTERNAL MEDICINE
Payer: COMMERCIAL

## 2024-06-29 DIAGNOSIS — Z12.31 SCREENING MAMMOGRAM, ENCOUNTER FOR: ICD-10-CM

## 2024-06-29 LAB
ALBUMIN SERPL-MCNC: 4.5 G/DL (ref 3.2–4.8)
ALBUMIN/GLOB SERPL: 1.7 {RATIO} (ref 1–2)
ALP LIVER SERPL-CCNC: 64 U/L
ALT SERPL-CCNC: 21 U/L
ANION GAP SERPL CALC-SCNC: 7 MMOL/L (ref 0–18)
AST SERPL-CCNC: 18 U/L (ref ?–34)
BASOPHILS # BLD AUTO: 0.05 X10(3) UL (ref 0–0.2)
BASOPHILS NFR BLD AUTO: 0.8 %
BILIRUB SERPL-MCNC: 0.3 MG/DL (ref 0.3–1.2)
BILIRUB UR QL: NEGATIVE
BUN BLD-MCNC: 12 MG/DL (ref 9–23)
BUN/CREAT SERPL: 12.8 (ref 10–20)
CALCIUM BLD-MCNC: 10 MG/DL (ref 8.7–10.4)
CHLORIDE SERPL-SCNC: 107 MMOL/L (ref 98–112)
CHOLEST SERPL-MCNC: 228 MG/DL (ref ?–200)
CLARITY UR: CLEAR
CO2 SERPL-SCNC: 27 MMOL/L (ref 21–32)
CREAT BLD-MCNC: 0.94 MG/DL
DEPRECATED RDW RBC AUTO: 37 FL (ref 35.1–46.3)
EGFRCR SERPLBLD CKD-EPI 2021: 76 ML/MIN/1.73M2 (ref 60–?)
EOSINOPHIL # BLD AUTO: 0.16 X10(3) UL (ref 0–0.7)
EOSINOPHIL NFR BLD AUTO: 2.4 %
ERYTHROCYTE [DISTWIDTH] IN BLOOD BY AUTOMATED COUNT: 13.8 % (ref 11–15)
EST. AVERAGE GLUCOSE BLD GHB EST-MCNC: 114 MG/DL (ref 68–126)
FASTING PATIENT LIPID ANSWER: YES
FASTING STATUS PATIENT QL REPORTED: YES
GLOBULIN PLAS-MCNC: 2.6 G/DL (ref 2–3.5)
GLUCOSE BLD-MCNC: 97 MG/DL (ref 70–99)
GLUCOSE UR-MCNC: NORMAL MG/DL
HBA1C MFR BLD: 5.6 % (ref ?–5.7)
HCT VFR BLD AUTO: 36.7 %
HDLC SERPL-MCNC: 53 MG/DL (ref 40–59)
HGB BLD-MCNC: 11.5 G/DL
HGB UR QL STRIP.AUTO: NEGATIVE
IMM GRANULOCYTES # BLD AUTO: 0.02 X10(3) UL (ref 0–1)
IMM GRANULOCYTES NFR BLD: 0.3 %
KETONES UR-MCNC: NEGATIVE MG/DL
LDLC SERPL CALC-MCNC: 163 MG/DL (ref ?–100)
LEUKOCYTE ESTERASE UR QL STRIP.AUTO: NEGATIVE
LYMPHOCYTES # BLD AUTO: 2.45 X10(3) UL (ref 1–4)
LYMPHOCYTES NFR BLD AUTO: 36.9 %
MCH RBC QN AUTO: 23.5 PG (ref 26–34)
MCHC RBC AUTO-ENTMCNC: 31.3 G/DL (ref 31–37)
MCV RBC AUTO: 75.1 FL
MONOCYTES # BLD AUTO: 0.56 X10(3) UL (ref 0.1–1)
MONOCYTES NFR BLD AUTO: 8.4 %
NEUTROPHILS # BLD AUTO: 3.4 X10 (3) UL (ref 1.5–7.7)
NEUTROPHILS # BLD AUTO: 3.4 X10(3) UL (ref 1.5–7.7)
NEUTROPHILS NFR BLD AUTO: 51.2 %
NITRITE UR QL STRIP.AUTO: NEGATIVE
NONHDLC SERPL-MCNC: 175 MG/DL (ref ?–130)
OSMOLALITY SERPL CALC.SUM OF ELEC: 292 MOSM/KG (ref 275–295)
PH UR: 5.5 [PH] (ref 5–8)
PLATELET # BLD AUTO: 297 10(3)UL (ref 150–450)
POTASSIUM SERPL-SCNC: 3.5 MMOL/L (ref 3.5–5.1)
PROT SERPL-MCNC: 7.1 G/DL (ref 5.7–8.2)
PROT UR-MCNC: NEGATIVE MG/DL
RBC # BLD AUTO: 4.89 X10(6)UL
SODIUM SERPL-SCNC: 141 MMOL/L (ref 136–145)
SP GR UR STRIP: 1.02 (ref 1–1.03)
TRIGL SERPL-MCNC: 72 MG/DL (ref 30–149)
TSI SER-ACNC: 3.52 MIU/ML (ref 0.55–4.78)
UROBILINOGEN UR STRIP-ACNC: NORMAL
VLDLC SERPL CALC-MCNC: 14 MG/DL (ref 0–30)
WBC # BLD AUTO: 6.6 X10(3) UL (ref 4–11)

## 2024-06-29 PROCEDURE — 84443 ASSAY THYROID STIM HORMONE: CPT | Performed by: INTERNAL MEDICINE

## 2024-06-29 PROCEDURE — 77063 BREAST TOMOSYNTHESIS BI: CPT | Performed by: INTERNAL MEDICINE

## 2024-06-29 PROCEDURE — 80061 LIPID PANEL: CPT | Performed by: INTERNAL MEDICINE

## 2024-06-29 PROCEDURE — 81003 URINALYSIS AUTO W/O SCOPE: CPT | Performed by: INTERNAL MEDICINE

## 2024-06-29 PROCEDURE — 83036 HEMOGLOBIN GLYCOSYLATED A1C: CPT | Performed by: INTERNAL MEDICINE

## 2024-06-29 PROCEDURE — 36415 COLL VENOUS BLD VENIPUNCTURE: CPT | Performed by: INTERNAL MEDICINE

## 2024-06-29 PROCEDURE — 77067 SCR MAMMO BI INCL CAD: CPT | Performed by: INTERNAL MEDICINE

## 2024-06-29 PROCEDURE — 85025 COMPLETE CBC W/AUTO DIFF WBC: CPT | Performed by: INTERNAL MEDICINE

## 2024-06-29 PROCEDURE — 80053 COMPREHEN METABOLIC PANEL: CPT | Performed by: INTERNAL MEDICINE

## 2024-07-11 ENCOUNTER — OFFICE VISIT (OUTPATIENT)
Dept: INTERNAL MEDICINE CLINIC | Facility: CLINIC | Age: 44
End: 2024-07-11

## 2024-07-11 VITALS
BODY MASS INDEX: 43.02 KG/M2 | WEIGHT: 252 LBS | SYSTOLIC BLOOD PRESSURE: 131 MMHG | HEIGHT: 64 IN | DIASTOLIC BLOOD PRESSURE: 85 MMHG | HEART RATE: 90 BPM

## 2024-07-11 DIAGNOSIS — D50.8 OTHER IRON DEFICIENCY ANEMIA: ICD-10-CM

## 2024-07-11 DIAGNOSIS — D64.9 ANEMIA, UNSPECIFIED TYPE: ICD-10-CM

## 2024-07-11 DIAGNOSIS — Z30.42 DEPO-PROVERA CONTRACEPTIVE STATUS: Primary | ICD-10-CM

## 2024-07-11 DIAGNOSIS — L91.8 ACQUIRED SKIN TAG: ICD-10-CM

## 2024-07-11 DIAGNOSIS — T75.3XXA MOTION SICKNESS, INITIAL ENCOUNTER: ICD-10-CM

## 2024-07-11 DIAGNOSIS — E53.8 VITAMIN B12 DEFICIENCY: ICD-10-CM

## 2024-07-11 DIAGNOSIS — Z12.11 SCREEN FOR COLON CANCER: ICD-10-CM

## 2024-07-11 RX ORDER — SCOLOPAMINE TRANSDERMAL SYSTEM 1 MG/1
1 PATCH, EXTENDED RELEASE TRANSDERMAL
Qty: 9 PATCH | Refills: 1 | Status: SHIPPED | OUTPATIENT
Start: 2024-07-11

## 2024-07-11 RX ADMIN — MEDROXYPROGESTERONE ACETATE 150 MG: 150 INJECTION, SUSPENSION INTRAMUSCULAR at 17:08:00

## 2024-07-11 NOTE — PROGRESS NOTES
HPI:    Patient ID: Oscar Hair is a 43 year old female.  Patient presents with    Chief Complaint   Patient presents with    Injection     Here to obtain Depo Provera Injection.  Last injection given on 4/16/24.    Motion Sickness     Would like to discuss her motion sickness and how to treat it cause she would like to start traveling on planes or in a passenger in the car.         Patient presents today for  depo provera shot and  has motion sickness if  sitting in car  back seat nausea and  feels sick to stomach feels off balance  HTN bp check     Patient states she is taking her medications regularly amlodipine 5 mg and spironolactone 25 mg every day   Patient states otherwise she is doing well denies any chest pain shortness of breath chest pain or palpitation        Hypertension  This is a chronic problem. The current episode started more than 1 year ago. The problem has been gradually improving since onset. The problem is controlled. Pertinent negatives include no chest pain, palpitations, peripheral edema, PND or shortness of breath. Risk factors for coronary artery disease include obesity. Past treatments include lifestyle changes, diuretics and calcium channel blockers. The current treatment provides significant improvement.       Review of Systems   HENT:  Negative for ear pain.    Eyes:  Negative for pain and redness.   Respiratory:  Negative for shortness of breath and wheezing.    Cardiovascular:  Negative for chest pain, palpitations, leg swelling and PND.   Gastrointestinal:  Negative for blood in stool, constipation and diarrhea.        Heartburns stable  with medications for now    Genitourinary:  Negative for frequency.   Musculoskeletal:  Negative for back pain.   Skin:  Negative for pallor.             Neurological:  Negative for dizziness.        Motion sickness  when traveling in car back seat   Planing to travel by plain    Psychiatric/Behavioral:  Negative for confusion. The patient  is not nervous/anxious.             Current Outpatient Medications   Medication Sig Dispense Refill    spironolactone 25 MG Oral Tab Take 1 tablet (25 mg total) by mouth every morning. 90 tablet 1    fluticasone propionate 50 MCG/ACT Nasal Suspension 2 sprays by Each Nare route daily. 1 each 0    amLODIPine 5 MG Oral Tab Take 1 tablet (5 mg total) by mouth every morning. 90 tablet 3    Omeprazole 40 MG Oral Capsule Delayed Release Take 1 capsule (40 mg total) by mouth daily. 90 capsule 3    PREVIDENT 5000 SENSITIVE 1.1-5 % Dental Paste USE TWICE DAILY IN PLACE OF REGULAR TOOTHPASTE.      APPLE CIDER VINEGAR OR Take by mouth.      Ascorbic Acid (VITAMIN C ADULT GUMMIES OR) Take 2 tablets by mouth daily.      Vitamin B-12 1000 MCG Oral Tab Take 1 tablet by mouth daily      DiphenhydrAMINE HCl (BENADRYL ALLERGY OR) Take by mouth daily as needed.      Multiple Vitamins-Minerals (MULTIVITAMIN GUMMIES WOMENS OR) Take by mouth daily.      loratadine 10 MG Oral Tab Take 1 tablet (10 mg total) by mouth daily.       Allergies:  Allergies   Allergen Reactions    Azithromycin NAUSEA AND VOMITING    Ciprofloxacin NAUSEA AND VOMITING    Sulfasalazine PAIN      PHYSICAL EXAM:   Physical Exam  Vitals and nursing note reviewed.   Constitutional:       General: She is not in acute distress.     Appearance: She is well-developed. She is obese.   HENT:      Head: Normocephalic and atraumatic.      Right Ear: Tympanic membrane, ear canal and external ear normal. There is no impacted cerumen.      Left Ear: Tympanic membrane, ear canal and external ear normal. There is no impacted cerumen.   Eyes:      General: No scleral icterus.        Right eye: No discharge.         Left eye: No discharge.   Neck:      Thyroid: No thyromegaly.      Vascular: No JVD.   Cardiovascular:      Rate and Rhythm: Normal rate and regular rhythm.      Heart sounds: Normal heart sounds. No murmur heard.  Pulmonary:      Effort: Pulmonary effort is normal. No  respiratory distress.      Breath sounds: Normal breath sounds. No wheezing or rales.   Abdominal:      Palpations: Abdomen is soft. There is no mass.      Tenderness: There is no abdominal tenderness.   Musculoskeletal:      Cervical back: Neck supple.      Right knee: No bony tenderness. Normal range of motion.      Left knee: No bony tenderness or crepitus. Normal range of motion.   Lymphadenopathy:      Cervical: No cervical adenopathy.   Skin:     General: Skin is warm and dry.             Comments: Healed  surgical scar in the right mid r abdomen - no mass no  Tenderness   no erythema -feeling well       Neurological:      Mental Status: She is alert and oriented to person, place, and time.   Psychiatric:         Behavior: Behavior normal.       Blood pressure 131/85, pulse 90, height 5' 4\" (1.626 m), weight 252 lb (114.3 kg), not currently breastfeeding.             ASSESSMENT/PLAN:         Essential hypertension  Take high blood pressure medication as perscribed   Low- sodium diet   Maintain walking - as tolerated   Track and record blood pressure and heart rate at home   The side effects of medication discussed with patient   Patient verbalizes understanding and compliance  spironolactone 25 mg qd    amlodipine 5 mg daily  take it  regularly   Stable , continue present management  Labs to be completed before the next visit time  Continue present management           Depo-Provera contraceptive status  - POC Urine pregnancy test [73886]    Other iron deficiency anemia  Diet educatiion  iron rich food   Labs   - CBC With Differential With Platelet  - Iron And Tibc  - Folic Acid Serum (Folate)  - Ferritin  - Vitamin B12  - Oncology/Hematology Referral - In Network    Acquired skin tag  - Derm Referral - In Network  Skin check     Motion sickness, initial encounter  - tral of Scopolamine 1.5mg TD patch 1mg/3days; Place 1 patch onto the skin every third day.  Dispense: 9 patch; Refill: 1    Anemia  Labs  ferritin   iron    Vitamin B12 deficiency  - Folic Acid Serum (Folate)  - Vitamin B12  Labs to comlete   Diet educaiton      Referred  to Colonoscopy  pt  aware      hypelripidemia  Keep low saturated fat  diet   Avoid red meat and fast/fried food  fruits and vegetables   Keep active /walking ,exercise as  tolerated  Reach ideal weight   With both medications present management   Not taking  Rosuvastatin 5 mg patient states   she did not take the rosuvastatin due to nause.   Pt  Does not  Want to  Take it   Recommend patient to continue with low saturated fat diet with reduction   Labs         Right mid abdominal   Lesion/ cyst -no pain or mass   Surgical wound is healed  well   No symptoms    f/u  visit  to schedule  Dr Toth -  Patient understand the risks and benefits she still does not want to have any evaluation at this time.  States she will think about it.      GERD  Patient advised to avoid spicy food, coffee, tea, caffeinated drinks, alcohol, acidic food/juices  Advised to avoid NSAID's - Aspirin-based medications  Advised to avoid wearing  tight clothes   Advised to elevate the head of the bed   Avoid eating at least 3 hours before bedtime   Counseling on ideal weight/BMI  Take Omeprazole 40 mg - PPIs qd in the morning 30-60 minutes before breakfast always on empty stomach if any  heartburns  can take bid 1 tab 30 min - before diner    side effects and directions of medication discussed with patient. Patient verbalized understanding and compliance.       Orders Placed This Encounter   Procedures    POC Urine pregnancy test [19697]       Meds This Visit:  Requested Prescriptions      No prescriptions requested or ordered in this encounter       Imaging & Referrals:  None       ID#[id number]

## 2024-07-14 PROBLEM — Z30.42 DEPO-PROVERA CONTRACEPTIVE STATUS: Status: ACTIVE | Noted: 2019-02-07

## 2024-08-17 DIAGNOSIS — K21.9 GASTROESOPHAGEAL REFLUX DISEASE WITHOUT ESOPHAGITIS: ICD-10-CM

## 2024-08-20 RX ORDER — OMEPRAZOLE 40 MG/1
40 CAPSULE, DELAYED RELEASE ORAL DAILY
Qty: 90 CAPSULE | Refills: 3 | Status: SHIPPED | OUTPATIENT
Start: 2024-08-20

## 2024-08-21 NOTE — TELEPHONE ENCOUNTER
Refill passed per [facility] protocol.    Requested Prescriptions   Pending Prescriptions Disp Refills    OMEPRAZOLE 40 MG Oral Capsule Delayed Release [Pharmacy Med Name: OMEPRAZOLE 40MG CAPSULES] 90 capsule 3     Sig: TAKE 1 CAPSULE(40 MG) BY MOUTH DAILY       Gastrointestional Medication Protocol Passed - 8/17/2024  9:43 AM        Passed - In person appointment or virtual visit in the past 12 mos or appointment in next 3 mos     Recent Outpatient Visits              1 month ago Depo-Provera contraceptive status    [facility] [place]    Office Visit    2 months ago Cervicalgia    [facility] [place] Karpuzi, Jane, PT    Office Visit    3 months ago Cervicalgia    [facility] [place] KarpuziJane, PT    Office Visit    3 months ago Cervicalgia    [facility] [place] KarpuziJane, PT    Office Visit    3 months ago Cervicalgia    [facility] [place] KarpuziJane, PT    Office Visit                           Recent Outpatient Visits              1 month ago Depo-Provera contraceptive status    [facility], Dev Figueroa MD    Office Visit    2 months ago Cervicalgia    [facility] [place] KarpuziJane, PT    Office Visit    3 months ago Cervicalgia    [facility] [place] KarpuziJane, PT    Office Visit    3 months ago Cervicalgia    [facility] [place] KarpuziJane, PT    Office Visit    3 months ago Cervicalgia    [facility] [place] KarpuziJane, PT    Office Visit

## 2024-08-26 ENCOUNTER — TELEPHONE (OUTPATIENT)
Dept: HEMATOLOGY/ONCOLOGY | Facility: HOSPITAL | Age: 44
End: 2024-08-26

## 2024-09-01 ENCOUNTER — HOSPITAL ENCOUNTER (EMERGENCY)
Facility: HOSPITAL | Age: 44
Discharge: HOME | End: 2024-09-02
Attending: EMERGENCY MEDICINE
Payer: COMMERCIAL

## 2024-09-01 DIAGNOSIS — M54.42 ACUTE LEFT-SIDED LOW BACK PAIN WITH LEFT-SIDED SCIATICA: Primary | ICD-10-CM

## 2024-09-01 PROCEDURE — 99284 EMERGENCY DEPT VISIT MOD MDM: CPT

## 2024-09-01 PROCEDURE — 99283 EMERGENCY DEPT VISIT LOW MDM: CPT

## 2024-09-01 PROCEDURE — 96372 THER/PROPH/DIAG INJ SC/IM: CPT

## 2024-09-02 VITALS
SYSTOLIC BLOOD PRESSURE: 130 MMHG | HEART RATE: 76 BPM | RESPIRATION RATE: 18 BRPM | DIASTOLIC BLOOD PRESSURE: 75 MMHG | OXYGEN SATURATION: 100 % | TEMPERATURE: 98 F

## 2024-09-02 RX ORDER — HYDROCODONE BITARTRATE AND ACETAMINOPHEN 5; 325 MG/1; MG/1
1 TABLET ORAL EVERY 6 HOURS PRN
Qty: 20 TABLET | Refills: 0 | Status: SHIPPED | OUTPATIENT
Start: 2024-09-02

## 2024-09-02 RX ORDER — CYCLOBENZAPRINE HCL 10 MG
10 TABLET ORAL 3 TIMES DAILY PRN
Qty: 20 TABLET | Refills: 0 | Status: SHIPPED | OUTPATIENT
Start: 2024-09-02 | End: 2024-09-09

## 2024-09-02 RX ORDER — KETOROLAC TROMETHAMINE 30 MG/ML
30 INJECTION, SOLUTION INTRAMUSCULAR; INTRAVENOUS ONCE
Status: COMPLETED | OUTPATIENT
Start: 2024-09-02 | End: 2024-09-02
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
## 2024-09-02 NOTE — ED INITIAL ASSESSMENT (HPI)
S: pt presents to ED with c/o lower back pain, no direct cause that she's aware of. Denies paresthesias or incontinence.

## 2024-09-10 ENCOUNTER — HOSPITAL ENCOUNTER (OUTPATIENT)
Dept: GENERAL RADIOLOGY | Facility: HOSPITAL | Age: 44
Discharge: HOME | End: 2024-09-10
Attending: PHYSICAL MEDICINE & REHABILITATION
Payer: COMMERCIAL

## 2024-09-10 ENCOUNTER — OFFICE VISIT (OUTPATIENT)
Dept: PHYSICAL MEDICINE AND REHAB | Facility: CLINIC | Age: 44
End: 2024-09-10
Payer: COMMERCIAL

## 2024-09-10 VITALS — BODY MASS INDEX: 43 KG/M2 | WEIGHT: 252 LBS

## 2024-09-10 DIAGNOSIS — M54.42 ACUTE LEFT-SIDED LOW BACK PAIN WITH LEFT-SIDED SCIATICA: Primary | ICD-10-CM

## 2024-09-10 DIAGNOSIS — M54.42 ACUTE LEFT-SIDED LOW BACK PAIN WITH LEFT-SIDED SCIATICA: ICD-10-CM

## 2024-09-10 PROCEDURE — 99214 OFFICE O/P EST MOD 30 MIN: CPT | Performed by: PHYSICAL MEDICINE & REHABILITATION

## 2024-09-10 PROCEDURE — 72100 X-RAY EXAM L-S SPINE 2/3 VWS: CPT | Performed by: PHYSICAL MEDICINE & REHABILITATION

## 2024-09-10 RX ORDER — METHYLPREDNISOLONE 4 MG
TABLET, DOSE PACK ORAL
Qty: 1 EACH | Refills: 0 | Status: SHIPPED | OUTPATIENT
Start: 2024-09-10

## 2024-09-10 NOTE — PROGRESS NOTES
Progress note    C/C:   Chief Complaint   Patient presents with    Follow - Up     Lov 4/16/24 left lower back pain that radiates to abdomin describes pin and needles.Pain 7/10.No injuries . No surgeries. No injections.No Pt.  Hydrocodone prn ibuprofen daily some relief.       HPI: 44 year old female presents for follow up with a new issue. Began at the end of last month. Awoke with left lower back pain. Worsens with prolonged sitting, especially long car rides. Went to the ER, was given a toradol injection, discharged with flexeril and norco. Was on bedrest, went back to work yesterday. Numbness and tingling along the left side of the abdomen, above the level of the groin. Twisting is painful; standing and walking by themselves do not provoke pain. Sits leaning off to the right side. Difficulty lying on the left side. Has been taking norco; helps some, but makes her overly drowsy and dizzy. No previous history of low back disorders. No imaging. No history of spinal interventional procedures or lumbar surgery.     Pertinent allergies:   Allergies   Allergen Reactions    Azithromycin NAUSEA AND VOMITING    Ciprofloxacin NAUSEA AND VOMITING    Sulfasalazine PAIN        Physical exam:  Wt 252 lb (114.3 kg)   BMI 43.26 kg/m²      Lumbar spine exam:    No skin rash lumbar/upper sacral region  Quite limited range of motion.  Pain with both lumbar flexion and extension.  + tenderness to palpation bilateral lower lumbar paraspinals. No ttp bilateral PSIS.  5/5 LE strength b/l  SILT b/l LE  2/4 quad, gastrocs reflexes b/l  Facet loading +  Seated slump test +  SLR + left  Back tightness, but no groin or buttock pain with passive left hip flexion, internal rotation    Imaging: no new imaging to review    Assessment and plan  Acute left lower back pain with radiation into the left lower abdomen  GERD  HTN  Obesity    Recommend medrol dosepack, XR and MRI of the lumbar spine given the severity and significant functional  limitations from the acute low back pain. If does well enough with the medrol dosepack can hold off on MRI and pursue physical therapy for neutral to extension lumbar stabilization, but as it stands unless she does well with the medication she is not going to get much out of physical therapy.     She is to contact the clinic after completing the medrol dosepack for a status update.     Lewis Fontaine DO  Physical Medicine and Rehabilitation  [facility]

## 2024-09-10 NOTE — PATIENT INSTRUCTIONS
If no better after completion of the steroid pack please let me know and we will see what can do while awaiting the MRI.

## 2024-09-11 ENCOUNTER — TELEPHONE (OUTPATIENT)
Dept: PHYSICAL MEDICINE AND REHAB | Facility: CLINIC | Age: 44
End: 2024-09-11

## 2024-09-11 NOTE — TELEPHONE ENCOUNTER
S/W patient to advise Dr. Fontaine's results notes below.  Patient stated the Medrol was making queasy, this RN educated patient to be sure to take with food and drink plenty of water while on the oral steroid course.  Patient verbalized understanding.      This RN recommended if stomach pain gets worse, to notify our office.      Patient advised she would be calling [facility] to get her MRI scheduled soon.     Nothing further required.    ----- Message from Lewis Fontaine sent at 9/11/2024  6:26 AM CDT -----  Xrays reviewed, mild wear of the joints of the lower back that is not unexpected for her age group. Other than this the Xray was essentially normal. Proceed with medrol dosepack, MRI.

## 2024-09-12 ENCOUNTER — TELEPHONE (OUTPATIENT)
Dept: PHYSICAL MEDICINE AND REHAB | Facility: CLINIC | Age: 44
End: 2024-09-12

## 2024-09-12 ENCOUNTER — TELEPHONE (OUTPATIENT)
Dept: INTERNAL MEDICINE CLINIC | Facility: CLINIC | Age: 44
End: 2024-09-12

## 2024-09-12 NOTE — TELEPHONE ENCOUNTER
Recommend senokot twice daily along with continued hydration. It is likely the norco causing the constipation; have her stop the norco and complete the prednisone as prescribed.

## 2024-09-12 NOTE — TELEPHONE ENCOUNTER
Patients last depo provera injection was given on 7/11/24.  Next depo provera window would be 9/26 - 10/10.  Pls call patient to schedule next depo provera injection in between window listed.

## 2024-09-12 NOTE — TELEPHONE ENCOUNTER
Spoke with patient and informed on Dr Fontaine's recommendations. Patient stated that she already stopped Norco and is not taking it anymore.     Patient will try senokot.     Informed patient to call our office if there are any other questions or condition updates.    Closing the encounter at this time.

## 2024-09-12 NOTE — TELEPHONE ENCOUNTER
Patient called office with a condition update.     Patient is on Flexeril and Norco and now she  has been taking Prednisone (is on day 5)  ordered by Dr Fontaine.    Due to the medications, she is having constipation and it is day 6 without a bowel movement.  Patient stated that she hasen't seen her PCP and since prednisone was ordered by Dr Fontaine she reached out to our office first.     Patient has been trying to stay hydrated and also tried apple juice.    Patient stated Prednisone has been helpful for the pain. The pain is not completely gone and patient prefers to continue with the MRI as ordered. She has the MRI scheduled on 9/19/24 with an outside facility.     Routing to Dr Fontaine for his recommendations.

## 2024-09-18 ENCOUNTER — LAB ENCOUNTER (OUTPATIENT)
Dept: LAB | Facility: HOSPITAL | Age: 44
End: 2024-09-18
Attending: INTERNAL MEDICINE
Payer: COMMERCIAL

## 2024-09-18 ENCOUNTER — OFFICE VISIT (OUTPATIENT)
Dept: HEMATOLOGY/ONCOLOGY | Facility: HOSPITAL | Age: 44
End: 2024-09-18
Attending: INTERNAL MEDICINE
Payer: COMMERCIAL

## 2024-09-18 VITALS
TEMPERATURE: 98 F | SYSTOLIC BLOOD PRESSURE: 114 MMHG | HEIGHT: 64 IN | DIASTOLIC BLOOD PRESSURE: 92 MMHG | RESPIRATION RATE: 18 BRPM | OXYGEN SATURATION: 99 % | HEART RATE: 119 BPM | BODY MASS INDEX: 40.97 KG/M2 | WEIGHT: 240 LBS

## 2024-09-18 DIAGNOSIS — D50.9 MICROCYTIC ANEMIA: ICD-10-CM

## 2024-09-18 DIAGNOSIS — B37.0 ORAL THRUSH: ICD-10-CM

## 2024-09-18 DIAGNOSIS — D76.3 ROSAI-DORFMAN DISEASE (HCC): ICD-10-CM

## 2024-09-18 DIAGNOSIS — D76.3 ROSAI-DORFMAN DISEASE (HCC): Primary | ICD-10-CM

## 2024-09-18 LAB
BASOPHILS # BLD AUTO: 0.04 X10(3) UL (ref 0–0.2)
BASOPHILS NFR BLD AUTO: 0.6 %
DEPRECATED HBV CORE AB SER IA-ACNC: 46.4 NG/ML
DEPRECATED RDW RBC AUTO: 38 FL (ref 35.1–46.3)
EOSINOPHIL # BLD AUTO: 0.07 X10(3) UL (ref 0–0.7)
EOSINOPHIL NFR BLD AUTO: 1 %
ERYTHROCYTE [DISTWIDTH] IN BLOOD BY AUTOMATED COUNT: 14.1 % (ref 11–15)
ERYTHROCYTE [SEDIMENTATION RATE] IN BLOOD: 31 MM/HR
FOLATE SERPL-MCNC: 38.5 NG/ML (ref 5.4–?)
HCT VFR BLD AUTO: 39.2 %
HGB BLD-MCNC: 12.3 G/DL
IMM GRANULOCYTES # BLD AUTO: 0.01 X10(3) UL (ref 0–1)
IMM GRANULOCYTES NFR BLD: 0.1 %
IRON SATN MFR SERPL: 26 %
IRON SERPL-MCNC: 102 UG/DL
LYMPHOCYTES # BLD AUTO: 1.94 X10(3) UL (ref 1–4)
LYMPHOCYTES NFR BLD AUTO: 28.7 %
MCH RBC QN AUTO: 23.9 PG (ref 26–34)
MCHC RBC AUTO-ENTMCNC: 31.4 G/DL (ref 31–37)
MCV RBC AUTO: 76.3 FL
MONOCYTES # BLD AUTO: 0.65 X10(3) UL (ref 0.1–1)
MONOCYTES NFR BLD AUTO: 9.6 %
NEUTROPHILS # BLD AUTO: 4.04 X10 (3) UL (ref 1.5–7.7)
NEUTROPHILS # BLD AUTO: 4.04 X10(3) UL (ref 1.5–7.7)
NEUTROPHILS NFR BLD AUTO: 60 %
PLATELET # BLD AUTO: 272 10(3)UL (ref 150–450)
PLATELETS.RETICULATED NFR BLD AUTO: 14.5 % (ref 0–7)
RBC # BLD AUTO: 5.14 X10(6)UL
TIBC SERPL-MCNC: 393 UG/DL (ref 250–425)
TRANSFERRIN SERPL-MCNC: 264 MG/DL (ref 250–380)
VIT B12 SERPL-MCNC: >2000 PG/ML (ref 211–911)
WBC # BLD AUTO: 6.8 X10(3) UL (ref 4–11)

## 2024-09-18 PROCEDURE — 84466 ASSAY OF TRANSFERRIN: CPT | Performed by: INTERNAL MEDICINE

## 2024-09-18 PROCEDURE — 85652 RBC SED RATE AUTOMATED: CPT

## 2024-09-18 PROCEDURE — 83540 ASSAY OF IRON: CPT | Performed by: INTERNAL MEDICINE

## 2024-09-18 PROCEDURE — 85025 COMPLETE CBC W/AUTO DIFF WBC: CPT | Performed by: INTERNAL MEDICINE

## 2024-09-18 PROCEDURE — 99214 OFFICE O/P EST MOD 30 MIN: CPT | Performed by: INTERNAL MEDICINE

## 2024-09-18 PROCEDURE — 82728 ASSAY OF FERRITIN: CPT | Performed by: INTERNAL MEDICINE

## 2024-09-18 PROCEDURE — 36415 COLL VENOUS BLD VENIPUNCTURE: CPT | Performed by: INTERNAL MEDICINE

## 2024-09-18 PROCEDURE — 82607 VITAMIN B-12: CPT | Performed by: INTERNAL MEDICINE

## 2024-09-18 PROCEDURE — 82746 ASSAY OF FOLIC ACID SERUM: CPT | Performed by: INTERNAL MEDICINE

## 2024-09-18 RX ORDER — NYSTATIN 100000 [USP'U]/ML
500000 SUSPENSION ORAL 4 TIMES DAILY
Qty: 200 ML | Refills: 0 | Status: SHIPPED | OUTPATIENT
Start: 2024-09-18 | End: 2024-09-28
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
## 2024-09-19 ENCOUNTER — TELEPHONE (OUTPATIENT)
Dept: PHYSICAL MEDICINE AND REHAB | Facility: CLINIC | Age: 44
End: 2024-09-19

## 2024-09-19 ENCOUNTER — TELEPHONE (OUTPATIENT)
Dept: HEMATOLOGY/ONCOLOGY | Facility: HOSPITAL | Age: 44
End: 2024-09-19

## 2024-09-19 ENCOUNTER — MED REC SCAN ONLY (OUTPATIENT)
Dept: PHYSICAL MEDICINE AND REHAB | Facility: CLINIC | Age: 44
End: 2024-09-19

## 2024-09-19 NOTE — TELEPHONE ENCOUNTER
Order clarified with Dr. Mcdowell no contrast. Patient called and notified order updated and will have scheduling reach out to her to schedule.

## 2024-09-19 NOTE — TELEPHONE ENCOUNTER
Received imaging report of MRI Lumbar Sp wo Contrast from [facility] via fax. Placed in RN folder.

## 2024-09-19 NOTE — TELEPHONE ENCOUNTER
Pt called stated she spoke with Dr. Mcdowell about getting a CT scan without contrast. However when the pt tried to schedule the appt, the order is with contrast     Please send a new order, pt is requesting a call back

## 2024-09-19 NOTE — TELEPHONE ENCOUNTER
CD Containing:   MRI Lumbar Spine [Exam Date 09/19/2024]    Uploaded to PACS at this time. RN confirmed images to be viewable/reviewed by physician. Written radiologist reports copied x2. 1 copy sent to scanning, 1 copy placed in physician's mailbox.     CD & Original copy of radiologist reports mailed back to patient via [employer] with given address on file/patient drop off form.     Charted routed to physician for his review if needed.

## 2024-09-25 ENCOUNTER — OFFICE VISIT (OUTPATIENT)
Dept: PHYSICAL MEDICINE AND REHAB | Facility: CLINIC | Age: 44
End: 2024-09-25
Payer: COMMERCIAL

## 2024-09-25 VITALS — WEIGHT: 240 LBS | BODY MASS INDEX: 40.97 KG/M2 | HEIGHT: 64 IN

## 2024-09-25 DIAGNOSIS — M54.42 ACUTE LEFT-SIDED LOW BACK PAIN WITH LEFT-SIDED SCIATICA: Primary | ICD-10-CM

## 2024-09-25 DIAGNOSIS — M47.816 LUMBAR FACET ARTHROPATHY: ICD-10-CM

## 2024-09-25 PROCEDURE — 99213 OFFICE O/P EST LOW 20 MIN: CPT | Performed by: PHYSICAL MEDICINE & REHABILITATION

## 2024-09-25 NOTE — PROGRESS NOTES
Progress note    C/C:   Chief Complaint   Patient presents with    Follow - Up     LOV: 9/10/2024 pt comes in with L side low back and L side abdominal tightness/discomfort. Intermittent T/N. Denies weakness. Rates pain 5/10. Imaging done. Completed Norco and flexeril without improvement. Felt improvement from oral steroid. Takes ibuprofen PRN.      HPI: 44 year old female presents for follow up. Completed medrol dosepack with improvement and underwent an MRI of the lumbar spine. She is moving much more comfortably and appers to be out of the acutely painful phase. Still having pins/needles along the left flank and lower back pain. Difficulty getting comfortable in bed. Overall improved, but still painful and is seeking further treatment.     Pertinent allergies:   Allergies   Allergen Reactions    Azithromycin NAUSEA AND VOMITING    Ciprofloxacin NAUSEA AND VOMITING    Sulfasalazine PAIN        Physical exam:  There were no vitals taken for this visit.     Lumbar spine exam:    No skin rash lumbar/upper sacral region. No skin rash left lower abdominal region  + pain with lumbar flexion. No pain with lumbar extension.  + tenderness to palpation left lumbar paraspinals. No ttp left PSIS.  5/5 LE strength b/l in HF, KE, ADF, EHL, ankle eversion  SILT b/l LE  2/4 quad, gastrocs reflexes b/l  Facet loading negative  Seated slump test negative  SLR negative left  No pain with passive ROM of the left hip    Imaging: MRI lumbar spine dated 9/19/2024 independently reviewed, as was report. She has mild facet arthrosis at L4-L5 and L5-S1.  There is diffuse disc bulging at L4-L5 with mild bilateral foraminal stenosis, though no clear nerve root impingement within the foraminal space.    Assessment and plan  Lumbar facet arthropathy  Obesity  GERD  HTN  Rosai-Dami disease    Recommend physical therapy; recommend a trial of a neutral to extension approach. The numbness and tingling along the left lower abdomen do not  correlate with her MRI findings. She has a CT of the chest/abdomen/pelvis ordered by her oncologist for testing of Rosai-Dami disease; she should consider following through with that.     Follow up after PT if still symptomatic.     Lewis Fontaine DO  Physical Medicine and Rehabilitation  [facility]

## 2024-10-01 ENCOUNTER — OFFICE VISIT (OUTPATIENT)
Dept: INTERNAL MEDICINE CLINIC | Facility: CLINIC | Age: 44
End: 2024-10-01

## 2024-10-01 ENCOUNTER — LAB ENCOUNTER (OUTPATIENT)
Dept: LAB | Age: 44
End: 2024-10-01
Attending: NURSE PRACTITIONER
Payer: COMMERCIAL

## 2024-10-01 ENCOUNTER — NURSE ONLY (OUTPATIENT)
Dept: INTERNAL MEDICINE CLINIC | Facility: CLINIC | Age: 44
End: 2024-10-01

## 2024-10-01 VITALS
WEIGHT: 240 LBS | BODY MASS INDEX: 40.97 KG/M2 | DIASTOLIC BLOOD PRESSURE: 79 MMHG | HEART RATE: 105 BPM | SYSTOLIC BLOOD PRESSURE: 116 MMHG | HEIGHT: 64 IN

## 2024-10-01 DIAGNOSIS — Z30.42 DEPO-PROVERA CONTRACEPTIVE STATUS: Primary | ICD-10-CM

## 2024-10-01 DIAGNOSIS — B02.8 HERPES ZOSTER WITH COMPLICATION: Primary | ICD-10-CM

## 2024-10-01 DIAGNOSIS — B02.8 HERPES ZOSTER WITH COMPLICATION: ICD-10-CM

## 2024-10-01 PROCEDURE — 36415 COLL VENOUS BLD VENIPUNCTURE: CPT

## 2024-10-01 PROCEDURE — 86787 VARICELLA-ZOSTER ANTIBODY: CPT

## 2024-10-01 PROCEDURE — 99213 OFFICE O/P EST LOW 20 MIN: CPT | Performed by: NURSE PRACTITIONER

## 2024-10-01 PROCEDURE — 96372 THER/PROPH/DIAG INJ SC/IM: CPT | Performed by: INTERNAL MEDICINE

## 2024-10-01 RX ORDER — VALACYCLOVIR HYDROCHLORIDE 1 G/1
1000 TABLET, FILM COATED ORAL 3 TIMES DAILY
Qty: 21 TABLET | Refills: 0 | Status: SHIPPED | OUTPATIENT
Start: 2024-10-01 | End: 2024-10-08

## 2024-10-01 RX ORDER — HYDROCORTISONE 2.5 %
CREAM (GRAM) TOPICAL
Qty: 28 G | Refills: 0 | Status: SHIPPED | OUTPATIENT
Start: 2024-10-01

## 2024-10-01 RX ADMIN — MEDROXYPROGESTERONE ACETATE 150 MG: 150 INJECTION, SUSPENSION INTRAMUSCULAR at 08:45:00
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
## 2024-10-02 LAB
V ZOSTER IGM: <0.91 INDEX
VZV IGG SER IA-ACNC: 2644 (ref 165–?)

## 2024-10-07 ENCOUNTER — HOSPITAL ENCOUNTER (OUTPATIENT)
Dept: CT IMAGING | Facility: HOSPITAL | Age: 44
Discharge: HOME | End: 2024-10-07
Attending: INTERNAL MEDICINE
Payer: COMMERCIAL

## 2024-10-07 DIAGNOSIS — D76.3 ROSAI-DORFMAN DISEASE (HCC): ICD-10-CM

## 2024-10-07 PROCEDURE — 70490 CT SOFT TISSUE NECK W/O DYE: CPT | Performed by: INTERNAL MEDICINE

## 2024-10-07 PROCEDURE — 71250 CT THORAX DX C-: CPT | Performed by: INTERNAL MEDICINE

## 2024-10-07 PROCEDURE — 74176 CT ABD & PELVIS W/O CONTRAST: CPT | Performed by: INTERNAL MEDICINE

## 2024-10-09 ENCOUNTER — TELEPHONE (OUTPATIENT)
Dept: PHYSICAL THERAPY | Facility: HOSPITAL | Age: 44
End: 2024-10-09

## 2024-10-10 ENCOUNTER — OFFICE VISIT (OUTPATIENT)
Dept: PHYSICAL THERAPY | Age: 44
End: 2024-10-10
Attending: PHYSICAL MEDICINE & REHABILITATION
Payer: COMMERCIAL

## 2024-10-10 DIAGNOSIS — M47.816 LUMBAR FACET ARTHROPATHY: ICD-10-CM

## 2024-10-10 DIAGNOSIS — M54.42 ACUTE LEFT-SIDED LOW BACK PAIN WITH LEFT-SIDED SCIATICA: Primary | ICD-10-CM

## 2024-10-10 PROCEDURE — 97161 PT EVAL LOW COMPLEX 20 MIN: CPT | Performed by: PHYSICAL THERAPIST

## 2024-10-10 PROCEDURE — 97530 THERAPEUTIC ACTIVITIES: CPT | Performed by: PHYSICAL THERAPIST

## 2024-10-10 NOTE — PROGRESS NOTES
SPINE EVALUATION:     Diagnosis:   Acute left-sided low back pain with left-sided sciatica (M54.42)    Lumbar facet arthropathy (M47.816)      Referring Provider: Lewis Fontaine DO Date of Evaluation:    10/10/2024    Precautions:  None    Per MD: Soft tissue/MFR  Neutral to gentle extension lumbar stabilization  HEP Next MD visit:   10/30/2024  Date of Surgery: n/a     PATIENT SUMMARY   Oscar Hair is a 44 year old female who presents to therapy today with complaints of L-sided lumbar pain that started as muscle spasms on 8/31/2024 alongside tingling/numbness/nerve pain at the L flank. Pt went to the ER who proescribed Noro and Flexeril with no relief. Pt was on bed rest for 1 week. Pt underwent an MRI which revealed a mild bulging disc at the L5-S1 level. Pt went to DO who stated that the scar and L flank plank may be do to shingles which was confirmed by internist. Pt reports that occasionally feels the L flank pain during the night and mostly feels tightness and spasms in the L lower back. No reports of tingling/numbness in lower extremities. t.   Pt describes pain level current 1/10, at best 1/10, at worst 4-5/10.   Current functional limitations include sleeping on the back, rotating the spine, sitting for long periods of time for work.     Camilia describes prior level of function as good. Pt goals include sleeping on the back, rotating the spine, sitting for long periods of time for work, reducing pain and increasing range of motion.   Past medical history was reviewed with Nitishilia. Significant findings include HTN, GERD.  Pt denies diplopia, dysarthria, dysphasia, dizziness, drop attacks, bowel/bladder changes, saddle anesthesia, and SARA LE N/T.    ASSESSMENT  Oscar presents to physical therapy evaluation with primary c/o L-sided lumbar pain. The results of the objective tests and measures show decreased lumbar ROM, LE/hip strength, LE muscle length, lumbar joint mobility restrictions and  abnormal gait. Functional deficits include but are not limited to sleeping on the back, rotating the spine, sitting for long periods of time for work.  Signs and symptoms are consistent with diagnosis of Acute left-sided low back pain with left-sided sciatica (M54.42), Lumbar facet arthropathy (M47.816). Pt and PT discussed evaluation findings, pathology, POC and HEP.  Pt voiced understanding and performs HEP correctly without reported pain. Skilled Physical Therapy is medically necessary to address the above impairments and reach functional goals.     OBJECTIVE:   Observation/Posture: Pt seated with weight shift to the L  Neuro Screen: WNL    Lumbar AROM: (* denotes performed with pain)  Flexion: 50*/80  Extension: 20/25  Sidebending: R 20*/35; L 30/35  Rotation: R 30/45; L 30/45    Accessory motion: Moderate hypomobility of the L3-L4, L4-L5, L5-S1 segments in the P/A direction  Palpation: TTP: B  Thoracolumbar Fascia  Lumbar Paraspinals  Piriformis/Glut Max/Glut Med  Quadratus Lumborum    Strength: (* denotes performed with pain)  LE   Hip flexion (L2): R 4/5; L 3+*/5  Hip abduction: R 4/5; L 3+/5  Hip Extension: R 4/5; L /5   Hip ER: R 4/5; L 3+*/5  Hip IR: R 4/5; L 3+*/5  Knee Flexion: R 4/5; L 4/5   Knee extension (L3): R 4/5; L 4/5   DF (L4): R 4/5; L 4/5  Great Toe Ext (L5): R 4/5, L 4/5  PF (S1): R 4/5; L 4/5     Flexibility: Lumbar/LE     R L   Hamstrings moderately restricted moderately restricted   Piriformis moderately restricted moderately restricted   Hip Flexor moderately restricted moderately restricted   TFL moderately restricted moderately restricted   Quads moderately restricted moderately restricted   Gastrocs moderately restricted moderately restricted       Special tests:   Slump Test: (-)  Thigh Thrust Test: (-)    Gait: pt ambulates on level ground with trendelenburg/waddle.  Balance: SLS R 30 sec, L 30 sec    Today’s Treatment and Response:   Pt education was provided on exam findings,  treatment diagnosis, treatment plan, expectations, and prognosis. Pt was also provided recommendations for possible soreness after evaluation and importance of remaining active  Patient was instructed in and issued a HEP for: Access Code: [email or web address]  URL: [email or web address]  Date: 10/10/2024  Prepared by: Jane Loving    Exercises  - Lying Prone  - 1 x daily - 7 x weekly - 5 minutes hold  - Prone Press Up On Elbows  - 1 x daily - 7 x weekly - 2 minute hold  - Supine Posterior Pelvic Tilt  - 1 x daily - 7 x weekly - 2 sets - 10 reps    Charges: PT Eval Low Complexity      Total Timed Treatment: 15 min     Total Treatment Time: 35 min   Therex: 0 minutes  Theract: 15 minutes  NMR: 0 minutes  Manual Therapy: 0 minutes    Based on clinical rationale and outcome measures, this evaluation involved Low Complexity decision making due to 1-2 personal factors/comorbidities, body structures involved/activity limitations, and unstable symptoms including changing pain levels.  PLAN OF CARE:    Goals: (to be met in 8 visits)   Pt will improve transversus abdominis recruitment to perform proper isometric contraction without requiring verbal or tactile cuing to promote advancement of therex   Pt will demonstrate good understanding of proper posture and body mechanics to decrease pain and improve spinal safety   Pt will improve lumbar spine AROM flexion to >80 deg to allow increase ease with bending forward to don shoes   Pt will report improved symptom centralization and absence of radicular symptoms for 3 consecutive days to improve function with ADL   Pt will have decreased paraspinal mm tension to tolerate sitting >60+ minutes for work and home activities   Pt will demonstrate improved core strength to be able to perform squatting with <1/10 pain   Pt will be independent and compliant with comprehensive HEP to maintain progress achieved in PT       Frequency / Duration: Patient will be seen for 2 x/week or a  total of 8 visits over a 90 day period. Treatment will include: Manual Therapy, Neuromuscular Re-education, Self-Care Home Management, Therapeutic Activities, Therapeutic Exercise, and Home Exercise Program instruction    Education or treatment limitation: None  Rehab Potential:good    Patient/Family/Caregiver was advised of these findings, precautions, and treatment options and has agreed to actively participate in planning and for this course of care.    Thank you for your referral. Please co-sign or sign and return this letter via fax as soon as possible to [phone]. If you have any questions, please contact me at Dept: [phone]    Sincerely,  Electronically signed by therapist: Jane Loving PT    Physician's certification required: Yes  I certify the need for these services furnished under this plan of treatment and while under my care.    X___________________________________________________ Date____________________    Certification From: 10/10/2024  To:1/8/2025

## 2024-10-16 NOTE — PROGRESS NOTES
Diagnosis:   Acute left-sided low back pain with left-sided sciatica (M54.42)     Lumbar facet arthropathy (M47.816)      Referring Provider: Lewis Fontaine DO Date of Evaluation:    10/10/2024    Precautions:  None    Per MD: Soft tissue/MFR  Neutral to gentle extension lumbar stabilization  HEP Next MD visit:   10/30/2024  Date of Surgery: n/a   Insurance Primary/Secondary: [employer] [facility] / N/A     # Auth Visits: 8            Subjective: Pt reports \"the lumbar spine is stiff today but overall I feel a little looser, I have been doing my exercises at home which have not caused any additional pain.\"    Pain: 4-5/10 (stiffness)      Objective: See table below.      Assessment: Pt responded well to therapeutic interventions with TTP to the L transverse processes of L4-L5. Added core strengthening exercises to help with ADLs pain-free.      Goals:   (to be met in 8 visits)   Pt will improve transversus abdominis recruitment to perform proper isometric contraction without requiring verbal or tactile cuing to promote advancement of therex   Pt will demonstrate good understanding of proper posture and body mechanics to decrease pain and improve spinal safety   Pt will improve lumbar spine AROM flexion to >80 deg to allow increase ease with bending forward to don shoes   Pt will report improved symptom centralization and absence of radicular symptoms for 3 consecutive days to improve function with ADL   Pt will have decreased paraspinal mm tension to tolerate sitting >60+ minutes for work and home activities   Pt will demonstrate improved core strength to be able to perform squatting with <1/10 pain   Pt will be independent and compliant with comprehensive HEP to maintain progress achieved in PT     Plan: Progress skilled PT as tolerated to help increase tissue flexibility and improve ROM and strength.  Date: 10/17/2024  TX#: 2/6-8 Date:                 TX#: 3/ Date:                 TX#: 4/ Date:                  TX#: 5/ Date:   Tx#: 6/   Therex: x10'  NuStep: x5' L4  Piriformis Stretch: 3x30\" L  HS Stretch: 3x30\" L  Calf Stretch: 2x30\" slantboard L2       Theract: x20'  Prone Press Up: 1x15 on forearms  Pelvic Tilt: 1x15 with 2\" hold  Sciatic Nerve Flossing: 1x15 L  Hooklying Lumbar Rotation: 1x15  Seated Hip Adduction: 1x15 with pelvic tilt and 3\" hold  Seated Marching: 1x15  with pelvic tilt  Standing Hip Abduction: 1x15 R/L       NMR: x0'       Manual Therapy: x10'  STW: B lumbar paraspinals, QL  Grade I mobilizations of the L3-L4, L4-L5, L5-S1 in the P/A direction       HEP:   Exercises  - Lying Prone  - 1 x daily - 7 x weekly - 5 minutes hold  - Prone Press Up On Elbows  - 1 x daily - 7 x weekly - 2 minute hold  - Supine Posterior Pelvic Tilt  - 1 x daily - 7 x weekly - 2 sets - 10 reps    Charges: Therex: 10 minutes  Theract: 20 minutes  NMR: 10 minutes  Manual Therapy: 10 minutes  Total Timed Treatment: 40 min  Total Treatment Time: 40 min

## 2024-10-17 ENCOUNTER — OFFICE VISIT (OUTPATIENT)
Dept: PHYSICAL THERAPY | Age: 44
End: 2024-10-17
Attending: PHYSICAL MEDICINE & REHABILITATION
Payer: COMMERCIAL

## 2024-10-17 DIAGNOSIS — M47.816 LUMBAR FACET ARTHROPATHY: ICD-10-CM

## 2024-10-17 DIAGNOSIS — M54.42 ACUTE LEFT-SIDED LOW BACK PAIN WITH LEFT-SIDED SCIATICA: Primary | ICD-10-CM

## 2024-10-17 PROCEDURE — 97110 THERAPEUTIC EXERCISES: CPT | Performed by: PHYSICAL THERAPIST

## 2024-10-17 PROCEDURE — 97140 MANUAL THERAPY 1/> REGIONS: CPT | Performed by: PHYSICAL THERAPIST

## 2024-10-17 PROCEDURE — 97530 THERAPEUTIC ACTIVITIES: CPT | Performed by: PHYSICAL THERAPIST

## 2024-10-21 NOTE — PROGRESS NOTES
Diagnosis:   Acute left-sided low back pain with left-sided sciatica (M54.42)     Lumbar facet arthropathy (M47.816)      Referring Provider: Lewis Fontaine DO Date of Evaluation:    10/10/2024    Precautions:  None    Per MD: Soft tissue/MFR  Neutral to gentle extension lumbar stabilization  HEP Next MD visit:   10/30/2024  Date of Surgery: n/a   Insurance Primary/Secondary: [employer] [facility] / N/A     # Auth Visits: 8            Subjective: Pt reports \"I did go to dance on Saturday which made my back feel sore and had to take an ibuprofen but I am overall feeling better.\"    Pain: 3-4/10 (stiffness)      Objective: See table below.      Assessment: Pt responded well to therapeutic interventions with continued TTP to the L transverse processes of L4-L5. Continued with core strengthening and LE exercises to help with ADLs pain-free.      Goals:   (to be met in 8 visits)   Pt will improve transversus abdominis recruitment to perform proper isometric contraction without requiring verbal or tactile cuing to promote advancement of therex   Pt will demonstrate good understanding of proper posture and body mechanics to decrease pain and improve spinal safety   Pt will improve lumbar spine AROM flexion to >80 deg to allow increase ease with bending forward to don shoes   Pt will report improved symptom centralization and absence of radicular symptoms for 3 consecutive days to improve function with ADL   Pt will have decreased paraspinal mm tension to tolerate sitting >60+ minutes for work and home activities   Pt will demonstrate improved core strength to be able to perform squatting with <1/10 pain   Pt will be independent and compliant with comprehensive HEP to maintain progress achieved in PT     Plan: Progress skilled PT as tolerated to help increase tissue flexibility and improve ROM and strength.  Date: 10/17/2024  TX#: 2/6-8 Date: 10/22/2024                 TX#: 3/6-8 Date:                 TX#: 4/ Date:                  TX#: 5/ Date:   Tx#: 6/   Therex: x10'  NuStep: x5' L4  Piriformis Stretch: 3x30\" L  HS Stretch: 3x30\" L  Calf Stretch: 2x30\" slantboard L2 Therex: x10'  NuStep: x5' L4  Piriformis Stretch: 4x30\" L/R seated  HS Stretch: 4x30\" L/R  Calf Stretch: 2x30\" slantboard L2  B Shuttle: 2x10 6 cords      Theract: x20'  Prone Press Up: 1x15 on forearms  Pelvic Tilt: 1x15 with 2\" hold  Sciatic Nerve Flossing: 1x15 L  Hooklying Lumbar Rotation: 1x15  Seated Hip Adduction: 1x15 with pelvic tilt and 3\" hold  Seated Marching: 1x15  with pelvic tilt  Standing Hip Abduction: 1x15 R/L Theract: x20'  Prone Press Up: 1x15 on forearms- NP  Pelvic Tilt: 1x15 with 2\" hold  Sciatic Nerve Flossing: 1x15 L/R  Hooklying Lumbar Rotation: 1x15  Hip Adduction: 1x15 with pelvic tilt and 3\" hold  Marching: 1x15  with pelvic tilt  Standing Hip Abduction: 1x15 R/L      NMR: x0' NMR: x0'      Manual Therapy: x10'  STW: B lumbar paraspinals, QL  Grade I mobilizations of the L3-L4, L4-L5, L5-S1 in the P/A direction Manual Therapy: x10'  STW: B lumbar paraspinals, QL  Grade I mobilizations of the L3-L4, L4-L5, L5-S1 in the P/A direction      HEP:   Exercises  - Lying Prone  - 1 x daily - 7 x weekly - 5 minutes hold  - Prone Press Up On Elbows  - 1 x daily - 7 x weekly - 2 minute hold  - Supine Posterior Pelvic Tilt  - 1 x daily - 7 x weekly - 2 sets - 10 reps    Charges: Therex: 10 minutes  Theract: 20 minutes  NMR: 10 minutes  Manual Therapy: 10 minutes  Total Timed Treatment: 40 min  Total Treatment Time: 40 min

## 2024-10-22 ENCOUNTER — OFFICE VISIT (OUTPATIENT)
Dept: PHYSICAL THERAPY | Age: 44
End: 2024-10-22
Attending: PHYSICAL MEDICINE & REHABILITATION
Payer: COMMERCIAL

## 2024-10-22 DIAGNOSIS — M47.816 LUMBAR FACET ARTHROPATHY: ICD-10-CM

## 2024-10-22 DIAGNOSIS — M54.42 ACUTE LEFT-SIDED LOW BACK PAIN WITH LEFT-SIDED SCIATICA: Primary | ICD-10-CM

## 2024-10-22 PROCEDURE — 97110 THERAPEUTIC EXERCISES: CPT | Performed by: PHYSICAL THERAPIST

## 2024-10-22 PROCEDURE — 97140 MANUAL THERAPY 1/> REGIONS: CPT | Performed by: PHYSICAL THERAPIST

## 2024-10-22 PROCEDURE — 97530 THERAPEUTIC ACTIVITIES: CPT | Performed by: PHYSICAL THERAPIST

## 2024-10-23 ENCOUNTER — TELEPHONE (OUTPATIENT)
Dept: HEMATOLOGY/ONCOLOGY | Facility: HOSPITAL | Age: 44
End: 2024-10-23

## 2024-10-23 ENCOUNTER — OFFICE VISIT (OUTPATIENT)
Facility: CLINIC | Age: 44
End: 2024-10-23

## 2024-10-23 VITALS
BODY MASS INDEX: 40.61 KG/M2 | HEIGHT: 64 IN | SYSTOLIC BLOOD PRESSURE: 142 MMHG | DIASTOLIC BLOOD PRESSURE: 85 MMHG | HEART RATE: 101 BPM | WEIGHT: 237.88 LBS

## 2024-10-23 DIAGNOSIS — R79.0 LOW FERRITIN: ICD-10-CM

## 2024-10-23 DIAGNOSIS — Z12.11 SCREEN FOR COLON CANCER: Primary | ICD-10-CM

## 2024-10-23 PROCEDURE — S0285 CNSLT BEFORE SCREEN COLONOSC: HCPCS | Performed by: STUDENT IN AN ORGANIZED HEALTH CARE EDUCATION/TRAINING PROGRAM
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
## 2024-10-23 NOTE — PROGRESS NOTES
Diagnosis:   Acute left-sided low back pain with left-sided sciatica (M54.42)     Lumbar facet arthropathy (M47.816)      Referring Provider: Lewis Fontaine DO Date of Evaluation:    10/10/2024    Precautions:  None    Per MD: Soft tissue/MFR  Neutral to gentle extension lumbar stabilization  HEP Next MD visit:   10/30/2024  Date of Surgery: n/a   Insurance Primary/Secondary: [facility] / N/A     # Auth Visits: 8            Subjective: Pt reports \"the back is sore today, no pain, I have been doing my exercises at home with no complaints.\"    Pain: 3/10 (stiffness)      Objective: See table below.      Assessment: Pt responded well to therapeutic interventions with continued TTP to the L transverse processes of L4-L5 and sacrum. Added standing lumbar extension and updated HEP to help with stretching during work.      Goals:   (to be met in 8 visits)   Pt will improve transversus abdominis recruitment to perform proper isometric contraction without requiring verbal or tactile cuing to promote advancement of therex   Pt will demonstrate good understanding of proper posture and body mechanics to decrease pain and improve spinal safety   Pt will improve lumbar spine AROM flexion to >80 deg to allow increase ease with bending forward to don shoes   Pt will report improved symptom centralization and absence of radicular symptoms for 3 consecutive days to improve function with ADL   Pt will have decreased paraspinal mm tension to tolerate sitting >60+ minutes for work and home activities   Pt will demonstrate improved core strength to be able to perform squatting with <1/10 pain   Pt will be independent and compliant with comprehensive HEP to maintain progress achieved in PT     Plan: Progress skilled PT as tolerated to help increase tissue flexibility and improve ROM and strength.  Date: 10/17/2024  TX#: 2/6-8 Date: 10/22/2024                 TX#: 3/6-8 Date: 10/24/2024                TX#: 4/6-8 Date:                  TX#: 5/ Date:   Tx#: 6/   Therex: x10'  NuStep: x5' L4  Piriformis Stretch: 3x30\" L  HS Stretch: 3x30\" L  Calf Stretch: 2x30\" slantboard L2 Therex: x10'  NuStep: x5' L4  Piriformis Stretch: 4x30\" L/R seated  HS Stretch: 4x30\" L/R  Calf Stretch: 2x30\" slantboard L2  B Shuttle: 2x10 6 cords Therex: x10'  NuStep: x5' L4  Piriformis Stretch: 4x30\" L/R seated  HS Stretch: 4x30\" L/R  Calf Stretch: 2x30\" slantboard L2  B Shuttle: 2x10 6 cords     Theract: x20'  Prone Press Up: 1x15 on forearms  Pelvic Tilt: 1x15 with 2\" hold  Sciatic Nerve Flossing: 1x15 L  Hooklying Lumbar Rotation: 1x15  Seated Hip Adduction: 1x15 with pelvic tilt and 3\" hold  Seated Marching: 1x15  with pelvic tilt  Standing Hip Abduction: 1x15 R/L Theract: x20'  Prone Press Up: 1x15 on forearms- NP  Pelvic Tilt: 1x15 with 2\" hold  Sciatic Nerve Flossing: 1x15 L/R  Hooklying Lumbar Rotation: 1x15  Hip Adduction: 1x15 with pelvic tilt and 3\" hold  Marching: 1x15  with pelvic tilt  Standing Hip Abduction: 1x15 R/L Theract: x20'  Standing Lumbar Extension: 1x15  Pelvic Tilt: 1x15 with 2\" hold  Sciatic Nerve Flossing: 1x15 L/R  Hooklying Lumbar Rotation: 1x15  Hip Adduction: 1x15 with pelvic tilt and 3\" hold  Marching: 1x15  with pelvic tilt  Standing Hip Abduction: 1x15 R/L     NMR: x0' NMR: x0' NMR: x0'     Manual Therapy: x10'  STW: B lumbar paraspinals, QL  Grade I mobilizations of the L3-L4, L4-L5, L5-S1 in the P/A direction Manual Therapy: x10'  STW: B lumbar paraspinals, QL  Grade I mobilizations of the L3-L4, L4-L5, L5-S1 in the P/A direction Manual Therapy: x10'  STW: B lumbar paraspinals, QL  Grade I mobilizations of the L3-L4, L4-L5, L5-S1 in the P/A direction     HEP:   Exercises  - Lying Prone  - 1 x daily - 7 x weekly - 5 minutes hold  - Prone Press Up On Elbows  - 1 x daily - 7 x weekly - 2 minute hold  - Supine Posterior Pelvic Tilt  - 1 x daily - 7 x weekly - 2 sets - 10 reps  - Seated Piriformis Stretch  - 1 x daily - 7 x weekly - 2  sets - 30 second hold  Charges: Therex: 10 minutes  Theract: 20 minutes  NMR: 10 minutes  Manual Therapy: 10 minutes  Total Timed Treatment: 40 min  Total Treatment Time: 40 min

## 2024-10-23 NOTE — TELEPHONE ENCOUNTER
Patient presented at front check-in desk.   Would like to review CT scan results with Dr Mcdowell.    Asking if she should make an appointment, or if Dr Mcdowell is comfortable discussing over the phone.      Please follow-up with patient

## 2024-10-24 ENCOUNTER — OFFICE VISIT (OUTPATIENT)
Dept: INTERNAL MEDICINE CLINIC | Facility: CLINIC | Age: 44
End: 2024-10-24

## 2024-10-24 ENCOUNTER — OFFICE VISIT (OUTPATIENT)
Dept: PHYSICAL THERAPY | Age: 44
End: 2024-10-24
Attending: PHYSICAL MEDICINE & REHABILITATION
Payer: COMMERCIAL

## 2024-10-24 VITALS
SYSTOLIC BLOOD PRESSURE: 139 MMHG | DIASTOLIC BLOOD PRESSURE: 88 MMHG | HEART RATE: 88 BPM | WEIGHT: 237 LBS | BODY MASS INDEX: 40.46 KG/M2 | HEIGHT: 64 IN

## 2024-10-24 DIAGNOSIS — E04.1 THYROID NODULE: ICD-10-CM

## 2024-10-24 DIAGNOSIS — M54.42 ACUTE LEFT-SIDED LOW BACK PAIN WITH LEFT-SIDED SCIATICA: Primary | ICD-10-CM

## 2024-10-24 DIAGNOSIS — M47.816 LUMBAR FACET ARTHROPATHY: ICD-10-CM

## 2024-10-24 DIAGNOSIS — M54.40 MIDLINE LOW BACK PAIN WITH SCIATICA, SCIATICA LATERALITY UNSPECIFIED, UNSPECIFIED CHRONICITY: Primary | ICD-10-CM

## 2024-10-24 DIAGNOSIS — Z86.19 HISTORY OF SHINGLES: ICD-10-CM

## 2024-10-24 PROCEDURE — 97530 THERAPEUTIC ACTIVITIES: CPT | Performed by: PHYSICAL THERAPIST

## 2024-10-24 PROCEDURE — 97140 MANUAL THERAPY 1/> REGIONS: CPT | Performed by: PHYSICAL THERAPIST

## 2024-10-24 PROCEDURE — 99214 OFFICE O/P EST MOD 30 MIN: CPT | Performed by: INTERNAL MEDICINE

## 2024-10-24 PROCEDURE — 97110 THERAPEUTIC EXERCISES: CPT | Performed by: PHYSICAL THERAPIST

## 2024-10-24 NOTE — PROGRESS NOTES
HPI:    Patient ID: Oscar Hair is a 44 year old female.  Patient presents with    Chief Complaint   Patient presents with    Test Results     Here to discuss CT results she completed on 10/7/24.    Bruising     C/o a bruise on left thigh since mid August.     Seen Dr Toth and had Ct scan on 10/7 /24 . Ct   chest abdomen pelvis was good ,but showed   Nodule on  thyroid gl 2.4 cm     Colonoscopy -per pt with Dr Steward - is postponed   for 6  months         Patient state had some bad Lower back pain  and tingling sensation around the side to front of abdomen since  9/1/24   Was in ER , and was  diagnosed  with   Shingles   Took   antiviral  medication   Now feel well .  Has also new round  like bruise on left thigh since 8/24 that is getting  harder - no pain or itchiness   HTN bp check   Patient states she is taking her medications regularly amlodipine 5 mg and spironolactone 25 mg every day   Patient states otherwise she is doing well denies any chest pain shortness of breath chest pain or palpitation        Hypertension  This is a chronic problem. The current episode started more than 1 year ago. The problem has been gradually improving since onset. The problem is controlled. Pertinent negatives include no palpitations, peripheral edema, PND or shortness of breath. Risk factors for coronary artery disease include obesity. Past treatments include lifestyle changes, diuretics and calcium channel blockers. The current treatment provides significant improvement.   Skin  This is a new (R thigh round skin lesion feels like bruise that is  getting harder to touch) problem. Episode onset: 8/24. The problem has been gradually worsening. Pertinent negatives include no arthralgias (resolved - had  shingles). She has tried nothing for the symptoms. The treatment provided no relief.       Review of Systems   HENT:  Negative for ear pain.    Eyes:  Negative for pain and redness.   Respiratory:  Negative for shortness of  breath and wheezing.    Cardiovascular:  Negative for palpitations, leg swelling and PND.   Gastrointestinal:  Negative for blood in stool, constipation and diarrhea.             Genitourinary:  Negative for frequency.   Musculoskeletal:  Positive for back pain. Negative for arthralgias (resolved - had  shingles).   Skin:  Negative for pallor.             Neurological:  Negative for dizziness.   Psychiatric/Behavioral:  Negative for confusion. The patient is not nervous/anxious.             Current Outpatient Medications   Medication Sig Dispense Refill    FIBER ADULT GUMMIES OR Take by mouth daily.      hydrocortisone 2.5 % External Cream Apply to affected area twice daily 28 g 0    Omeprazole 40 MG Oral Capsule Delayed Release Take 1 capsule (40 mg total) by mouth daily. 90 capsule 3    spironolactone 25 MG Oral Tab Take 1 tablet (25 mg total) by mouth every morning. 90 tablet 1    amLODIPine 5 MG Oral Tab Take 1 tablet (5 mg total) by mouth every morning. 90 tablet 3    PREVIDENT 5000 SENSITIVE 1.1-5 % Dental Paste USE TWICE DAILY IN PLACE OF REGULAR TOOTHPASTE.      APPLE CIDER VINEGAR OR Take by mouth.      Ascorbic Acid (VITAMIN C ADULT GUMMIES OR) Take 2 tablets by mouth daily.      Vitamin B-12 1000 MCG Oral Tab Take 1 tablet by mouth daily      DiphenhydrAMINE HCl (BENADRYL ALLERGY OR) Take by mouth daily as needed.      Multiple Vitamins-Minerals (MULTIVITAMIN GUMMIES WOMENS OR) Take by mouth daily.      loratadine 10 MG Oral Tab Take 1 tablet (10 mg total) by mouth daily.      Scopolamine 1.5mg TD patch 1mg/3days Place 1 patch onto the skin every third day. (Patient not taking: Reported on 10/24/2024) 9 patch 1     Allergies:  Allergies   Allergen Reactions    Azithromycin NAUSEA AND VOMITING    Ciprofloxacin NAUSEA AND VOMITING    Sulfasalazine PAIN      PHYSICAL EXAM:   Physical Exam  Vitals and nursing note reviewed.   Constitutional:       General: She is not in acute distress.     Appearance: She is  well-developed. She is obese.   HENT:      Head: Normocephalic and atraumatic.   Eyes:      General: No scleral icterus.        Right eye: No discharge.         Left eye: No discharge.   Neck:      Thyroid: No thyromegaly.      Vascular: No JVD.   Cardiovascular:      Rate and Rhythm: Normal rate and regular rhythm.      Heart sounds: Normal heart sounds. No murmur heard.  Pulmonary:      Effort: Pulmonary effort is normal. No respiratory distress.      Breath sounds: Normal breath sounds. No wheezing or rales.   Abdominal:      Palpations: Abdomen is soft. There is no mass.      Tenderness: There is no abdominal tenderness.   Musculoskeletal:      Cervical back: Neck supple.      Right knee: No bony tenderness. Normal range of motion.      Left knee: No bony tenderness or crepitus. Normal range of motion.   Lymphadenopathy:      Cervical: No cervical adenopathy.   Skin:     General: Skin is warm and dry.             Comments:     Small skin healed lesion lower back      Right thigh round  hard -around  10   cm  harder erythemas lesion   with  smaller lumps -since  8/24    No Tender  no pruritus      Neurological:      Mental Status: She is alert and oriented to person, place, and time.   Psychiatric:         Behavior: Behavior normal.       Blood pressure 139/88, pulse 88, height 5' 4\" (1.626 m), weight 237 lb (107.5 kg), last menstrual period 09/27/2024, not currently breastfeeding.             ASSESSMENT/PLAN:     Midline low back pain with sciatica, sciatica laterality unspecified, unspecified chronicity  L5- S1 djd bill foraminal narrowing  On PT - helping  d     History of shingles  Resolved     Essential hypertension  Take high blood pressure medication as perscribed   Low- sodium diet   Maintain walking - as tolerated   Track and record blood pressure and heart rate at home   The side effects of medication discussed with patient   Patient verbalizes understanding and compliance  spironolactone 25 mg qd     amlodipine 5 mg daily  take it  regularly   Stable , continue present management  Labs to be completed before the next visit time  Continue present management       Thyroid nodule  On Ct imaging   - US THYROID (CPT=76536); Future  - Endocrine Referral - [facility]) [address]         Hx Other iron deficiency anemia  Diet educatiion  iron rich food   Resolved   Monitor labs    iron rich food          Rosai-Dami disease (HCC)      Right mid abdominal   Lesion/ cyst -no pain or mass   No symptoms   Had  f/u  visit -  Dr Toth -  Had CT  imaging - discussed with pt    CONCLUSION:  1. No cervical, mediastinal, axillary, intra-abdominal, or intrapelvic lymphadenopathy is apparent.     2. No acute intrathoracic process is identified.     3. No acute intra-abdominal process is identified by noncontrast CT technique. The etiology of the patient's symptoms is unclear from this study.     4. Right thyroid lobe nodule measuring up to 2.4 cm. If not previously worked up, follow-up thyroid sonography is suggested.       5. Low-density appearance of the intracardiac blood pool raises the possibility of underlying anemia. Correlate with hematologic parameters.     6. Lesser incidental findings as above.           elm-remote.              GERD  Patient advised to avoid spicy food, coffee, tea, caffeinated drinks, alcohol, acidic food/juices  Advised to avoid NSAID's - Aspirin-based medications  Advised to avoid wearing  tight clothes   Advised to elevate the head of the bed   Avoid eating at least 3 hours before bedtime   Counseling on ideal weight/BMI  Take Omeprazole 40 mg - PPIs qd in the morning 30-60 minutes before breakfast always on empty stomach if any  heartburns  can take bid 1 tab 30 min - before diner    side effects and directions of medication discussed with patient. Patient verbalized understanding and compliance.       No orders of the defined types were placed in this encounter.      Meds This  Visit:  Requested Prescriptions      No prescriptions requested or ordered in this encounter       Imaging & Referrals:  None       ID#[id number]

## 2024-10-28 NOTE — PROGRESS NOTES
Diagnosis:   Acute left-sided low back pain with left-sided sciatica (M54.42)     Lumbar facet arthropathy (M47.816)      Referring Provider: Lewis Fontaine DO Date of Evaluation:    10/10/2024    Precautions:  None    Per MD: Soft tissue/MFR  Neutral to gentle extension lumbar stabilization  HEP Next MD visit:   10/30/2024  Date of Surgery: n/a   Insurance Primary/Secondary: [facility] [facility] / N/A     # Auth Visits: 8            Subjective: Pt reports \"the back is sore today, no pain, I have been doing my exercises at home with no complaints.\"    Pain: 3/10 (stiffness)      Objective: See table below.      Assessment: Pt responded well to therapeutic interventions with continued TTP to the L transverse processes of L4-L5 and sacrum. Added standing lumbar extension and updated HEP to help with stretching during work.      Goals:   (to be met in 8 visits)   Pt will improve transversus abdominis recruitment to perform proper isometric contraction without requiring verbal or tactile cuing to promote advancement of therex   Pt will demonstrate good understanding of proper posture and body mechanics to decrease pain and improve spinal safety   Pt will improve lumbar spine AROM flexion to >80 deg to allow increase ease with bending forward to don shoes   Pt will report improved symptom centralization and absence of radicular symptoms for 3 consecutive days to improve function with ADL   Pt will have decreased paraspinal mm tension to tolerate sitting >60+ minutes for work and home activities   Pt will demonstrate improved core strength to be able to perform squatting with <1/10 pain   Pt will be independent and compliant with comprehensive HEP to maintain progress achieved in PT     Plan: Progress skilled PT as tolerated to help increase tissue flexibility and improve ROM and strength.  Date: 10/17/2024  TX#: 2/6-8 Date: 10/22/2024                 TX#: 3/6-8 Date: 10/24/2024                TX#: 4/6-8 Date: 10/29/2024                 TX#: 5/6-8 Date:   Tx#: 6/   Therex: x10'  NuStep: x5' L4  Piriformis Stretch: 3x30\" L  HS Stretch: 3x30\" L  Calf Stretch: 2x30\" slantboard L2 Therex: x10'  NuStep: x5' L4  Piriformis Stretch: 4x30\" L/R seated  HS Stretch: 4x30\" L/R  Calf Stretch: 2x30\" slantboard L2  B Shuttle: 2x10 6 cords Therex: x10'  NuStep: x5' L4  Piriformis Stretch: 4x30\" L/R seated  HS Stretch: 4x30\" L/R  Calf Stretch: 2x30\" slantboard L2  B Shuttle: 2x10 6 cords Therex: x10'  NuStep: x5' L4  Piriformis Stretch: 4x30\" L/R seated  HS Stretch: 4x30\" L/R  Calf Stretch: 2x30\" slantboard L2  B Shuttle: 2x10 6 cords    Theract: x20'  Prone Press Up: 1x15 on forearms  Pelvic Tilt: 1x15 with 2\" hold  Sciatic Nerve Flossing: 1x15 L  Hooklying Lumbar Rotation: 1x15  Seated Hip Adduction: 1x15 with pelvic tilt and 3\" hold  Seated Marching: 1x15  with pelvic tilt  Standing Hip Abduction: 1x15 R/L Theract: x20'  Prone Press Up: 1x15 on forearms- NP  Pelvic Tilt: 1x15 with 2\" hold  Sciatic Nerve Flossing: 1x15 L/R  Hooklying Lumbar Rotation: 1x15  Hip Adduction: 1x15 with pelvic tilt and 3\" hold  Marching: 1x15  with pelvic tilt  Standing Hip Abduction: 1x15 R/L Theract: x20'  Standing Lumbar Extension: 1x15  Pelvic Tilt: 1x15 with 2\" hold  Sciatic Nerve Flossing: 1x15 L/R  Hooklying Lumbar Rotation: 1x15  Hip Adduction: 1x15 with pelvic tilt and 3\" hold  Marching: 1x15  with pelvic tilt  Standing Hip Abduction: 1x15 R/L Theract: x20'  Standing Lumbar Extension: 1x15  Pelvic Tilt: 1x15 with 2\" hold  Sciatic Nerve Flossing: 1x15 L/R  Hooklying Lumbar Rotation: 1x15  Hip Adduction: 1x15 with pelvic tilt and 3\" hold  Marching: 1x15  with pelvic tilt  Standing Hip Abduction: 1x15 R/L    NMR: x0' NMR: x0' NMR: x0' NMR: x0'    Manual Therapy: x10'  STW: B lumbar paraspinals, QL  Grade I mobilizations of the L3-L4, L4-L5, L5-S1 in the P/A direction Manual Therapy: x10'  STW: B lumbar paraspinals, QL  Grade I mobilizations of the L3-L4, L4-L5,  L5-S1 in the P/A direction Manual Therapy: x10'  STW: B lumbar paraspinals, QL  Grade I mobilizations of the L3-L4, L4-L5, L5-S1 in the P/A direction Manual Therapy: x10'  STW: B lumbar paraspinals, QL  Grade I mobilizations of the L3-L4, L4-L5, L5-S1 in the P/A direction    HEP:   Exercises  - Lying Prone  - 1 x daily - 7 x weekly - 5 minutes hold  - Prone Press Up On Elbows  - 1 x daily - 7 x weekly - 2 minute hold  - Supine Posterior Pelvic Tilt  - 1 x daily - 7 x weekly - 2 sets - 10 reps  - Seated Piriformis Stretch  - 1 x daily - 7 x weekly - 2 sets - 30 second hold  Charges: Therex: 10 minutes  Theract: 20 minutes  NMR: 10 minutes  Manual Therapy: 10 minutes  Total Timed Treatment: 40 min  Total Treatment Time: 40 min

## 2024-10-29 ENCOUNTER — APPOINTMENT (OUTPATIENT)
Dept: PHYSICAL THERAPY | Age: 44
End: 2024-10-29
Attending: PHYSICAL MEDICINE & REHABILITATION
Payer: COMMERCIAL

## 2024-10-29 ENCOUNTER — TELEPHONE (OUTPATIENT)
Dept: PHYSICAL THERAPY | Age: 44
End: 2024-10-29

## 2024-10-29 DIAGNOSIS — M54.42 ACUTE LEFT-SIDED LOW BACK PAIN WITH LEFT-SIDED SCIATICA: Primary | ICD-10-CM

## 2024-10-29 DIAGNOSIS — M47.816 LUMBAR FACET ARTHROPATHY: ICD-10-CM

## 2024-10-31 ENCOUNTER — APPOINTMENT (OUTPATIENT)
Dept: PHYSICAL THERAPY | Age: 44
End: 2024-10-31
Attending: PHYSICAL MEDICINE & REHABILITATION
Payer: COMMERCIAL

## 2024-11-02 ENCOUNTER — OFFICE VISIT (OUTPATIENT)
Dept: DERMATOLOGY CLINIC | Facility: CLINIC | Age: 44
End: 2024-11-02

## 2024-11-02 DIAGNOSIS — L91.0 KELOID SCAR: ICD-10-CM

## 2024-11-02 DIAGNOSIS — D23.9 DERMATOFIBROMA: ICD-10-CM

## 2024-11-02 DIAGNOSIS — D23.9 BENIGN NEOPLASM OF SKIN, UNSPECIFIED LOCATION: ICD-10-CM

## 2024-11-02 DIAGNOSIS — D48.5 NEOPLASM OF UNCERTAIN BEHAVIOR OF SKIN: Primary | ICD-10-CM

## 2024-11-02 PROCEDURE — 88305 TISSUE EXAM BY PATHOLOGIST: CPT | Performed by: DERMATOLOGY

## 2024-11-08 NOTE — PROGRESS NOTES
The pathology report from last visit showed    right ala, shave biopsy:  -Fibrous papule  Please log in test results, send biopsy results letter.  Pt to rtc 1 year or prn.

## 2024-11-17 DIAGNOSIS — I10 ESSENTIAL HYPERTENSION: ICD-10-CM

## 2024-11-17 NOTE — PROGRESS NOTES
Operative Report                     Shave/  Tangential biopsy     Clinical diagnosis:    Size of lesion:  Patient with lesion recurrent post cryo, growing tender 3mm verrucous papule r/o wart vs sk vs otherat r ala,   Location:    Procedure:    With patient in appropriate position the skin of the above was scrubbed with alcohol.  Anesthesia was obtained with 1% Xylocaine with epinephrine.  The skin surrounding the lesion was placed under tension and the lesion was incised using a #15 scalpel blade.  The specimen was sent for histopathologic exam.    Hemostasis was obtained with electrocautery/aluminum chloride.  Estimated blood loss less than 2 cc.    Biopsy dressed with Polysporin, bandage.    Pressure dressing:   No    Complications: None    Written instructions given and reviewed with patient    Await pathology    Contact information reviewed.    Procedural physician:  Namita Llanes MD
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
## 2024-11-21 RX ORDER — AMLODIPINE BESYLATE 5 MG/1
5 TABLET ORAL EVERY MORNING
Qty: 90 TABLET | Refills: 3 | Status: SHIPPED | OUTPATIENT
Start: 2024-11-21

## 2024-11-21 RX ORDER — SPIRONOLACTONE 25 MG/1
25 TABLET ORAL EVERY MORNING
Qty: 90 TABLET | Refills: 3 | Status: SHIPPED | OUTPATIENT
Start: 2024-11-21

## 2024-11-21 NOTE — TELEPHONE ENCOUNTER
Refill passed per [facility] protocol.  Requested Prescriptions   Pending Prescriptions Disp Refills    AMLODIPINE 5 MG Oral Tab [Pharmacy Med Name: AMLODIPINE BESYLATE 5MG TABLETS] 90 tablet 3     Sig: TAKE 1 TABLET(5 MG) BY MOUTH EVERY MORNING       Hypertension Medications Protocol Passed - 11/21/2024 11:17 AM        Passed - CMP or BMP in past 12 months        Passed - Last BP reading less than 140/90     BP Readings from Last 1 Encounters:   10/24/24 139/88               Passed - In person appointment or virtual visit in the past 12 mos or appointment in next 3 mos     Recent Outpatient Visits              2 weeks ago Neoplasm of uncertain behavior of skin    [facility] Namita Llanes MD    Office Visit    4 weeks ago Midline low back pain with sciatica, sciatica laterality unspecified, unspecified chronicity    [facility] Dev Moreira MD    Office Visit    4 weeks ago Acute left-sided low back pain with left-sided sciatica    [facility] in [place] Karpuzi, Mera, PT    Office Visit    4 weeks ago Screen for colon cancer    [facility] Sonny Steward MD    Office Visit    1 month ago Acute left-sided low back pain with left-sided sciatica    [facility] in [place] Karpuzi, Mera, PT    Office Visit          Future Appointments         Provider Department Appt Notes    In 6 days [address] [facility]     In 2 weeks Dev Moreira MD [facility] 6 week    In 3 weeks Dev Moreira MD [facility]     In 1 month Namita Llanes MD [facility]     In 2 months Sonny Steward MD [facility]                      Passed - EGFRCR or GFRAA > 50     GFR Evaluation  EGFRCR: 76 , resulted on 6/29/2024            SPIRONOLACTONE 25 MG Oral Tab [Pharmacy Med Name: SPIRONOLACTONE 25MG TABLETS] 90 tablet 1     Sig: TAKE 1 TABLET(25 MG) BY MOUTH EVERY MORNING       Hypertension Medications Protocol Passed - 11/21/2024 11:17 AM        Passed - CMP or BMP in past 12 months        Passed - Last BP reading less than 140/90     BP Readings from Last 1 Encounters:   10/24/24 139/88               Passed - In person appointment or virtual visit in the past 12 mos or appointment in next 3 mos     Recent Outpatient Visits              2 weeks ago Neoplasm of uncertain behavior of skin    [facility] Namita Llanes MD    Office Visit    4 weeks ago Midline low back pain with sciatica, sciatica laterality unspecified, unspecified chronicity    [facility] Dev Moreira MD    Office Visit    4 weeks ago Acute left-sided low back pain with left-sided sciatica    [facility] in [place] Karpuzi, Mera, PT    Office Visit    4 weeks ago Screen for colon cancer    [facility] Sonny Steward MD    Office Visit    1 month ago Acute left-sided low back pain with left-sided sciatica    [facility] in [place] Karpuzi, Mera, PT    Office Visit          Future Appointments         Provider Department Appt Notes    In 6 days [facility]     In 2 weeks Dev Moreira MD [facility] 6 week    In 3 weeks Dev Moreira MD [facility]     In 1 month Namita Llanes MD [facility]     In 2 months Sonny Steward MD [facility]                     Passed - EGFRCR  or GFRAA > 50     GFR Evaluation  EGFRCR: 76 , resulted on 6/29/2024             Future Appointments         Provider Department Appt Notes    In 6 days [address] [facility]     In 2 weeks Dev Moreira MD [facility] 6 week    In 3 weeks Dev Moreira MD [facility]     In 1 month Namita Llanes MD [facility]     In 2 months Sonny Steward MD [facility]           Recent Outpatient Visits              2 weeks ago Neoplasm of uncertain behavior of skin    [facility] Namita Llanes MD    Office Visit    4 weeks ago Midline low back pain with sciatica, sciatica laterality unspecified, unspecified chronicity    [facility] Dev Moreira MD    Office Visit    4 weeks ago Acute left-sided low back pain with left-sided sciatica    [facility] in [place] KarpuziJane, PT    Office Visit    4 weeks ago Screen for colon cancer    [facility] Sonny Steward MD    Office Visit    1 month ago Acute left-sided low back pain with left-sided sciatica    [facility] in [place] Karpuzi Jane, PT    Office Visit

## 2024-11-27 ENCOUNTER — HOSPITAL ENCOUNTER (OUTPATIENT)
Dept: ULTRASOUND IMAGING | Facility: HOSPITAL | Age: 44
Discharge: HOME | End: 2024-11-27
Attending: INTERNAL MEDICINE
Payer: COMMERCIAL

## 2024-11-27 DIAGNOSIS — E04.1 THYROID NODULE: ICD-10-CM

## 2024-11-27 PROCEDURE — 76536 US EXAM OF HEAD AND NECK: CPT | Performed by: INTERNAL MEDICINE

## 2024-12-03 ENCOUNTER — TELEPHONE (OUTPATIENT)
Dept: INTERNAL MEDICINE CLINIC | Facility: CLINIC | Age: 44
End: 2024-12-03

## 2024-12-03 NOTE — TELEPHONE ENCOUNTER
Patient is asking if she can schedule her depo shot or if she needs to speak to doctor or nurse first

## 2024-12-03 NOTE — TELEPHONE ENCOUNTER
Called patient in regards to message below ( identified name and date of birth )     Informed patient she needs to have a nurse visit for the Depo iinjection     Appointment made   Future Appointments   Date Time Provider Department Center   12/9/2024 10:20 AM [facility]   12/16/2024 10:00 AM [facility]   12/24/2024  8:30 AM EC [facility] FLR RN IM [id number] [place]   1/13/2025  6:15 PM [facility]   1/28/2025  7:30 AM [facility]   2/10/2025  5:30 PM [facility]

## 2024-12-09 ENCOUNTER — OFFICE VISIT (OUTPATIENT)
Dept: INTERNAL MEDICINE CLINIC | Facility: CLINIC | Age: 44
End: 2024-12-09

## 2024-12-09 VITALS
WEIGHT: 241 LBS | HEART RATE: 86 BPM | DIASTOLIC BLOOD PRESSURE: 83 MMHG | BODY MASS INDEX: 41.15 KG/M2 | HEIGHT: 64 IN | SYSTOLIC BLOOD PRESSURE: 135 MMHG

## 2024-12-09 DIAGNOSIS — B02.8 HERPES ZOSTER WITH COMPLICATION: ICD-10-CM

## 2024-12-09 DIAGNOSIS — I10 PRIMARY HYPERTENSION: Primary | ICD-10-CM

## 2024-12-09 PROCEDURE — 99214 OFFICE O/P EST MOD 30 MIN: CPT | Performed by: INTERNAL MEDICINE

## 2024-12-09 RX ORDER — HYDROCORTISONE 25 MG/G
CREAM TOPICAL
Qty: 28 G | Refills: 0 | Status: SHIPPED | OUTPATIENT
Start: 2024-12-09 | End: 2024-12-09

## 2024-12-09 NOTE — PROGRESS NOTES
HPI:    Patient ID: Oscar Hair is a 44 year old female.  Patient presents with    Chief Complaint   Patient presents with    Hypertension   HTN bp check   Patient states she is taking her medications regularly amlodipine 5 mg and spironolactone 25 mg every day   Bp at  home 120-130/80s   bp  checked today stable   Patient states otherwise she is doing well denies any chest pain shortness of breath chest pain or palpitation  Patient states she has seen dermatologist Dr. Llanes for the rash was  told likely she had the shingles with formation of the keloid of the left thigh  Patient denies any pain at this time denies any itchiness of the left  thigh leg lesion patient also states that is improving healing getting smaller.        Hypertension  This is a chronic problem. The current episode started more than 1 year ago. The problem has been gradually improving since onset. The problem is controlled. Pertinent negatives include no palpitations, peripheral edema, PND or shortness of breath. Risk factors for coronary artery disease include obesity. Past treatments include lifestyle changes, diuretics and calcium channel blockers. The current treatment provides significant improvement.       Review of Systems   HENT:  Negative for ear pain.    Eyes:  Negative for pain and redness.   Respiratory:  Negative for shortness of breath and wheezing.    Cardiovascular:  Negative for palpitations, leg swelling and PND.   Gastrointestinal:  Negative for blood in stool, constipation and diarrhea.             Genitourinary:  Negative for frequency.   Musculoskeletal:  Negative for arthralgias and back pain.   Skin:  Negative for pallor.        Helping leg rash .     Neurological:  Negative for dizziness.   Psychiatric/Behavioral:  Negative for confusion. The patient is not nervous/anxious.             Current Outpatient Medications   Medication Sig Dispense Refill    amLODIPine 5 MG Oral Tab Take 1 tablet (5 mg total) by mouth  every morning. 90 tablet 3    spironolactone 25 MG Oral Tab Take 1 tablet (25 mg total) by mouth every morning. 90 tablet 3    FIBER ADULT GUMMIES OR Take by mouth daily.      hydrocortisone 2.5 % External Cream Apply to affected area twice daily 28 g 0    Omeprazole 40 MG Oral Capsule Delayed Release Take 1 capsule (40 mg total) by mouth daily. 90 capsule 3    PREVIDENT 5000 SENSITIVE 1.1-5 % Dental Paste USE TWICE DAILY IN PLACE OF REGULAR TOOTHPASTE.      APPLE CIDER VINEGAR OR Take by mouth.      Ascorbic Acid (VITAMIN C ADULT GUMMIES OR) Take 2 tablets by mouth daily.      Vitamin B-12 1000 MCG Oral Tab Take 1 tablet by mouth daily      DiphenhydrAMINE HCl (BENADRYL ALLERGY OR) Take by mouth daily as needed.      Multiple Vitamins-Minerals (MULTIVITAMIN GUMMIES WOMENS OR) Take by mouth daily.      loratadine 10 MG Oral Tab Take 1 tablet (10 mg total) by mouth daily.      Scopolamine 1.5mg TD patch 1mg/3days Place 1 patch onto the skin every third day. (Patient not taking: Reported on 10/24/2024) 9 patch 1     Allergies:  Allergies   Allergen Reactions    Azithromycin NAUSEA AND VOMITING    Ciprofloxacin NAUSEA AND VOMITING    Sulfasalazine PAIN      PHYSICAL EXAM:   Physical Exam  Vitals and nursing note reviewed.   Constitutional:       General: She is not in acute distress.     Appearance: She is well-developed. She is obese.   HENT:      Head: Normocephalic and atraumatic.   Eyes:      General: No scleral icterus.        Right eye: No discharge.         Left eye: No discharge.   Neck:      Thyroid: No thyromegaly.      Vascular: No JVD.   Cardiovascular:      Rate and Rhythm: Normal rate and regular rhythm.      Heart sounds: Normal heart sounds. No murmur heard.  Pulmonary:      Effort: Pulmonary effort is normal. No respiratory distress.      Breath sounds: Normal breath sounds. No wheezing or rales.   Abdominal:      Palpations: Abdomen is soft. There is no mass.      Tenderness: There is no abdominal  tenderness.   Musculoskeletal:      Cervical back: Neck supple.      Right knee: No bony tenderness. Normal range of motion.      Left knee: No bony tenderness or crepitus. Normal range of motion.   Lymphadenopathy:      Cervical: No cervical adenopathy.   Skin:     General: Skin is warm and dry.             Comments:     Small skin healed lesion lower back      Left  thigh round  hard -around  10   cm  harder erythemas lesion   with  smaller lumps -since  8/24    Hx shingles -keloid formation   No Tender  no pruritus   Seen  dermatologist Dr Llanes       Neurological:      Mental Status: She is alert and oriented to person, place, and time.   Psychiatric:         Behavior: Behavior normal.       Blood pressure 135/83, pulse 86, height 5' 4\" (1.626 m), weight 241 lb (109.3 kg), last menstrual period 09/27/2024, not currently breastfeeding.             ASSESSMENT/PLAN:         History of shingles  Left  thigh   Keloid   Seeing Derm   Stable cpm     Essential hypertension  Take high blood pressure medication as perscribed   Low- sodium diet   Maintain walking - as tolerated   Track and record blood pressure and heart rate at home   The side effects of medication discussed with patient   Patient verbalizes understanding and compliance  spironolactone 25 mg qd    amlodipine 5 mg daily  take it  regularly   Stable , continue present management  Labs   Continue present management       Multiple Thyroid nodules  - US THYROID   discussed   F/u 1 year   - Endocrine Referral              Rosai-Dami disease (HCC)      Right mid abdominal   Lesion/ cyst -no pain or mass   No symptoms   Had  f/u  visit -  Dr Toth -  Had CT  imaging - discussed with pt    CONCLUSION:  1. No cervical, mediastinal, axillary, intra-abdominal, or intrapelvic lymphadenopathy is apparent.     2. No acute intrathoracic process is identified.     3. No acute intra-abdominal process is identified by noncontrast CT technique. The etiology of the  patient's symptoms is unclear from this study.     4. Right thyroid lobe nodule measuring up to 2.4 cm. If not previously worked up, follow-up thyroid sonography is suggested.       5. Low-density appearance of the intracardiac blood pool raises the possibility of underlying anemia. Correlate with hematologic parameters.     6. Lesser incidental findings as above.           elm-remote.              GERD  Patient advised to avoid spicy food, coffee, tea, caffeinated drinks, alcohol, acidic food/juices  Advised to avoid NSAID's - Aspirin-based medications  Advised to avoid wearing  tight clothes   Advised to elevate the head of the bed   Avoid eating at least 3 hours before bedtime   Counseling on ideal weight/BMI  Take Omeprazole 40 mg - PPIs qd in the morning 30-60 minutes before breakfast always on empty stomach if any  heartburns  can take bid 1 tab 30 min - before diner    side effects and directions of medication discussed with patient. Patient verbalized understanding and compliance.           No orders of the defined types were placed in this encounter.      Meds This Visit:  Requested Prescriptions      No prescriptions requested or ordered in this encounter       Imaging & Referrals:  None       ID#[id number]

## 2024-12-24 ENCOUNTER — NURSE ONLY (OUTPATIENT)
Dept: INTERNAL MEDICINE CLINIC | Facility: CLINIC | Age: 44
End: 2024-12-24

## 2024-12-24 DIAGNOSIS — Z30.42 ENCOUNTER FOR DEPO-PROVERA CONTRACEPTION: Primary | ICD-10-CM

## 2024-12-24 PROCEDURE — 96372 THER/PROPH/DIAG INJ SC/IM: CPT | Performed by: INTERNAL MEDICINE

## 2024-12-24 PROCEDURE — 81025 URINE PREGNANCY TEST: CPT | Performed by: INTERNAL MEDICINE

## 2024-12-24 RX ADMIN — MEDROXYPROGESTERONE ACETATE 150 MG: 150 INJECTION, SUSPENSION INTRAMUSCULAR at 08:50:00

## 2024-12-24 NOTE — PROGRESS NOTES
Patient arrived for her depo provera injection.  Last depo provera injection was on 10/1/24.  Ordered UHCG test done in office that was negative.  Injection given into pts left deltoid with no reactions.  Informed pt that next window for her next depo provera injection would be March 11th - March 25th.  Pt verbalized understanding.

## 2025-01-13 ENCOUNTER — OFFICE VISIT (OUTPATIENT)
Dept: DERMATOLOGY CLINIC | Facility: CLINIC | Age: 45
End: 2025-01-13

## 2025-01-13 DIAGNOSIS — L81.9 DYSCHROMIA: ICD-10-CM

## 2025-01-13 DIAGNOSIS — D23.9 BENIGN NEOPLASM OF SKIN, UNSPECIFIED LOCATION: Primary | ICD-10-CM

## 2025-01-13 DIAGNOSIS — Z86.19 HISTORY OF HERPES ZOSTER: ICD-10-CM

## 2025-01-13 DIAGNOSIS — L91.0 KELOID SCAR: ICD-10-CM

## 2025-01-13 PROCEDURE — 99213 OFFICE O/P EST LOW 20 MIN: CPT | Performed by: DERMATOLOGY

## 2025-01-13 RX ORDER — HYDROCORTISONE 25 MG/G
1 CREAM TOPICAL 2 TIMES DAILY
COMMUNITY
Start: 2024-12-09

## 2025-01-13 NOTE — PROGRESS NOTES
The following individual(s) verbally consented to be recorded using ambient AI listening technology and understand that they can each withdraw their consent to this listening technology at any point by asking the clinician to turn off or pause the recording: Oscar Hair
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
## 2025-01-28 ENCOUNTER — OFFICE VISIT (OUTPATIENT)
Facility: CLINIC | Age: 45
End: 2025-01-28
Payer: COMMERCIAL

## 2025-01-28 ENCOUNTER — TELEPHONE (OUTPATIENT)
Facility: CLINIC | Age: 45
End: 2025-01-28

## 2025-01-28 VITALS
DIASTOLIC BLOOD PRESSURE: 82 MMHG | HEIGHT: 64 IN | WEIGHT: 240 LBS | BODY MASS INDEX: 40.97 KG/M2 | SYSTOLIC BLOOD PRESSURE: 132 MMHG | HEART RATE: 99 BPM

## 2025-01-28 DIAGNOSIS — Z12.11 COLON CANCER SCREENING: Primary | ICD-10-CM

## 2025-01-28 PROCEDURE — S0285 CNSLT BEFORE SCREEN COLONOSC: HCPCS | Performed by: STUDENT IN AN ORGANIZED HEALTH CARE EDUCATION/TRAINING PROGRAM
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
## 2025-01-28 NOTE — TELEPHONE ENCOUNTER
Signed Cologuard order requisition form, facesheet and copy of the insurance card faxed to [employer].    Phone# [phone]  Fax# [phone]    I received a successful fax confirmation. Forms are now being send for scanning.

## 2025-02-04 ENCOUNTER — TELEPHONE (OUTPATIENT)
Dept: INTERNAL MEDICINE CLINIC | Facility: CLINIC | Age: 45
End: 2025-02-04

## 2025-02-04 ENCOUNTER — OFFICE VISIT (OUTPATIENT)
Dept: INTERNAL MEDICINE CLINIC | Facility: CLINIC | Age: 45
End: 2025-02-04

## 2025-02-04 VITALS
BODY MASS INDEX: 40.97 KG/M2 | WEIGHT: 240 LBS | SYSTOLIC BLOOD PRESSURE: 134 MMHG | HEIGHT: 64 IN | OXYGEN SATURATION: 98 % | DIASTOLIC BLOOD PRESSURE: 85 MMHG | HEART RATE: 93 BPM

## 2025-02-04 DIAGNOSIS — Z30.42 DEPOT CONTRACEPTION: ICD-10-CM

## 2025-02-04 DIAGNOSIS — Z12.31 ENCOUNTER FOR SCREENING MAMMOGRAM FOR MALIGNANT NEOPLASM OF BREAST: ICD-10-CM

## 2025-02-04 DIAGNOSIS — R79.0 LOW FERRITIN: ICD-10-CM

## 2025-02-04 DIAGNOSIS — Z13.0 SCREENING FOR DEFICIENCY ANEMIA: ICD-10-CM

## 2025-02-04 DIAGNOSIS — Z13.220 LIPID SCREENING: ICD-10-CM

## 2025-02-04 DIAGNOSIS — Z00.00 WELLNESS EXAMINATION: Primary | ICD-10-CM

## 2025-02-04 DIAGNOSIS — Z13.29 THYROID DISORDER SCREEN: ICD-10-CM

## 2025-02-04 PROCEDURE — 99396 PREV VISIT EST AGE 40-64: CPT | Performed by: NURSE PRACTITIONER

## 2025-02-04 NOTE — TELEPHONE ENCOUNTER
Good morning Dr Steward,     I saw Oscar Hair this morning for a physical and after reviewing her history and your last visit with her, she has low ferritin, normal hemoglobin. However she has been on depo provera injections and has had only 2 episodes (that she reports) of menses in the past 15 years. She has received cologuard for testing but wanted to check with you if this would change her screening plan (cologuard vs EGD/colonoscopy) for the low joan. Thank you for your time.

## 2025-02-04 NOTE — TELEPHONE ENCOUNTER
Garrison abraham,    I was told the menses was more than that.    I also talked to her regarding EGD/Colonoscopy for evaluation -- she told me she is not interested in pursuing endoscopic evaluation at this time.    Lets set up a time to further discuss over the phone, will send you a message.

## 2025-02-04 NOTE — ASSESSMENT & PLAN NOTE
-Advised pt to schedule appt with gynecology. Reports 30 years of depo use. 2 menses reported in the past 15 years.   Orders:    [facility] Referral - In Network
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
## 2025-02-06 NOTE — TELEPHONE ENCOUNTER
I tried calling you back twice. You can reach me on my cell. I did let the patient know to schedule/call your office.

## 2025-02-10 ENCOUNTER — OFFICE VISIT (OUTPATIENT)
Dept: ENDOCRINOLOGY CLINIC | Facility: CLINIC | Age: 45
End: 2025-02-10

## 2025-02-10 VITALS
HEIGHT: 63.5 IN | WEIGHT: 243 LBS | DIASTOLIC BLOOD PRESSURE: 80 MMHG | SYSTOLIC BLOOD PRESSURE: 120 MMHG | HEART RATE: 98 BPM | BODY MASS INDEX: 42.52 KG/M2

## 2025-02-10 DIAGNOSIS — E66.01 MORBID OBESITY WITH BMI OF 40.0-44.9, ADULT (HCC): ICD-10-CM

## 2025-02-10 DIAGNOSIS — D76.3 ROSAI-DORFMAN DISEASE (HCC): ICD-10-CM

## 2025-02-10 DIAGNOSIS — E04.2 MULTIPLE THYROID NODULES: Primary | ICD-10-CM

## 2025-02-10 DIAGNOSIS — I10 ESSENTIAL HYPERTENSION: ICD-10-CM

## 2025-02-10 DIAGNOSIS — Z86.19 HISTORY OF SHINGLES: ICD-10-CM

## 2025-02-10 DIAGNOSIS — E07.9 THYROID DISEASE: ICD-10-CM

## 2025-02-10 PROCEDURE — 99205 OFFICE O/P NEW HI 60 MIN: CPT | Performed by: INTERNAL MEDICINE
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
## 2025-02-20 ENCOUNTER — LAB ENCOUNTER (OUTPATIENT)
Dept: LAB | Age: 45
End: 2025-02-20
Attending: NURSE PRACTITIONER
Payer: COMMERCIAL

## 2025-02-20 DIAGNOSIS — D64.9 ANEMIA, UNSPECIFIED TYPE: Primary | ICD-10-CM

## 2025-02-20 LAB
ALBUMIN SERPL-MCNC: 4.4 G/DL (ref 3.2–4.8)
ALBUMIN/GLOB SERPL: 1.6 {RATIO} (ref 1–2)
ALP LIVER SERPL-CCNC: 65 U/L
ALT SERPL-CCNC: 16 U/L
ANION GAP SERPL CALC-SCNC: 8 MMOL/L (ref 0–18)
AST SERPL-CCNC: 16 U/L (ref ?–34)
BASOPHILS # BLD AUTO: 0.04 X10(3) UL (ref 0–0.2)
BASOPHILS NFR BLD AUTO: 0.8 %
BILIRUB SERPL-MCNC: 0.5 MG/DL (ref 0.3–1.2)
BUN BLD-MCNC: 11 MG/DL (ref 9–23)
BUN/CREAT SERPL: 11.3 (ref 10–20)
CALCIUM BLD-MCNC: 9.8 MG/DL (ref 8.7–10.4)
CHLORIDE SERPL-SCNC: 105 MMOL/L (ref 98–112)
CHOLEST SERPL-MCNC: 225 MG/DL (ref ?–200)
CO2 SERPL-SCNC: 27 MMOL/L (ref 21–32)
CREAT BLD-MCNC: 0.97 MG/DL
DEPRECATED RDW RBC AUTO: 37 FL (ref 35.1–46.3)
EGFRCR SERPLBLD CKD-EPI 2021: 73 ML/MIN/1.73M2 (ref 60–?)
EOSINOPHIL # BLD AUTO: 0.05 X10(3) UL (ref 0–0.7)
EOSINOPHIL NFR BLD AUTO: 1 %
ERYTHROCYTE [DISTWIDTH] IN BLOOD BY AUTOMATED COUNT: 13.7 % (ref 11–15)
FASTING PATIENT LIPID ANSWER: YES
FASTING STATUS PATIENT QL REPORTED: YES
GLOBULIN PLAS-MCNC: 2.8 G/DL (ref 2–3.5)
GLUCOSE BLD-MCNC: 92 MG/DL (ref 70–99)
HCT VFR BLD AUTO: 37.2 %
HDLC SERPL-MCNC: 49 MG/DL (ref 40–59)
HGB BLD-MCNC: 11.6 G/DL
IMM GRANULOCYTES # BLD AUTO: 0.01 X10(3) UL (ref 0–1)
IMM GRANULOCYTES NFR BLD: 0.2 %
LDLC SERPL CALC-MCNC: 164 MG/DL (ref ?–100)
LYMPHOCYTES # BLD AUTO: 1.75 X10(3) UL (ref 1–4)
LYMPHOCYTES NFR BLD AUTO: 34.8 %
MCH RBC QN AUTO: 23.5 PG (ref 26–34)
MCHC RBC AUTO-ENTMCNC: 31.2 G/DL (ref 31–37)
MCV RBC AUTO: 75.3 FL
MONOCYTES # BLD AUTO: 0.56 X10(3) UL (ref 0.1–1)
MONOCYTES NFR BLD AUTO: 11.1 %
NEUTROPHILS # BLD AUTO: 2.62 X10 (3) UL (ref 1.5–7.7)
NEUTROPHILS # BLD AUTO: 2.62 X10(3) UL (ref 1.5–7.7)
NEUTROPHILS NFR BLD AUTO: 52.1 %
NONHDLC SERPL-MCNC: 176 MG/DL (ref ?–130)
OSMOLALITY SERPL CALC.SUM OF ELEC: 289 MOSM/KG (ref 275–295)
PLATELET # BLD AUTO: 269 10(3)UL (ref 150–450)
PLATELETS.RETICULATED NFR BLD AUTO: 13.2 % (ref 0–7)
POTASSIUM SERPL-SCNC: 3.9 MMOL/L (ref 3.5–5.1)
PROT SERPL-MCNC: 7.2 G/DL (ref 5.7–8.2)
RBC # BLD AUTO: 4.94 X10(6)UL
SODIUM SERPL-SCNC: 140 MMOL/L (ref 136–145)
TRIGL SERPL-MCNC: 71 MG/DL (ref 30–149)
TSI SER-ACNC: 2.88 UIU/ML (ref 0.55–4.78)
VLDLC SERPL CALC-MCNC: 14 MG/DL (ref 0–30)
WBC # BLD AUTO: 5 X10(3) UL (ref 4–11)

## 2025-02-20 PROCEDURE — 84443 ASSAY THYROID STIM HORMONE: CPT | Performed by: NURSE PRACTITIONER

## 2025-02-20 PROCEDURE — 80061 LIPID PANEL: CPT | Performed by: NURSE PRACTITIONER

## 2025-02-20 PROCEDURE — 80053 COMPREHEN METABOLIC PANEL: CPT | Performed by: NURSE PRACTITIONER

## 2025-02-20 PROCEDURE — 85025 COMPLETE CBC W/AUTO DIFF WBC: CPT | Performed by: NURSE PRACTITIONER

## 2025-02-20 PROCEDURE — 36415 COLL VENOUS BLD VENIPUNCTURE: CPT | Performed by: NURSE PRACTITIONER

## 2025-02-24 DIAGNOSIS — D64.9 ANEMIA, UNSPECIFIED TYPE: Primary | ICD-10-CM

## 2025-03-18 ENCOUNTER — NURSE ONLY (OUTPATIENT)
Dept: INTERNAL MEDICINE CLINIC | Facility: CLINIC | Age: 45
End: 2025-03-18

## 2025-03-18 DIAGNOSIS — Z30.42 ENCOUNTER FOR DEPO-PROVERA CONTRACEPTION: Primary | ICD-10-CM

## 2025-03-18 PROCEDURE — 81025 URINE PREGNANCY TEST: CPT | Performed by: INTERNAL MEDICINE

## 2025-03-18 PROCEDURE — 96372 THER/PROPH/DIAG INJ SC/IM: CPT | Performed by: INTERNAL MEDICINE

## 2025-03-18 RX ADMIN — MEDROXYPROGESTERONE ACETATE 150 MG: 150 INJECTION, SUSPENSION INTRAMUSCULAR at 09:40:00

## 2025-03-18 NOTE — PROGRESS NOTES
Patient arrived for a nurse visit to obtain her depo provera injection.  Last injection was given on 12/24/24.  Patient is within her window today.  In office urine pregnancy was negative.  Depo provera injection was given into the left deltoid with no reactions.  Next depo provera window is June 3rd-June 17th.  Dates given to patient at the time of visit.

## 2025-05-13 ENCOUNTER — TELEPHONE (OUTPATIENT)
Dept: INTERNAL MEDICINE CLINIC | Facility: CLINIC | Age: 45
End: 2025-05-13

## 2025-06-04 ENCOUNTER — OFFICE VISIT (OUTPATIENT)
Dept: OBGYN CLINIC | Facility: CLINIC | Age: 45
End: 2025-06-04

## 2025-06-04 VITALS
WEIGHT: 240 LBS | HEART RATE: 76 BPM | DIASTOLIC BLOOD PRESSURE: 87 MMHG | BODY MASS INDEX: 42 KG/M2 | SYSTOLIC BLOOD PRESSURE: 140 MMHG

## 2025-06-04 DIAGNOSIS — Z30.42 DEPO-PROVERA CONTRACEPTIVE STATUS: Primary | ICD-10-CM

## 2025-06-04 PROCEDURE — 99213 OFFICE O/P EST LOW 20 MIN: CPT | Performed by: NURSE PRACTITIONER

## 2025-06-04 NOTE — PROGRESS NOTES
The following individual(s) verbally consented to be recorded using ambient AI listening technology and understand that they can each withdraw their consent to this listening technology at any point by asking the clinician to turn off or pause the recording:    Patient name: Oscar Hair  Additional names
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
## 2025-06-09 ENCOUNTER — NURSE ONLY (OUTPATIENT)
Dept: INTERNAL MEDICINE CLINIC | Facility: CLINIC | Age: 45
End: 2025-06-09

## 2025-06-09 DIAGNOSIS — Z30.42 ENCOUNTER FOR DEPO-PROVERA CONTRACEPTION: Primary | ICD-10-CM

## 2025-06-09 RX ADMIN — MEDROXYPROGESTERONE ACETATE 150 MG: 150 INJECTION, SUSPENSION INTRAMUSCULAR at 09:22:00

## 2025-06-09 NOTE — PROGRESS NOTES
Pt presents for Depo provera . Name and  verified . Order under MAR . Pt is on time for her depo . Negative pregnancy test . Next depo is due 25 - 25 , calendar provided to Pt . Pt tolerated injection well .

## 2025-07-02 ENCOUNTER — OFFICE VISIT (OUTPATIENT)
Dept: OBGYN CLINIC | Facility: CLINIC | Age: 45
End: 2025-07-02

## 2025-07-02 VITALS
HEART RATE: 91 BPM | SYSTOLIC BLOOD PRESSURE: 142 MMHG | DIASTOLIC BLOOD PRESSURE: 82 MMHG | BODY MASS INDEX: 41 KG/M2 | WEIGHT: 235.81 LBS

## 2025-07-02 DIAGNOSIS — Z01.419 WELL WOMAN EXAM WITH ROUTINE GYNECOLOGICAL EXAM: Primary | ICD-10-CM

## 2025-07-02 DIAGNOSIS — Z30.42 ENCOUNTER FOR SURVEILLANCE OF INJECTABLE CONTRACEPTIVE: ICD-10-CM

## 2025-07-02 DIAGNOSIS — Z11.3 ROUTINE SCREENING FOR STI (SEXUALLY TRANSMITTED INFECTION): ICD-10-CM

## 2025-07-02 DIAGNOSIS — Z12.4 SCREENING FOR CERVICAL CANCER: ICD-10-CM

## 2025-07-02 PROCEDURE — 99396 PREV VISIT EST AGE 40-64: CPT | Performed by: NURSE PRACTITIONER

## 2025-07-02 RX ORDER — MEDROXYPROGESTERONE ACETATE 150 MG/ML
150 INJECTION, SUSPENSION INTRAMUSCULAR
Status: SHIPPED | OUTPATIENT
Start: 2025-08-25 | End: 2026-08-19
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
## 2025-07-02 NOTE — PROGRESS NOTES
The following individual(s) verbally consented to be recorded using ambient AI listening technology and understand that they can each withdraw their consent to this listening technology at any point by asking the clinician to turn off or pause the recording:    Patient name: Oscar Hair  Additional names:

## 2025-07-03 LAB
C TRACH DNA SPEC QL NAA+PROBE: NEGATIVE
HPV E6+E7 MRNA CVX QL NAA+PROBE: NEGATIVE
N GONORRHOEA DNA SPEC QL NAA+PROBE: NEGATIVE

## 2025-07-05 ENCOUNTER — HOSPITAL ENCOUNTER (OUTPATIENT)
Dept: MAMMOGRAPHY | Facility: HOSPITAL | Age: 45
Discharge: HOME | End: 2025-07-05
Attending: NURSE PRACTITIONER
Payer: COMMERCIAL

## 2025-07-05 DIAGNOSIS — Z12.31 ENCOUNTER FOR SCREENING MAMMOGRAM FOR MALIGNANT NEOPLASM OF BREAST: ICD-10-CM

## 2025-07-05 PROCEDURE — 77063 BREAST TOMOSYNTHESIS BI: CPT | Performed by: RADIOLOGY

## 2025-07-07 LAB — T VAGINALIS RRNA SPEC QL NAA+PROBE: NEGATIVE

## 2025-08-05 ENCOUNTER — TELEPHONE (OUTPATIENT)
Dept: OBGYN CLINIC | Facility: CLINIC | Age: 45
End: 2025-08-05

## 2025-08-18 DIAGNOSIS — K21.9 GASTROESOPHAGEAL REFLUX DISEASE WITHOUT ESOPHAGITIS: ICD-10-CM

## 2025-08-21 RX ORDER — OMEPRAZOLE 40 MG/1
40 CAPSULE, DELAYED RELEASE ORAL DAILY
Qty: 90 CAPSULE | Refills: 3 | Status: SHIPPED | OUTPATIENT
Start: 2025-08-21

## (undated) NOTE — LETTER
02/10/21    Stan [address] [address]      Dear Ander Horton records indicate that you have outstanding lab work and or testing that was ordered for you and has not yet been completed:  Orders Placed This Encounter

## (undated) NOTE — LETTER
11/8/2024              Oscar Hair        [address] , [address]        [facility] [address]         Dear Oscar,      The report of the Biopsy done on 11/2/24 shows a Fibrous Papule.  This is a benign (not cancerous) growth, and requires no further treatment.  Please follow up with our office for an appointment in 1 year or sooner if any changes occur.  If you have any questions please contact our office.                Yours Truly,    Namita Llanes MD  [facility], [place] [address] [address] [facility] [address] [phone]

## (undated) NOTE — LETTER
Date: 4/16/2022    Patient Name: Bg Pringle          To Whom it may concern: This letter has been written at the patient's request. The above patient was seen at the [facility] for treatment of a medical condition. The patient may return to work/school today with the following limitations none. Sincerely,        PANFILO Alvarado  [facility]  04/16/22  7:50 AM

## (undated) NOTE — LETTER
05/11/21        Corwin Snow  [address] [facility] [address]      Dear Guero Poster records indicate that you have outstanding lab work and or testing that was ordered for you and has not yet been completed:  Orders Placed This Encounter

## (undated) NOTE — LETTER
8/30/2021          To Whom It May Concern:    Roland Naye is currently under my medical care and will be covid tested for an upcoming procedure on Monday 9/21 and will need to quarantine for 72 hours following the covid testing.      If you require addit

## (undated) NOTE — LETTER
Date: 12/15/2022    Patient Name: Davey Campos          To Whom it may concern: This letter has been written at the patient's request. The above patient was seen at the [facility] for treatment of a medical condition. This patient should be excused from attending work/school from 12/14/ through 12/18/2022. The patient may return to work/school on 12/19/2022.         Sincerely,        [address]

## (undated) NOTE — LETTER
AUTHORIZATION FOR SURGICAL OPERATION OR OTHER PROCEDURE    1.  I hereby authorize Dr. Yoselin Chang and the [facility] Office staff assigned to my case to perform the following operation and/or procedure at the [facility] Office:    __________Right subacromial bursa inje Patient signature:  ___________________________________________________             Relationship to Patient:             Parent    Responsible person                            Spouse  In case of minor or                     Other  _____________   Incompet

## (undated) NOTE — LETTER
11/15/21        Mohit Blanco  [address] [place], [place] [id number]      Dear Thomas Fisher records indicate that you have outstanding lab work and or testing that was ordered for you and has not yet been completed:  Orders Placed This Encounter

## (undated) NOTE — LETTER
18      Oscar Hair  :  1980      To Whom It May Concern: This patient was seen in our office on 18 .  Summary of treatments given:  1) right upper trapezius trigger point injections, 17  2) right upper trapezius trigger poin

## (undated) NOTE — LETTER
9/13/2017          To Whom It May Concern:    Ej Aviles is currently under my medical care and may not return to work at this time. Please excuse Camilia for 3 days. She may return to work on 09/18/17.        If you require additional information p

## (undated) NOTE — LETTER
18          Oscar Hair  :  1980      To Whom It May Concern: This patient was seen in our office on 18 .  She had pain with shoulder impingement maneuvers and underwent a right subacromial bursa injection under ultrasound guidance

## (undated) NOTE — LETTER
Date & Time: 9/2/2024, 3:12 AM  Patient: Oscar Hair  Encounter Provider(s):    Brant Hastings MD       To Whom It May Concern:    Oscar Hair was seen and treated in our department on 9/1/2024. She should not return to work until 09/6/24 .    If you have any questions or concerns, please do not hesitate to call.        _____________________________  Physician/APC Signature

## (undated) NOTE — MR AVS SNAPSHOT
[facility] SPECIALTY [facility] - [place] [address] [id number] [phone]               Thank you for choosing us for your health care visit with Ari Grier MD.  We are glad to serve you and happy to provide you with this summary of Make half your plate fruits and vegetables Highly refined, white starches including white bread, rice and pasta   Eat plenty of protein, keep the fat content low Sugars:  sodas and sports drinks, candies and desserts   Eat plenty of low-fat dairy products

## (undated) NOTE — LETTER
8/16/2021              Oscar Hair        [place] [place] [id number]         To Whom It May Concern,    Vincent Nash is currently under my medical care.     Sincerely,    Kaley Blackmon MD  [address] [place], [place] [id number]  3

## (undated) NOTE — LETTER
Radha Serrano Md  [address] [place] [address], [address] [facility]       12/19/17        Patient: Pattie Marcial   YOB: 1980   Date of Visit: 12/19/2017       Dear  Anaya Ellis  :           As you know, Chun Melara is a 42-year-old female who I am now eval normal. Bladder function normal. No depression. No thyroid disease. No rash. Muscles and joints unremarkable. No weight loss no weight gain.     PHYSICAL EXAMINATION: Vital signs normal. HEENT examination is unremarkable with pupils equal round and reactive

## (undated) NOTE — LETTER
10/31/19        Leory Laws  [address] [place] [address]      Dear Heather Enter records indicate that you have outstanding lab work and or testing that was ordered for you and has not yet been completed:  Orders Placed This Encou

## (undated) NOTE — LETTER
AUTHORIZATION FOR SURGICAL OPERATION OR OTHER PROCEDURE    1.  I hereby authorize Dr. Ta Cazares and the [facility] Office staff assigned to my case to perform the following operation and/or procedure at the [facility] Office:    ___Right upper trapezius trigger point Patient Name:  ________Camilia D.  Mack_________________________________  (please print)       Patient signature:  ___________________________________________________             Relationship to Patient:             Parent    Responsible person

## (undated) NOTE — LETTER
Date: 11/27/2023    Patient Name: Oneida Hutchinson          To Whom it may concern: This letter has been written at the patient's request. The above patient was seen at the [facility] for treatment of a medical condition. This patient should be excused from attending work this morning 11/27/23.         Sincerely,      Heri Arreaga PA-C

## (undated) NOTE — LETTER
WHERE IS YOUR PAIN NOW?  Jaylen the areas on your body where you feel the described sensations.  Use the appropriate symbol.  Jaylen the areas of radiation.  Include all affected areas.  Just to complete the picture, please draw in the face.     ACHE:  ^ ^ ^   NUMBNESS:  0000   PINS & NEEDLES:  = = = =                              ^ ^ ^                       0000              = = = =                                    ^ ^ ^                       0000            = = = =      BURNING:  XXXX   STABBING: ////                  XXXX                ////                         XXXX          ////     Please jaylen the line below indicating your degree of pain right now  with 0 being no pain 10 being the worst pain possible.                                         0             1             2              3             4              5              6              7             8             9             10         Patient Signature:

## (undated) NOTE — MR AVS SNAPSHOT
[facility] SPECIALTY hospitals - [place] [address] [address] [phone]               Thank you for choosing us for your health care visit with Nurse. We are glad to serve you and happy to provide you with this summary of your visit. MedroxyPROGESTERone Acetate (DEPO-PROVERA) 150 MG/ML injection 150 mg                    MyChart     Call the [employer] for assistance with your inactive [employer] account    If you have questions, you can call [phone] to talk to our David Carrera

## (undated) NOTE — LETTER
02/19/19        Arneta Butter  [address] [facility] [address]      Dear Carine Anaya records indicate that you have outstanding lab work and or testing that was ordered for you and has not yet been completed:  Orders Placed This Encou

## (undated) NOTE — LETTER
August 2, 2017     [address] [place] [address]. [address]      Dear Mic Burnette:    Below are the results from your recent visit:    Labs normal except  Anemia mild due to [facility] iron   advice pt  To take  ferros sulfate  325 mg twice daily  140 - 400 K/UL    MPV 11.2 (H) 7.4 - 10.3 fL    Neutrophil % 61 %    Lymphocyte % 29 %    Monocyte % 8 %    Eosinophil % 2 %    Basophil % 1 %    Neutrophil Absolute 3.2 1.8 - 7.7 K/UL    Lymphocyte Absolute 1.5 1.0 - 4.0 K/UL    Monocyte Absolute

## (undated) NOTE — LETTER
AUTHORIZATION FOR SURGICAL OPERATION OR OTHER PROCEDURE    1.  I hereby authorize Dr. Patrick Mckinnon and the [facility] Office staff assigned to my case to perform the following operation and/or procedure at the [facility] Office:    _____Right upper trapezius trigger poi Patient Name:  _______Camilia D.  Mack__________________________________  (please print)       Patient signature:  ___________________________________________________             Relationship to Patient:             Parent    Responsible person

## (undated) NOTE — LETTER
01/19/22        Ailyn [address] [address]      Dear Maco Goss records indicate that you have outstanding lab work and or testing that was ordered for you and has not yet been completed:  Orders Placed This Encounter

## (undated) NOTE — LETTER
18          Oscar Hair  :  1980      To Whom It May Concern: This patient was seen in our office on 18 . She continues to have right upper shoulder and right sided neck pain. Underwent osteopathic manipulative treatment today.  Wi

## (undated) NOTE — LETTER
4/18/2022              Oscar Hair        [id number]         To whom it may concern,    Yobani Suarez is under my care and may return to work with no restrictions.       Sincerely,    DONG Boyer [facility] [address]  [phone]        Document electronically generated by:  DONG Boyer

## (undated) NOTE — LETTER
18          Oscar Hair  :  1980      To Whom It May Concern: This patient was seen in our office on 18 .  She continues to have right upper shoulder and right sided neck pain despite undergoing osteopathic manipulative treatment o